# Patient Record
Sex: FEMALE | Race: WHITE | Employment: UNEMPLOYED | URBAN - METROPOLITAN AREA
[De-identification: names, ages, dates, MRNs, and addresses within clinical notes are randomized per-mention and may not be internally consistent; named-entity substitution may affect disease eponyms.]

---

## 2017-01-13 ENCOUNTER — APPOINTMENT (OUTPATIENT)
Dept: GENERAL RADIOLOGY | Age: 42
End: 2017-01-13
Attending: INTERNAL MEDICINE
Payer: MEDICAID

## 2017-01-13 ENCOUNTER — APPOINTMENT (OUTPATIENT)
Dept: CT IMAGING | Age: 42
End: 2017-01-13
Attending: INTERNAL MEDICINE
Payer: MEDICAID

## 2017-01-13 ENCOUNTER — HOSPITAL ENCOUNTER (EMERGENCY)
Age: 42
Discharge: HOME | End: 2017-01-13
Attending: INTERNAL MEDICINE
Payer: MEDICAID

## 2017-01-13 VITALS
DIASTOLIC BLOOD PRESSURE: 63 MMHG | HEIGHT: 64 IN | BODY MASS INDEX: 35 KG/M2 | WEIGHT: 205 LBS | RESPIRATION RATE: 16 BRPM | SYSTOLIC BLOOD PRESSURE: 108 MMHG | OXYGEN SATURATION: 100 % | TEMPERATURE: 97.7 F | HEART RATE: 73 BPM

## 2017-01-13 DIAGNOSIS — E83.42 HYPOMAGNESEMIA: ICD-10-CM

## 2017-01-13 DIAGNOSIS — E86.0 DEHYDRATION: ICD-10-CM

## 2017-01-13 DIAGNOSIS — R55 SYNCOPE AND COLLAPSE: Primary | ICD-10-CM

## 2017-01-13 DIAGNOSIS — E87.6 HYPOKALEMIA: ICD-10-CM

## 2017-01-13 LAB
ALBUMIN SERPL BCP-MCNC: 3.6 G/DL (ref 3.4–5)
ALBUMIN/GLOB SERPL: 1.2 {RATIO} (ref 0.8–1.7)
ALP SERPL-CCNC: 75 U/L (ref 45–117)
ALT SERPL-CCNC: 28 U/L (ref 13–56)
AMPHET UR QL SCN: NEGATIVE
ANION GAP BLD CALC-SCNC: 8 MMOL/L (ref 3–18)
APPEARANCE UR: CLEAR
AST SERPL W P-5'-P-CCNC: 16 U/L (ref 15–37)
BARBITURATES UR QL SCN: NEGATIVE
BASOPHILS # BLD AUTO: 0 K/UL (ref 0–0.06)
BASOPHILS # BLD: 0 % (ref 0–2)
BENZODIAZ UR QL: NEGATIVE
BILIRUB SERPL-MCNC: 0.3 MG/DL (ref 0.2–1)
BILIRUB UR QL: NEGATIVE
BUN SERPL-MCNC: 10 MG/DL (ref 7–18)
BUN/CREAT SERPL: 10 (ref 12–20)
CALCIUM SERPL-MCNC: 8.4 MG/DL (ref 8.5–10.1)
CANNABINOIDS UR QL SCN: NEGATIVE
CHLORIDE SERPL-SCNC: 105 MMOL/L (ref 100–108)
CK MB CFR SERPL CALC: 1 % (ref 0–4)
CK MB SERPL-MCNC: 0.6 NG/ML (ref 0.5–3.6)
CK SERPL-CCNC: 58 U/L (ref 26–192)
CO2 SERPL-SCNC: 29 MMOL/L (ref 21–32)
COCAINE UR QL SCN: NEGATIVE
COLOR UR: YELLOW
CREAT SERPL-MCNC: 0.97 MG/DL (ref 0.6–1.3)
DIFFERENTIAL METHOD BLD: NORMAL
EOSINOPHIL # BLD: 0.2 K/UL (ref 0–0.4)
EOSINOPHIL NFR BLD: 2 % (ref 0–5)
ERYTHROCYTE [DISTWIDTH] IN BLOOD BY AUTOMATED COUNT: 14.4 % (ref 11.6–14.5)
GLOBULIN SER CALC-MCNC: 3 G/DL (ref 2–4)
GLUCOSE SERPL-MCNC: 97 MG/DL (ref 74–99)
GLUCOSE UR STRIP.AUTO-MCNC: NEGATIVE MG/DL
HCG UR QL: NEGATIVE
HCT VFR BLD AUTO: 36.7 % (ref 35–45)
HDSCOM,HDSCOM: NORMAL
HGB BLD-MCNC: 12.3 G/DL (ref 12–16)
HGB UR QL STRIP: NEGATIVE
KETONES UR QL STRIP.AUTO: NEGATIVE MG/DL
LEUKOCYTE ESTERASE UR QL STRIP.AUTO: NEGATIVE
LYMPHOCYTES # BLD AUTO: 32 % (ref 21–52)
LYMPHOCYTES # BLD: 3.4 K/UL (ref 0.9–3.6)
MAGNESIUM SERPL-MCNC: 1.7 MG/DL (ref 1.8–2.4)
MCH RBC QN AUTO: 27.6 PG (ref 24–34)
MCHC RBC AUTO-ENTMCNC: 33.5 G/DL (ref 31–37)
MCV RBC AUTO: 82.3 FL (ref 74–97)
METHADONE UR QL: NEGATIVE
MONOCYTES # BLD: 1.1 K/UL (ref 0.05–1.2)
MONOCYTES NFR BLD AUTO: 10 % (ref 3–10)
NEUTS SEG # BLD: 6 K/UL (ref 1.8–8)
NEUTS SEG NFR BLD AUTO: 56 % (ref 40–73)
NITRITE UR QL STRIP.AUTO: NEGATIVE
OPIATES UR QL: NEGATIVE
PCP UR QL: NEGATIVE
PH UR STRIP: 5 [PH] (ref 5–8)
PLATELET # BLD AUTO: 339 K/UL (ref 135–420)
PMV BLD AUTO: 9.9 FL (ref 9.2–11.8)
POTASSIUM SERPL-SCNC: 3.2 MMOL/L (ref 3.5–5.5)
PROT SERPL-MCNC: 6.6 G/DL (ref 6.4–8.2)
PROT UR STRIP-MCNC: NEGATIVE MG/DL
RBC # BLD AUTO: 4.46 M/UL (ref 4.2–5.3)
SODIUM SERPL-SCNC: 142 MMOL/L (ref 136–145)
SP GR UR REFRACTOMETRY: 1.01 (ref 1–1.03)
TROPONIN I SERPL-MCNC: <0.02 NG/ML (ref 0–0.06)
UROBILINOGEN UR QL STRIP.AUTO: 0.2 EU/DL (ref 0.2–1)
WBC # BLD AUTO: 10.8 K/UL (ref 4.6–13.2)

## 2017-01-13 PROCEDURE — 71010 XR CHEST PORT: CPT

## 2017-01-13 PROCEDURE — 81003 URINALYSIS AUTO W/O SCOPE: CPT | Performed by: INTERNAL MEDICINE

## 2017-01-13 PROCEDURE — 83735 ASSAY OF MAGNESIUM: CPT | Performed by: INTERNAL MEDICINE

## 2017-01-13 PROCEDURE — 96361 HYDRATE IV INFUSION ADD-ON: CPT

## 2017-01-13 PROCEDURE — 80053 COMPREHEN METABOLIC PANEL: CPT | Performed by: INTERNAL MEDICINE

## 2017-01-13 PROCEDURE — 93005 ELECTROCARDIOGRAM TRACING: CPT

## 2017-01-13 PROCEDURE — 70450 CT HEAD/BRAIN W/O DYE: CPT

## 2017-01-13 PROCEDURE — 74011250637 HC RX REV CODE- 250/637: Performed by: INTERNAL MEDICINE

## 2017-01-13 PROCEDURE — 85025 COMPLETE CBC W/AUTO DIFF WBC: CPT | Performed by: INTERNAL MEDICINE

## 2017-01-13 PROCEDURE — 80307 DRUG TEST PRSMV CHEM ANLYZR: CPT | Performed by: INTERNAL MEDICINE

## 2017-01-13 PROCEDURE — 99284 EMERGENCY DEPT VISIT MOD MDM: CPT

## 2017-01-13 PROCEDURE — 81025 URINE PREGNANCY TEST: CPT | Performed by: INTERNAL MEDICINE

## 2017-01-13 PROCEDURE — 74011250636 HC RX REV CODE- 250/636: Performed by: INTERNAL MEDICINE

## 2017-01-13 PROCEDURE — 96360 HYDRATION IV INFUSION INIT: CPT

## 2017-01-13 PROCEDURE — 82550 ASSAY OF CK (CPK): CPT | Performed by: INTERNAL MEDICINE

## 2017-01-13 RX ORDER — POTASSIUM CHLORIDE 20 MEQ/1
20 TABLET, EXTENDED RELEASE ORAL 3 TIMES DAILY
Qty: 9 TAB | Refills: 0 | Status: SHIPPED | OUTPATIENT
Start: 2017-01-13 | End: 2017-01-16

## 2017-01-13 RX ORDER — LANOLIN ALCOHOL/MO/W.PET/CERES
400 CREAM (GRAM) TOPICAL
Status: COMPLETED | OUTPATIENT
Start: 2017-01-13 | End: 2017-01-13

## 2017-01-13 RX ORDER — LANOLIN ALCOHOL/MO/W.PET/CERES
400 CREAM (GRAM) TOPICAL DAILY
Qty: 30 TAB | Refills: 0 | Status: SHIPPED | OUTPATIENT
Start: 2017-01-13 | End: 2017-02-12

## 2017-01-13 RX ORDER — POTASSIUM CHLORIDE 1.5 G/1.77G
40 POWDER, FOR SOLUTION ORAL
Status: COMPLETED | OUTPATIENT
Start: 2017-01-13 | End: 2017-01-13

## 2017-01-13 RX ADMIN — Medication 400 MG: at 12:50

## 2017-01-13 RX ADMIN — SODIUM CHLORIDE 1000 ML: 900 INJECTION, SOLUTION INTRAVENOUS at 11:25

## 2017-01-13 RX ADMIN — POTASSIUM CHLORIDE 40 MEQ: 1.5 POWDER, FOR SOLUTION ORAL at 12:50

## 2017-01-13 NOTE — ED NOTES
Bedside change of shift report received from [address]. Included SBA and MAR    Pt hourly rounding competed. Safety   Pt (x) resting on stretcher with side rails up and call bell in reach. () in chair    () in parents arms. Toileting   Pt offered ()Bedpan     ()Assistance to Restroom     ()Urinal  Ongoing Updates  Updated on plan of care and status of test results.   Pain Management  Inquired as to comfort and offered comfort measures:    () warm blankets   () dimmed lights

## 2017-01-13 NOTE — ED NOTES
Pt hourly rounding competed. Safety   Pt (x) resting on stretcher with side rails up and call bell in reach. () in chair    () in parents arms. Toileting   Pt offered ()Bedpan     ()Assistance to Restroom     ()Urinal  Ongoing Updates  Updated on plan of care and status of test results.   Pain Management  Inquired as to comfort and offered comfort measures:    () warm blankets   () dimmed lights\

## 2017-01-13 NOTE — ED NOTES
Patient ambulated under own power with no difficulties or complaints. Strong steady gait noted. Provider aware.

## 2017-01-13 NOTE — DISCHARGE INSTRUCTIONS
Hypokalemia: Care Instructions  Your Care Instructions  Hypokalemia (say \"da-xb-mnd-JOANIE-rodrigue-uh\") is a low level of potassium. The heart, muscles, kidneys, and nervous system all need potassium to work well. This problem has many different causes. Kidney problems, diet, and medicines like diuretics and laxatives can cause it. So can vomiting or diarrhea. In some cases, cancer is the cause. Your doctor may do tests to find the cause of your low potassium levels. You may need medicines to bring your potassium levels back to normal. You may also need regular blood tests to check your potassium. If you have very low potassium, you may need intravenous (IV) medicines. You also may need tests to check the electrical activity of your heart. Heart problems caused by low potassium levels can be very serious. Follow-up care is a key part of your treatment and safety. Be sure to make and go to all appointments, and call your doctor if you are having problems. It's also a good idea to know your test results and keep a list of the medicines you take. How can you care for yourself at home? · If your doctor recommends it, eat foods that have a lot of potassium. These include fresh fruits, juices, and vegetables. They also include nuts, beans, and milk. · Be safe with medicines. If your doctor prescribes medicines or potassium supplements, take them exactly as directed. Call your doctor if you have any problems with your medicines. · Get your potassium levels tested as often as your doctor tells you. When should you call for help? Call 911 anytime you think you may need emergency care. For example, call if:  · You feel like your heart is missing beats. Heart problems caused by low potassium can cause death. · You passed out (lost consciousness). · You have a seizure. Call your doctor now or seek immediate medical care if:  · You feel weak or unusually tired. · You have severe arm or leg cramps.   · You have tingling or numbness. · You feel sick to your stomach, or you vomit. · You have belly cramps. · You feel bloated or constipated. · You have to urinate a lot. · You feel very thirsty most of the time. · You are dizzy or lightheaded, or you feel like you may faint. · You feel depressed, or you lose touch with reality. Watch closely for changes in your health, and be sure to contact your doctor if:  · You do not get better as expected. Where can you learn more? Go to http://lashay-marty.info/. Enter G358 in the search box to learn more about \"Hypokalemia: Care Instructions. \"  Current as of: July 28, 2016  Content Version: 11.1  © 3447-4129 [employer]. Care instructions adapted under license by [employer] (which disclaims liability or warranty for this information). If you have questions about a medical condition or this instruction, always ask your healthcare professional. [address] any warranty or liability for your use of this information. Fainting: After Your Visit to the Emergency Room  Your Care Instructions  You were seen in the emergency room because you fainted. This means you passed out, or lost consciousness. The treatment you need depends on the reason why you fainted. In many cases fainting is not serious. The doctor may have ordered tests to rule out more serious causes of fainting. Even though you have been released from the emergency room, you still need to watch for any problems. The doctor carefully checked you. But sometimes problems can develop later. If you have new symptoms, or if your symptoms do not get better, return to the emergency room or call your doctor right away. A visit to the emergency room is only one step in your treatment. Even if you feel better, you still need to do what your doctor recommends, such as going to all suggested follow-up appointments and taking medicines exactly as directed.  This will help you recover and help prevent future problems. How can you care for yourself at home? · Drink plenty of fluids to prevent dehydration. If you have kidney, heart, or liver disease and have to limit fluids, talk with your doctor before you increase your fluid intake. When should you call for help? Call 911 if:  · You have chest pain or pressure. This may occur with:  ¨ Sweating. ¨ Shortness of breath. ¨ Nausea or vomiting. ¨ Pain that spreads from the chest to the neck, jaw, or one or both shoulders or arms. ¨ Dizziness or lightheadedness. ¨ A fast or uneven pulse. After calling 911, chew 1 adult-strength aspirin. Wait for an ambulance. Do not try to drive yourself. · You have signs of a stroke. These may include:  ¨ Sudden numbness, paralysis, or weakness in your face, arm, or leg, especially on only one side of your body. ¨ New problems with walking or balance. ¨ Sudden vision changes. ¨ Drooling or slurred speech. ¨ New problems speaking or understanding simple statements, or feeling confused. ¨ A sudden, severe headache that is different from past headaches. Return to the emergency room now if:  · You passed out (lost consciousness) again. · You have a seizure. Where can you learn more? Go to SlideShare.be  Enter J227 in the search box to learn more about \"Fainting: After Your Visit to the Emergency Room. \"   © 7841-8816 [employer]. Care instructions adapted under license by Romayne Duster (which disclaims liability or warranty for this information). This care instruction is for use with your licensed healthcare professional. If you have questions about a medical condition or this instruction, always ask your healthcare professional. [place] [address] any warranty or liability for your use of this information. Content Version: 9.4.17513;  Last Revised: September 21, 2010               Dehydration: Care Instructions  Your Care Instructions  Dehydration happens when your body loses too much fluid. This might happen when you do not drink enough water or you lose large amounts of fluids from your body because of diarrhea, vomiting, or sweating. Severe dehydration can be life-threatening. Water and minerals called electrolytes help put your body fluids back in balance. Learn the early signs of fluid loss, and drink more fluids to prevent dehydration. Follow-up care is a key part of your treatment and safety. Be sure to make and go to all appointments, and call your doctor if you are having problems. It's also a good idea to know your test results and keep a list of the medicines you take. How can you care for yourself at home? · To prevent dehydration, drink plenty of fluids, enough so that your urine is light yellow or clear like water. Choose water and other caffeine-free clear liquids until you feel better. If you have kidney, heart, or liver disease and have to limit fluids, talk with your doctor before you increase the amount of fluids you drink. · If you do not feel like eating or drinking, try taking small sips of water, sports drinks, or other rehydration drinks. · Get plenty of rest.  To prevent dehydration  · Add more fluids to your diet and daily routine, unless your doctor has told you not to. · During hot weather, drink more fluids. Drink even more fluids if you exercise a lot. Stay away from drinks with alcohol or caffeine. · Watch for the symptoms of dehydration. These include:  ¨ A dry, sticky mouth. ¨ Dark yellow urine, and not much of it. ¨ Dry and sunken eyes. ¨ Feeling very tired. · Learn what problems can lead to dehydration. These include:  ¨ Diarrhea, fever, and vomiting. ¨ Any illness with a fever, such as pneumonia or the flu. ¨ Activities that cause heavy sweating, such as endurance races and heavy outdoor work in hot or humid weather.   ¨ Alcohol or drug abuse or withdrawal.  ¨ Certain medicines, such as cold and allergy pills (antihistamines), diet pills (diuretics), and laxatives. ¨ Certain diseases, such as diabetes, cancer, and heart or kidney disease. When should you call for help? Call 911 anytime you think you may need emergency care. For example, call if:  · You passed out (lost consciousness). Call your doctor now or seek immediate medical care if:  · You are confused and cannot think clearly. · You are dizzy or lightheaded, or you feel like you may faint. · You have signs of needing more fluids. You have sunken eyes and a dry mouth, and you pass only a little dark urine. · You cannot keep fluids down. Watch closely for changes in your health, and be sure to contact your doctor if:  · You are not making tears. · Your skin is very dry and sags slowly back into place after you pinch it. · Your mouth and eyes are very dry. Where can you learn more? Go to http://lashay-marty.info/. Enter J470 in the search box to learn more about \"Dehydration: Care Instructions. \"  Current as of: May 27, 2016  Content Version: 11.1  © 0225-4898 [employer]. Care instructions adapted under license by [employer] (which disclaims liability or warranty for this information). If you have questions about a medical condition or this instruction, always ask your healthcare professional. [address] any warranty or liability for your use of this information.

## 2017-01-13 NOTE — ED PROVIDER NOTES
HPI Comments: 9:38 AM   Kandice Bermudez is a 39 y.o. female with a h/o prescription drug addiction, bipolar disorder, colitis, and HTN presenting via EMS to the ED C/O syncopal episode this AM. Pt was being seen at [facility] office for complaints of sinus sx when episode occurred. Pt reports she felt nauseous afterwards. Denies head trauma/injury from episode. Pt has not had any diarrhea today. States she is constipated constantly. Her last BM was ~2 days ago and she denies any hematochezia or melena present at that time. Pt was taking Flagil and Cipro and has finished both 1 week ago. Pt also reports she has chest tightness, generalized weakness, chronic low back pain, chills, and a decreased appetite. Denies hx of DM, fevers, cough, and any other sx or complaints. The history is provided by the patient. No  was used. Past Medical History:   Diagnosis Date    Arrhythmia     Diabetes ([facility] [address]     Gastrointestinal disorder      colitis    Heart murmur     Hypertension     Hypothyroidism     Other ill-defined conditions(799.89)      prescription drug addiction    Psychiatric disorder      depression, bipolar anxiety, ocd    Tattoo      Tattoos       Past Surgical History:   Procedure Laterality Date    Hx orthopaedic       back surgery, ulnar nerve         History reviewed. No pertinent family history. Social History     Social History    Marital status:      Spouse name: N/A    Number of children: N/A    Years of education: N/A     Occupational History    Not on file.      Social History Main Topics    Smoking status: Current Every Day Smoker     Packs/day: 1.50    Smokeless tobacco: Not on file      Comment: pt stated she quit 2 weeks ago    Alcohol use Yes      Comment: occasionally    Drug use: No    Sexual activity: Not on file     Other Topics Concern    Not on file     Social History Narrative         ALLERGIES: Sulfatrim ds; Sulfa (sulfonamide antibiotics); and Penicillins    Review of Systems   Constitutional: Positive for appetite change (decreased) and chills. Negative for fever. Respiratory: Positive for chest tightness. Negative for cough. Gastrointestinal: Positive for constipation (chronic) and nausea. Negative for blood in stool, diarrhea and vomiting. Musculoskeletal: Positive for back pain (chronic low). Neurological: Positive for syncope and weakness (generalized). All other systems reviewed and are negative. Vitals:    01/13/17 0930 01/13/17 0934 01/13/17 1200   BP: 111/65 111/65 103/63   Pulse:  70 73   Resp:  18 15   Temp:  97.7 °F (36.5 °C)    SpO2: 100%  100%   Weight:  93 kg (205 lb)    Height:  5' 4\" (1.626 m)             Physical Exam   Constitutional: She is oriented to person, place, and time. She appears well-developed and well-nourished. HENT:   Head: Normocephalic and atraumatic. Right Ear: External ear normal.   Left Ear: External ear normal. Tympanic membrane is erythematous (slight). Nose: Nose normal.   Mouth/Throat: Oropharynx is clear and moist. Mucous membranes are dry. No effusion to left TM. Eyes: Conjunctivae and EOM are normal. Right eye exhibits no discharge. Left eye exhibits no discharge. No scleral icterus. Pupils are 3 mm and equal but slightly sluggish   Neck: Normal range of motion. Neck supple. No JVD present. No tracheal deviation present. Cardiovascular: Normal rate, regular rhythm, normal heart sounds and intact distal pulses. Pulmonary/Chest: Effort normal and breath sounds normal.   Abdominal: Soft. Bowel sounds are normal. She exhibits no distension and no mass. There is no tenderness. No HSM   Musculoskeletal: Normal range of motion. She exhibits no edema. No step offs   Neurological: She is alert and oriented to person, place, and time. She has normal reflexes. No focal motor weakness. Skin: Skin is warm and dry. No rash noted.    healed surgucal scar on lumbar Psychiatric: She has a normal mood and affect. Her behavior is normal.   Nursing note and vitals reviewed. RESULTS:    EKG interpretation: (Preliminary)  NSR at 67 bpm. Normal ECG. Negative STEMI. EKG read by Paty Gillette MD at 9:39 AM/. XR CHEST PORT   Final Result    Low lung volumes, no active disease or significant interval change. As read by the radiologist.      CT HEAD WO CONT   Final Result    No acute intracranial abnormalities.     As read by the radiologist.           Labs Reviewed   METABOLIC PANEL, COMPREHENSIVE - Abnormal; Notable for the following:        Result Value    Potassium 3.2 (*)     BUN/Creatinine ratio 10 (*)     Calcium 8.4 (*)     All other components within normal limits   MAGNESIUM - Abnormal; Notable for the following:     Magnesium 1.7 (*)     All other components within normal limits   CBC WITH AUTOMATED DIFF   CARDIAC PANEL,(CK, CKMB & TROPONIN)   DRUG SCREEN, URINE   URINALYSIS W/ RFLX MICROSCOPIC   HCG URINE, QL       Recent Results (from the past 12 hour(s))   EKG, 12 LEAD, INITIAL    Collection Time: 01/13/17  9:39 AM   Result Value Ref Range    Ventricular Rate 67 BPM    Atrial Rate 67 BPM    P-R Interval 190 ms    QRS Duration 92 ms    Q-T Interval 392 ms    QTC Calculation (Bezet) 414 ms    Calculated P Axis 64 degrees    Calculated R Axis 38 degrees    Calculated T Axis 46 degrees    Diagnosis       Normal sinus rhythm  Normal ECG  When compared with ECG of 28-FEB-2014 14:41,  No significant change was found     CBC WITH AUTOMATED DIFF    Collection Time: 01/13/17 10:35 AM   Result Value Ref Range    WBC 10.8 4.6 - 13.2 K/uL    RBC 4.46 4.20 - 5.30 M/uL    HGB 12.3 12.0 - 16.0 g/dL    HCT 36.7 35.0 - 45.0 %    MCV 82.3 74.0 - 97.0 FL    MCH 27.6 24.0 - 34.0 PG    MCHC 33.5 31.0 - 37.0 g/dL    RDW 14.4 11.6 - 14.5 %    PLATELET 838 217 - 251 K/uL    MPV 9.9 9.2 - 11.8 FL    NEUTROPHILS 56 40 - 73 %    LYMPHOCYTES 32 21 - 52 %    MONOCYTES 10 3 - 10 % EOSINOPHILS 2 0 - 5 %    BASOPHILS 0 0 - 2 %    ABS. NEUTROPHILS 6.0 1.8 - 8.0 K/UL    ABS. LYMPHOCYTES 3.4 0.9 - 3.6 K/UL    ABS. MONOCYTES 1.1 0.05 - 1.2 K/UL    ABS. EOSINOPHILS 0.2 0.0 - 0.4 K/UL    ABS. BASOPHILS 0.0 0.0 - 0.06 K/UL    DF AUTOMATED     METABOLIC PANEL, COMPREHENSIVE    Collection Time: 01/13/17 10:35 AM   Result Value Ref Range    Sodium 142 136 - 145 mmol/L    Potassium 3.2 (L) 3.5 - 5.5 mmol/L    Chloride 105 100 - 108 mmol/L    CO2 29 21 - 32 mmol/L    Anion gap 8 3.0 - 18 mmol/L    Glucose 97 74 - 99 mg/dL    BUN 10 7.0 - 18 MG/DL    Creatinine 0.97 0.6 - 1.3 MG/DL    BUN/Creatinine ratio 10 (L) 12 - 20      GFR est AA >60 >60 ml/min/1.73m2    GFR est non-AA >60 >60 ml/min/1.73m2    Calcium 8.4 (L) 8.5 - 10.1 MG/DL    Bilirubin, total 0.3 0.2 - 1.0 MG/DL    ALT 28 13 - 56 U/L    AST 16 15 - 37 U/L    Alk. phosphatase 75 45 - 117 U/L    Protein, total 6.6 6.4 - 8.2 g/dL    Albumin 3.6 3.4 - 5.0 g/dL    Globulin 3.0 2.0 - 4.0 g/dL    A-G Ratio 1.2 0.8 - 1.7     CARDIAC PANEL,(CK, CKMB & TROPONIN)    Collection Time: 01/13/17 10:35 AM   Result Value Ref Range    CK 58 26 - 192 U/L    CK - MB 0.6 0.5 - 3.6 ng/ml    CK-MB Index 1.0 0.0 - 4.0 %    Troponin-I, Qt. <0.02 0.00 - 0.06 NG/ML   MAGNESIUM    Collection Time: 01/13/17 10:35 AM   Result Value Ref Range    Magnesium 1.7 (L) 1.8 - 2.4 mg/dL   DRUG SCREEN, URINE    Collection Time: 01/13/17 10:45 AM   Result Value Ref Range    BENZODIAZEPINE NEGATIVE  NEG      BARBITURATES NEGATIVE  NEG      THC (TH-CANNABINOL) NEGATIVE  NEG      OPIATES NEGATIVE  NEG      PCP(PHENCYCLIDINE) NEGATIVE  NEG      COCAINE NEGATIVE  NEG      AMPHETAMINE NEGATIVE  NEG      METHADONE NEGATIVE  NEG      HDSCOM (NOTE)        MDM  Number of Diagnoses or Management Options  Diagnosis management comments: Ddx: ACS, CVA, CNS bleed, arrhythmia, CHF, MI, PE, pneumonia, pneumothorax, anemia, uremia.  Rule out: other cardiovascular, pulmonary, electrolyte disorder, drug or substance toxicity. Amount and/or Complexity of Data Reviewed  Clinical lab tests: reviewed and ordered  Tests in the radiology section of CPT®: reviewed and ordered (CXR, CT Head)  Tests in the medicine section of CPT®: reviewed and ordered (EKG)  Discuss the patient with other providers: yes Estuardo Gillespie DO (HENLORELEI))  Independent visualization of images, tracings, or specimens: yes (EKG, CXR)      ED Course     Medications   sodium chloride 0.9 % bolus infusion 1,000 mL (1,000 mL IntraVENous New Bag 1/13/17 1125)   potassium chloride (KLOR-CON) packet 40 mEq (40 mEq Oral Given 1/13/17 1250)   magnesium oxide (MAG-OX) tablet 400 mg (400 mg Oral Given 1/13/17 1250)       Procedures    PROGRESS NOTE:   9:28 AM  Initial assessment completed. Written by Emelyn Dewey, ED Scribe, as dictated by Warren Marquez MD.     CONSULT NOTE:   12:11 PM  Warren Marquez MD spoke with Estuardo MondayDO  (ENT who saw the pt)  Specialty: HENT  Discussed pt's hx, disposition, and available diagnostic and imaging results over the telephone. Reviewed care plans. Consulting physician agrees with plans as outlined. Pt was sitting in chair when it occurred and was passed out for 60 seconds. She was ashen and had a thready pulse. BP was high. Pt had not been given any medication yet or instrumentation. Written by Emelyn Dewey ED Scribe, as dictated by Warren Marquez MD .    PROGRESS NOTE:   12:37 PM  Pt has been re-examined by Warren Marquez MD. Pt feeling asymptomatic right now. Has nl mentation no focal neuro deficits, heart rrr no mrg, lungs cta breanna. We discussed her labs results. Written by Emelyn Dewey ED Scribe, as dictated by Warren Marquez MD.    DISCHARGE NOTE:  12:38 PM  Bernie Oridella Gavin's  results have been reviewed with her. She has been counseled regarding her diagnosis, treatment, and plan.   She verbally conveys understanding and agreement of the signs, symptoms, diagnosis, treatment and prognosis and additionally agrees to follow up as discussed. She also agrees with the care-plan and conveys that all of her questions have been answered. I have also provided discharge instructions for her that include: educational information regarding their diagnosis and treatment, and list of reasons why they would want to return to the ED prior to their follow-up appointment, should her condition change. The patient and/or family have been provided with education for proper Emergency Department utilization. CLINICAL IMPRESSION:    1. Syncope and collapse    2. Hypokalemia    3. Hypomagnesemia    4. Dehydration        AFTER VISIT PLAN:    Current Discharge Medication List      START taking these medications    Details   magnesium oxide (MAG-OX) 400 mg tablet Take 1 Tab by mouth daily for 30 days. Qty: 30 Tab, Refills: 0      potassium chloride (K-DUR, KLOR-CON) 20 mEq tablet Take 1 Tab by mouth three (3) times daily for 3 days. Qty: 9 Tab, Refills: 0         CONTINUE these medications which have NOT CHANGED    Details   amitriptyline (ELAVIL) 75 mg tablet Take  by mouth nightly. BACLOFEN PO Take  by mouth. TIZANIDINE HCL (ZANAFLEX PO) Take  by mouth.      clonazePAM (KLONOPIN) 0.5 mg tablet Take 0.5 mg by mouth two (2) times a day. methocarbamol (ROBAXIN-750) 750 mg tablet Take 750 mg by mouth four (4) times daily. traMADol (ULTRAM) 50 mg tablet Take 50 mg by mouth every six (6) hours as needed for Pain. predniSONE (STERAPRED DS) 10 mg dose pack Take 6 tabs by mouth daily for 4 days, then 4 tabs daily next 4 days, then 2 tabs daily last 4 days  Qty: 48 Tab, Refills: 0      oxyCODONE-acetaminophen (PERCOCET) 5-325 mg per tablet Take 1 Tab by mouth every four (4) hours as needed for Pain. Max Daily Amount: 6 Tabs. Qty: 20 Tab, Refills: 0      CHOLECALCIFEROL, VITAMIN D3, (VITAMIN D3 PO) Take  by mouth. MELOXICAM (MOBIC PO) Take  by mouth.       PROPRANOLOL HCL (PROPRANOLOL PO) Take  by mouth.              Follow-up Information     Follow up With Details Comments Contact Henri Real MD Schedule an appointment as soon as possible for a visit in 2 days Follow up with your PCP.  [address] [place] [address]      [facility] EMERGENCY DEPT  As needed, If symptoms worsen [address]  [address] [facility] [address] [phone]           Attestations: This note is prepared by Miya Santos, acting as Scribe for Frank Barcenas MD.    Frank Barcenas MD: The scribe's documentation has been prepared under my direction and personally reviewed by me in its entirety. I confirm that the note above accurately reflects all work, treatment, procedures, and medical decision making performed by me.

## 2017-01-13 NOTE — ED TRIAGE NOTES
Pt was at her dr office to follow about a previous MRI and felt weak and dizzy,  Assisted to floor, ems brought her here for a more comprehensive workup  Sepsis Screening completed    (  )Patient meets SIRS criteria. ( x )Patient does not meet SIRS criteria.       SIRS Criteria is achieved when two or more of the following are present   Temperature < 96.8°F (36°C) or > 100.9°F (38.3°C)   Heart Rate > 90 beats per minute   Respiratory Rate > 20 beats per minute   WBC count > 12,000 or <4,000 or > 10% bands

## 2017-01-15 LAB
ATRIAL RATE: 67 BPM
CALCULATED P AXIS, ECG09: 64 DEGREES
CALCULATED R AXIS, ECG10: 38 DEGREES
CALCULATED T AXIS, ECG11: 46 DEGREES
DIAGNOSIS, 93000: NORMAL
P-R INTERVAL, ECG05: 190 MS
Q-T INTERVAL, ECG07: 392 MS
QRS DURATION, ECG06: 92 MS
QTC CALCULATION (BEZET), ECG08: 414 MS
VENTRICULAR RATE, ECG03: 67 BPM

## 2017-06-07 ENCOUNTER — HOSPITAL ENCOUNTER (OUTPATIENT)
Dept: PHYSICAL THERAPY | Age: 42
Discharge: HOME | End: 2017-06-07
Payer: MEDICAID

## 2017-06-07 PROCEDURE — 97163 PT EVAL HIGH COMPLEX 45 MIN: CPT

## 2017-06-07 PROCEDURE — 97110 THERAPEUTIC EXERCISES: CPT

## 2017-06-07 PROCEDURE — 97530 THERAPEUTIC ACTIVITIES: CPT

## 2017-06-07 NOTE — PROGRESS NOTES
PT DAILY TREATMENT NOTE/LUMBAR EVAL 3-16PT DAILY TREATMENT NOTE/CERVICAL EVAL3-16  Patient Name: Maureen Staton  Date:2017  : 1975  [x]  Patient  Verified  Payor: Zack Bounds / Plan: [facility] [employer] [employer] [facility] / Product Type: Managed Care Medicaid /    In time:920  Out time:1025  Total Treatment Time (min): 65  Total Timed Codes (min): 55  1:1 Treatment Time ( only): n/a   Visit #: 1 of 12    Treatment Area: Radiculopathy, cervical region [M54.12]  Radiculopathy, cervicothoracic region [M54.13]  SUBJECTIVE  Pain Level (0-10 scale): LB 3, CS 6  -constant     Any medication changes, allergies to medications, adverse drug reactions, diagnosis change, or new procedure performed?: - No    - Yes (see summary sheet for update)  Subjective functional status/changes: patient reports chronic neck and back pain for > 5 years. \"I feel like it is choking me. Nerves in my neck are squeezing me. Worst part in in my low neck\"  CS pain is constant, increased when lying down, difficulty looking up/down, occasional sharp shooting pain in sides of neck. LB also constant but less severe. \" It is not nearly as bad as my neck. My legs go completely numb at night time when sleeping\" . Sleeping in [place]. Numbness gradually eases once patient ambulating. Not working- on disability due to mental issues since age 13. Was placed in residential home at age 15 due to nervous breakdown, bipolar, depression. Lives with her daughter. 24 YO also on disability. Recent US of heart- results pending  Wakes up with heart pounding. Daily activities: intermittent housecleaning, has a stationary bike, feeling week. Sits or lies down most of the day-    PLOF: chronic difficulty due to medical history  Limitations to PLOF: difficulty exercising due to anxiety and rapid heart beat. Pain under left breast. Physically very inactive.   Mechanism of Injury: recent fall onto right hip several months ago, it feels like it is off  Current symptoms/Complaints: mild LB, severe neck pain  Previous Treatment/Compliance: PT in 2016- poor compliance, patient has good intentions to attend therapy on a regular basis  PMHx/Surgical Hx: s/p 2 laminectomies in LS about 20 years ago  Work Hx: no work, on disability  Living Situation: lives with her daughter in a 2 BR apartment  Pt Goals: \"I want to strengthen my body and mobility\"  Barriers: []pain []financial []time []transportation [x]other: motivation/compliance  Motivation: good at present, hx of poor motivation/perseverance  Substance use: []Alcohol []Tobacco []other:   FABQ Score: []low []elevate  Cognition: A & O x 3    Other:    OBJECTIVE/EXAMINATION  Domestic Life: WNL  Activity/Recreational Limitations: very low activity level  Mobility: WFL  Self Care:  WNL    SUBJECTIVE  Pain Level (0-10 scale): 6 CS, 3 LS  [x]constant []intermittent []improving []worsening []no change since onset            Modality rationale: Pain control   Min Type Additional Details    [] Estim:  []Unatt       []IFC  []Premod                        []Other:  []w/ice   []w/heat  Position:  Location:    [] Estim: []Att    []TENS instruct  []NMES                    []Other:  []w/US   []w/ice   []w/heat  Position:  Location:    []  Traction: [] Cervical       []Lumbar                       [] Prone          []Supine                       []Intermittent   []Continuous Lbs:  [] before manual  [] after manual    []  Ultrasound: []Continuous   [] Pulsed                           []1MHz   []3MHz Location:  W/cm2:    []  Iontophoresis with dexamethasone         Location: [] Take home patch   [] In clinic   10 []  Ice     [x]  heat  []  Ice massage  []  Laser   []  Anodyne Position:supine  Location:CS    []  Laser with stim  []  Other: Position:  Location:    []  Vasopneumatic Device Pressure:       [] lo [] med [] hi   Temperature: [] lo [] med [] hi   [] Skin assessment post-treatment:  []intact []redness- no adverse reaction    []redness  adverse reaction:     20 min [x]Eval                  []Re-Eval       25 min Therapeutic Exercise:  [x] See flow sheet :   Rationale: increase ROM and increase strength to improve the patients ability to perform ADL    10 min Therapeutic Activity:  [x]  See flow sheet :instruction in HeP, POC , discussion about need to attend therapy on regular basis    Rationale: increase ROM, increase strength, improve coordination and increase proprioception  to improve the patients ability to return to more active life style and ADL               With   [x] TE   [x] TA   [] neuro   [] other: Patient Education: [x] Review HEP    [] Progressed/Changed HEP based on:   [] positioning   [] body mechanics   [] transfers   [] heat/ice application    [] other:      Other Objective/Functional Measures: good tolerance to current activities, requesting ES    Physical Therapy Evaluation Cervical Spine     SUBJECTIVE  Chief Complaint:CS > LS pain    Mechanism of injury:chronic onset    Symptoms  Aggravated by:   [x] Bending LS [] Sitting [] Standing [] Reaching Overhead   [x] MovingCS [] Cough [] Sneeze [] Eating   [] AM  [] PM  Lying:  [] sup   [] pro   [] sidelying   [] Other:     Eased by:    [] Bending [] Sitting [] Standing Lying: [] sup  [] pro  [] sidelying   [] Moving [] AM  [] PM  [] Other:     General Health:  Red Flags Indicated? [] Yes    [x] No  [] Yes [] No Recent weight change (If yes, due to dieting?  [] Yes  [] No)   [] Yes [] No Persistent cough  [] Yes [] No Unremitting pain at night  [] Yes [] No Dizziness  [] Yes [] No Blurred vision  [] Yes [] No Hands more cold or painful in cold weather  [] Yes [] No Ringing in ears  [] Yes [] No Difficulty swallowing  [] Yes [] No Dysfunction of bowel or bladder  [] Yes [] No Recent illness within past 3 weeks (i.e, cold, flu)  [] Yes [] No Jaw pain    Past History/Treatments:previous PT, poor attendance    Diagnostic Tests: [] Lab work [x] X-rays    [] CT [x] MRI     [] Other:  Results:    Functional Status  Prior level of function:  Present functional limitations:  What position do you sleep in?:    Headaches: Do you have headaches? [] Yes   [] No  How often do you get headaches? How long does the headache last?  What aggravates it? What relieves it? Does the headache coincide with any other symptoms (visual disturbances, light sensitivity)? Where is the headache? Does it change locations?   Other:    OBJECTIVE  Posture: [] WNL  Head Position:FHP  Shoulder/Scapular Position:right shoulder min elevated  C-Kyphosis:  [] increased   [] decreased   C-Lordosis:   [x] increased   [] decreased  T-Kyphosis:  [x] increased   [] decreased  T-Lordosis:   [] increased   [] decreased     TMJ: [x] N/A [] Abnormal - ROM:   Palpation:    Cervical Retraction: [] WNL    [x] Abnormal:limited mobility, mild Dowager's hump    Shoulder/Scapular Screen: [] WNL    [x] Abnormal:limited shoulder mobility with overhead reaching/flex    Active Movements: [] N/A   [] Too acute   [] Other:  ROM % AROM % PROM Comments:pain, area   Forward flexion 30  P   Extension 30  P   SB right 15  P   SB left  30  P   Rotation right 40     Rotation left 40       Thoracic Spine: [] N/A    [] WNL   [x] Other:limited Ext mobility    PROM:    Palpation:  [] Min  [x] Mod  [] Severe    Location:all neck musculature  [] Min  [] Mod  [] Severe    Location:  [] Min  [] Mod  [] Severe    Location:    Neuro Screen (myotome/dematome/felexes): [x] WNL  Myotome Level Muscle Test Myotome Level Muscle Test   C5 Shoulder Adduction - Deltoid C8 Finger Flexors   C6 Wrist Extension T1 Finger Abduction - Interossei   C7 Elbow Extension     Comments:      Special Tests:  Cervical:        Vertebral Artery:  [] R    [] L    [] +    [] -       Alar Ligament: [] R    [] L    [] +    [] -       Transverse Lig: [] R    [] L    [] +    [] -       Spurling's:  [] R    [] L    [] +    [] -       Distraction:  [] R    [] L    [] + [] -       Compression: [] R    [] L    [] +    [] -    Thoracic Outlet Tests: [x] N/A       Adson's:  [] R    [] L    [] +    [] -       Hyperabduction: [] R    [] L    [] +    [] -       Renan's:  [] R    [] L    [] +    [] -       Dona:  [] R    [] L    [] +    [] -    Diaphragmatic Breathing: [] Normal    [x] Abnormal: shallow  Muscle Flexibility: [] N/A   Scalenes: [] WNL    [x] Tight    [x] R    [x] L   Upper Trap: [] WNL    [x] Tight    [x] R    [x] L   Levator: [] WNL    [x] Tight    [x] R    [x] L   Pect. Minor: [] WNL    [x] Tight    [x] R    [x] L    Global Muscular Weakness: [] N/A   Lower Trap:weak   Rhomboids:   Middle Trap:weak   Serratus Ant:weak   Ext Rotators:weak   Other:    Other tests/comments:       Pain Level (0-10 scale) post treatment: CS 4, LS 3    ASSESSMENT/Changes in Function: good response to TE/TA    Patient will continue to benefit from skilled PT services to address functional mobility deficits, address ROM deficits, address strength deficits, analyze and address soft tissue restrictions and assess and modify postural abnormalities to attain remaining goals.      [x]  See Plan of Care  []  See progress note/recertification  []  See Discharge Summary         Progress towards goals / Updated goals:  Good understanding of initial HEP and POC                Modality rationale: decrease pain and increase tissue extensibility to improve the patients ability to return to PLOF   Min Type Additional Details    [] Estim:  []Unatt       []IFC  []Premod                        []Other:  []w/ice   []w/heat  Position:  Location:    [] Estim: []Att    []TENS instruct  []NMES                    []Other:  []w/US   []w/ice   []w/heat  Position:  Location:    []  Traction: [] Cervical       []Lumbar                       [] Prone          []Supine                       []Intermittent   []Continuous Lbs:  [] before manual  [] after manual    []  Ultrasound: []Continuous   [] Pulsed []1MHz   []3MHz Location:  W/cm2:    []  Iontophoresis with dexamethasone         Location: [] Take home patch   [] In clinic   10 []  Ice     [x]  heat  []  Ice massage  []  Laser   []  Anodyne Position:supine 90/90  Location:CS    []  Laser with stim  []  Other: Position:  Location:    []  Vasopneumatic Device Pressure:       [] lo [] med [] hi   Temperature: [] lo [] med [] hi   [] Skin assessment post-treatment:  []intact []redness- no adverse reaction    []redness  adverse reaction:         Physical Therapy Evaluation - Lumbar Spine ([facility])    SUBJECTIVE  Chief Complaint:CS and L? BP    Mechanism of injury:gradual onset, chronic pain    Symptoms:  Aggravated by:   [x] Bending [] Sitting [] Standing [] Walking   [x] Moving [] Cough [] Sneeze [] Valsalva   [x] AM  [x] PM  Lying:  [] sup   [] pro   [] sidelying   [] Other:     Eased by:    [] Bending [] Sitting [] Standing [] Walking   [] Moving [] AM  [] PM  Lying: [] sup  [] pro  [] sidelying   [x] Other:rest     General Health:  Red Flags Indicated? [] Yes    [x] No  [] Yes [] No Recent weight change (If yes, due to dieting?  [] Yes  [] No)   [] Yes [] No Weakness in legs during walking  [] Yes [] No Unremitting pain at night  [] Yes [] No Abdominal pain or problems  [] Yes [] No Rectal bleeding  [] Yes [] No Feet more cold or painful in cold weather  [] Yes [] No Menstrual irregularities  [] Yes [] No Blood or pain with urination  [] Yes [] No Dysfunction of bowel or bladder  [] Yes [] No Recent illness within past 3 weeks (i.e, cold, flu)  [] Yes [] No Numbness/tingling in buttock/genitalia region    Past History/Treatments: multiple trials of PT but always self d/c    Diagnostic Tests: [] Lab work [] X-rays    [] CT [x] MRI     [] Other:  Results:see MRI report    Functional Status  Prior level of function:  Present functional limitations:  What position do you sleep in?:    OBJECTIVE  Posture:flexed postural alignment  Lateral Shift: [] R    [] L [] +  [] -  Kyphosis: [] Increased [] Decreased   []  WNL  Lordosis:  [] Increased [] Decreased   [] WNL  Pelvic symmetry: [] WNL    [] Other:    Gait:  [] Normal     [x] Abnormal:guarded gait pattern without AD    Active Movements: [] N/A   [] Too acute   [] Other:  ROM % AROM % PROM Comments:pain, area   Forward flexion 40-60 14\"  Stretching, unable to reverse LS curve   Extension 20-30 limited     SB right 20-30 22\"     SB left 20-30 22\"     Rotation right 5-10 limited  stiffness   Rotation left 5-10 limited  stiffness         Neuro Screen [] WNL  Myotome/Dermatome/Reflexes:bilateral arm and hand numbness, paresthesia  Comments:    Dural Mobility:  SLR Sitting: [x] R    [x] L    [] +    [x] -  @ (degrees):           Supine: [x] R    [x] L    [] +    [x] -  @ (degrees):   Slump Test: [] R    [] L    [] +    [] -  @ (degrees):   Prone Knee Bend: [] R    [] L    [] +    [] -         Strength   L(0-5) R (0-5) N/T   Hip Flexion (L1,2) 3- 3- []   Knee Extension (L3,4) 4- 4- []   Ankle Dorsiflexion (L4)   []   Great Toe Extension (L5)   []   Ankle Plantarflexion (S1)   []   Knee Flexion (S1,2)   []   Upper Abdominals 3 3 []   Lower Abdominals 3 3 []   Paraspinals 3 3 []   Back Rotators   []   Gluteus Roberto 3 4 []   Other   []            Hip: Mason Danes:  [] R    [] L    [] +    [] -     Scour:  [] R    [] L    [] +    [] -     Piriformis: [x] R    [x] L    [x] +    [] -          Deficits: Masha's: [x] R    [x] L    [x] +    [] -     Don: [x] R    [x] L    [x] +    [] -     Hamstrings 90/90:70/70  Gastrocsoleus (to neutral): Right: Left:           Allegra Drake, PT 6/7/2017  9:23 AM

## 2017-06-07 NOTE — PROGRESS NOTES
[facility] in [address] [place], [facility] [address] [place] [address]  Phone: [phone]      Fax:  [phone] of Care/ Statement of Necessity for Physical Therapy Services       Patient name: Jaylen Barrera Start of Care: 2017   Referral source: Elkin Stafford MD : 1975    Medical Diagnosis: Radiculopathy, cervical region [M54.12]  Radiculopathy, cervicothoracic region [M54.13]   Onset Date:chronic pain > 5 years, recent exacerbation     Treatment Diagnosis: CS and LBP   Prior Hospitalization: see medical history Provider#: [id number]   Medications: Verified on Patient summary List    Comorbidities: bipolar, depression, disability since age 13 due to mental issues, thyroid dysfunction, fibromyalgia, hypertension   Prior Level of Function: chronic dysfunction, on disability since age 13      The Plan of Care and following information is based on the information from the initial evaluation. Assessment/ key information: 43 YOF with reports of constant severe neck pain/tightness and moderate LBP is reporting being ready to work on her condition and attend therapy on a regular basis ( history of self d/c during previous PT treatments). Patient is presenting to PT with reports of constant pain, limited function and sedentary life style. Objective findings include limited CS/LS ROM, deficit in core strength/neck stability/LS stability, deficit in flexibility and LE strength. Self reported FOTO score of 43 for CS and 33 for LS indicates limitation in function. Patient is a good candidate for skilled PT. Evaluation Complexity History HIGH Complexity :3+ comorbidities / personal factors will impact the outcome/ POC ; Examination MEDIUM Complexity : 3 Standardized tests and measures addressing body structure, function, activity limitation and / or participation in recreation  ;Presentation MEDIUM Complexity : Evolving with changing characteristics  ; Clinical Decision Making MEDIUM Complexity : FOTO score of 26-74  Overall Complexity Rating: MEDIUM  Problem List: pain affecting function, decrease ROM, decrease strength, decrease ADL/ functional abilitiies, decrease activity tolerance and decrease flexibility/ joint mobility   Treatment Plan may include any combination of the following: Therapeutic exercise, Therapeutic activities, Neuromuscular re-education, Physical agent/modality, Manual therapy and Patient education  Patient / Family readiness to learn indicated by: asking questions and trying to perform skills  Persons(s) to be included in education: patient (P)  Barriers to Learning/Limitations: None  Patient Goal (s): I want to strengthen my body and mobility  Patient Self Reported Health Status: poor  Rehabilitation Potential: fair    Short Term Goals: To be accomplished in 4 weeks:   1. Patient is compliant with HEP and attends therapy at least 2x weekly  Status at [facility]: patient has hx of self d/c, motivated this time , discussed need for regular therapy attendance    2. Reduction in pain to <or= 5/10 for CS for increased ease with ADL including head turns when driving car  Status at [facility]:pain ranging 3-9/10 for CS, 2-8/10 for LS    3. Improved LS stability to >or= Level 2 for increased ease with dynamic activities including housekeeping  Status at [facility]:LS stability Level 1    4. Ability to reach past mid shin (FFD < 5 inches) for increased ease putting on socks and shoes  Status at [facility]: FFD 8 inches, reports of LBP      Long Term Goals: To be accomplished in 8 weeks:(pending approval)   1. Improved FOTO score to >or= 47/100 (LB) and 42/100 (CS) as evidence of improved overall function  Status at [facility]: FOTO CS 43, LS 33    2.  Ability to perform regular plank for at least 10 seconds as evidence of improved core strength and function with ADL  Status at [facility]: marked core strength deficit, plank not tested during    3. Reduction in pain to <or= 3/10  For increased ease with ADL and daily ambulation  Status at Eval : pain levels up to 9/10 CS and 8/10 LB    4. Patient is independent with advanced HEP/symptom management and reports overall increase in activity level including daily ambulation  Status at Eval: sedentary activity level, limited ambulation    Frequency / Duration: Patient to be seen 3 times per week for 4 weeks. Patient/ Caregiver education and instruction: Diagnosis, prognosis, activity modification and exercises   [x]  Plan of care has been reviewed with AUGIE Clark PT 6/7/2017 9:54 AM  _____________________________________________________________________  I certify that the above Therapy Services are being furnished while the patient is under my care. I agree with the treatment plan and certify that this therapy is necessary. Physician's Signature:____________________  Date:__________Time:______    Please sign and return to   [facility] in [address]  [address]  Phone: [phone]      Fax:  [phone]

## 2017-06-08 ENCOUNTER — HOSPITAL ENCOUNTER (OUTPATIENT)
Dept: PHYSICAL THERAPY | Age: 42
End: 2017-06-08
Payer: MEDICAID

## 2017-06-13 ENCOUNTER — APPOINTMENT (OUTPATIENT)
Dept: PHYSICAL THERAPY | Age: 42
End: 2017-06-13
Payer: MEDICAID

## 2017-06-15 ENCOUNTER — APPOINTMENT (OUTPATIENT)
Dept: PHYSICAL THERAPY | Age: 42
End: 2017-06-15
Payer: MEDICAID

## 2017-06-20 ENCOUNTER — APPOINTMENT (OUTPATIENT)
Dept: PHYSICAL THERAPY | Age: 42
End: 2017-06-20
Payer: MEDICAID

## 2017-06-21 ENCOUNTER — APPOINTMENT (OUTPATIENT)
Dept: PHYSICAL THERAPY | Age: 42
End: 2017-06-21
Payer: MEDICAID

## 2017-06-22 ENCOUNTER — APPOINTMENT (OUTPATIENT)
Dept: PHYSICAL THERAPY | Age: 42
End: 2017-06-22
Payer: MEDICAID

## 2017-06-27 ENCOUNTER — APPOINTMENT (OUTPATIENT)
Dept: PHYSICAL THERAPY | Age: 42
End: 2017-06-27
Payer: MEDICAID

## 2017-06-28 ENCOUNTER — APPOINTMENT (OUTPATIENT)
Dept: PHYSICAL THERAPY | Age: 42
End: 2017-06-28
Payer: MEDICAID

## 2017-10-21 ENCOUNTER — HOSPITAL ENCOUNTER (EMERGENCY)
Age: 42
Discharge: HOME | End: 2017-10-21
Attending: EMERGENCY MEDICINE
Payer: MEDICAID

## 2017-10-21 VITALS
OXYGEN SATURATION: 98 % | TEMPERATURE: 97.8 F | BODY MASS INDEX: 36.7 KG/M2 | WEIGHT: 215 LBS | DIASTOLIC BLOOD PRESSURE: 84 MMHG | SYSTOLIC BLOOD PRESSURE: 137 MMHG | RESPIRATION RATE: 16 BRPM | HEART RATE: 78 BPM | HEIGHT: 64 IN

## 2017-10-21 DIAGNOSIS — M51.36 LUMBAR DEGENERATIVE DISC DISEASE: ICD-10-CM

## 2017-10-21 DIAGNOSIS — G89.29 ACUTE EXACERBATION OF CHRONIC LOW BACK PAIN: Primary | ICD-10-CM

## 2017-10-21 DIAGNOSIS — M54.50 ACUTE EXACERBATION OF CHRONIC LOW BACK PAIN: Primary | ICD-10-CM

## 2017-10-21 PROCEDURE — 74011250636 HC RX REV CODE- 250/636: Performed by: PHYSICIAN ASSISTANT

## 2017-10-21 PROCEDURE — 96372 THER/PROPH/DIAG INJ SC/IM: CPT

## 2017-10-21 PROCEDURE — 99283 EMERGENCY DEPT VISIT LOW MDM: CPT

## 2017-10-21 PROCEDURE — 74011250637 HC RX REV CODE- 250/637: Performed by: PHYSICIAN ASSISTANT

## 2017-10-21 RX ORDER — PREDNISONE 10 MG/1
TABLET ORAL
Qty: 21 TAB | Refills: 0 | Status: SHIPPED | OUTPATIENT
Start: 2017-10-21 | End: 2018-09-28

## 2017-10-21 RX ORDER — OXYCODONE AND ACETAMINOPHEN 5; 325 MG/1; MG/1
1 TABLET ORAL
Qty: 6 TAB | Refills: 0 | Status: SHIPPED | OUTPATIENT
Start: 2017-10-21 | End: 2019-05-13

## 2017-10-21 RX ORDER — OXYCODONE AND ACETAMINOPHEN 5; 325 MG/1; MG/1
1 TABLET ORAL
Status: COMPLETED | OUTPATIENT
Start: 2017-10-21 | End: 2017-10-21

## 2017-10-21 RX ORDER — KETOROLAC TROMETHAMINE 30 MG/ML
60 INJECTION, SOLUTION INTRAMUSCULAR; INTRAVENOUS
Status: COMPLETED | OUTPATIENT
Start: 2017-10-21 | End: 2017-10-21

## 2017-10-21 RX ADMIN — OXYCODONE HYDROCHLORIDE AND ACETAMINOPHEN 1 TABLET: 5; 325 TABLET ORAL at 14:52

## 2017-10-21 RX ADMIN — METHYLPREDNISOLONE SODIUM SUCCINATE 125 MG: 125 INJECTION, POWDER, FOR SOLUTION INTRAMUSCULAR; INTRAVENOUS at 13:51

## 2017-10-21 RX ADMIN — KETOROLAC TROMETHAMINE 60 MG: 30 INJECTION, SOLUTION INTRAMUSCULAR at 13:52

## 2017-10-21 NOTE — ED TRIAGE NOTES
C/o sharp back pain that is sharp and radiates down both legs while cleaning today. States she was mopping and sweeping. States she isn't able to move, called 911 who assisted her into her aunt's car and pt was brought to ED. Pt able to stand and get into a wheelchair at ED. Pt appears drowsy, denies taking any medications prior to ED arrival, states if she had medications to take she wouldn't need to be here. Sepsis Screening completed    (  )Patient meets SIRS criteria. (x )Patient does not meet SIRS criteria.       SIRS Criteria is achieved when two or more of the following are present   Temperature < 96.8°F (36°C) or > 100.9°F (38.3°C)   Heart Rate > 90 beats per minute   Respiratory Rate > 20 breaths per minute   WBC count > 12,000 or <4,000 or > 10% bands

## 2017-10-21 NOTE — ED NOTES
I have reviewed discharge instructions with the patient. The patient verbalized understanding.   Friend here to drive patient

## 2017-10-21 NOTE — ED NOTES
Patient attempting to contact ride at this time. Instructed to inform this RN when ride is present and verbalized understanding.

## 2017-10-21 NOTE — ED PROVIDER NOTES
[address]  EMERGENCY DEPARTMENT HISTORY AND PHYSICAL EXAM       Date: 10/21/2017   Patient Name: Itzel Perkins   YOB: 1975  Medical Record Number: [id number]    History of Presenting Illness     Chief Complaint   Patient presents with    Back Pain        History Provided By:  patient    Additional History: 1:10 PM  Itzel Perkins is a 43 y.o. female who presents to the emergency department C/O back pain that radiates into legs which makes it hard to move onset earlier today after cleaning home. Associated sx's include dysuria and grain numbness. Pt mentions having to call EMT to help her get into her aunt's car to come here. Pt tried muscle relaxer's, naproxen and aleve for pain relief. PMHX of bulging pam and herniates disk. PSHx back pain. Pt denies fall, fecal or urinary incontinence or any other sx's or complaints.     Primary Care Provider: Faisal Irene MD   Specialist:    Past History     Past Medical History:   Past Medical History:   Diagnosis Date    Anxiety     Arrhythmia     Chronic pain     Diabetes ([address]     Dysuria     Gastrointestinal disorder     colitis    Heart murmur     Hematuria     HPV (human papilloma virus) infection     Hypertension     Hypothyroidism     Ischemic colitis ([address]     MS (multiple sclerosis) ([address]     Other ill-defined conditions(799.89)     prescription drug addiction    Pain management     Psychiatric disorder     depression, bipolar anxiety, ocd    Rapid heart rate     Seizures (HCC)     Tattoo     Tattoos    Vitamin D deficiency     Vulvar pain         Past Surgical History:   Past Surgical History:   Procedure Laterality Date    HX ORTHOPAEDIC      back surgery, ulnar nerve    HX ORTHOPAEDIC      back surgery x 2    HX OTHER SURGICAL      ulner nerve surgery    HX TUBAL LIGATION          Family History:   Family History   Problem Relation Age of Onset    Diabetes Mother     Cancer Maternal Grandmother  Breast Cancer Maternal Aunt     Thyroid Disease Maternal Aunt         Social History:   Social History   Substance Use Topics    Smoking status: Current Every Day Smoker     Packs/day: 1.50     Types: Cigarettes     Last attempt to quit: 7/10/2017    Smokeless tobacco: Never Used      Comment: pt stated she quit 2 weeks ago    Alcohol use No      Comment: occasionally        Allergies: Allergies   Allergen Reactions    Sulfatrim Ds Myalgia    Sulfa (Sulfonamide Antibiotics) Other (comments)    Sulfa (Sulfonamide Antibiotics) Myalgia    Penicillins Rash        Review of Systems   Review of Systems   Genitourinary: Positive for dysuria. Musculoskeletal: Positive for back pain and myalgias. All other systems reviewed and are negative. Physical Exam  Vitals:    10/21/17 1311   BP: 103/74   Pulse: 84   Resp: 16   Temp: 97.8 °F (36.6 °C)   SpO2: 98%   Weight: 97.5 kg (215 lb)   Height: 5' 4\" (1.626 m)       Physical Exam   Constitutional: She is oriented to person, place, and time. She appears well-developed and well-nourished. She appears distressed (mild pain distress). HENT:   Head: Normocephalic and atraumatic. Neck: Normal range of motion. Neck supple. Cardiovascular: Normal rate, regular rhythm and normal heart sounds. Pulmonary/Chest: Effort normal and breath sounds normal.   Musculoskeletal:        Back:    Neurological: She is alert and oriented to person, place, and time. Skin: Skin is warm and dry. No rash noted. She is not diaphoretic. No erythema. Psychiatric: She has a normal mood and affect. Her behavior is normal.   Nursing note and vitals reviewed. Diagnostic Study Results     Labs -    No results found for this or any previous visit (from the past 12 hour(s)). Radiologic Studies -  The following have been ordered and reviewed:  No orders to display           Medical Decision Making   I am the first provider for this patient.      I reviewed the vital signs, available nursing notes, past medical history, past surgical history, family history and social history. Vital Signs-Reviewed the patient's vital signs. Patient Vitals for the past 12 hrs:   Temp Pulse Resp BP SpO2   10/21/17 1311 97.8 °F (36.6 °C) 84 16 103/74 98 %     Procedures:   Procedures    ED Course:  1:10 PM  Initial assessment performed. The patients presenting problems have been discussed, and they are in agreement with the care plan formulated and outlined with them. I have encouraged them to ask questions as they arise throughout their visit. Medications Given in the ED:  Medications   oxyCODONE-acetaminophen (PERCOCET) 5-325 mg per tablet 1 Tab (not administered)   ketorolac tromethamine (TORADOL) 60 mg/2 mL injection 60 mg (60 mg IntraMUSCular Given 10/21/17 1352)   methylPREDNISolone (PF) (SOLU-MEDROL) injection 125 mg (125 mg IntraMUSCular Given 10/21/17 1351)     MDM: 37yo F with history of chronic neck and low back pain; no new injury or fall; pain exacerbated by housework today, feels like back \"locked up. \" VSS. No evidence of cauda equina. Takes muscle relaxers, gabapentin and baclofen intermittently for pain, not working today. Given Toradol, Solumedrol and Percocet in ED. Review of  shows no narcotics rx'ed in recent past, but does take Clonazepam occasionally; will give 2 days only (#6 pills) of Percocet and instructed to follow up with PCP and Ortho as scheduled. Instructed to not take Clonazepam with Percocet. Discharge Note:  2:18 PM  Pt has been reexamined. Patient has no new complaints, changes, or physical findings. Care plan outlined and precautions discussed. Results were reviewed with the patient. All medications were reviewed with the patient; will d/c home. All of pt's questions and concerns were addressed. Patient was instructed and agrees to follow up with ortho, as well as to return to the ED upon further deterioration.  Patient is ready to go home.    Diagnosis   Clinical Impression:   1. Acute exacerbation of chronic low back pain    2. Lumbar degenerative disc disease         Discussion:  Follow-up Information     Follow up With Details Comments [address], [place] Schedule an appointment as soon as possible for a visit in 2 days  [address]      Tabitha Laboy MD Schedule an appointment as soon as possible for a visit in 2 days  [place] [address]  [phone]      [facility] EMERGENCY DEPT  As needed, If symptoms worsen [address]  [phone]          Current Discharge Medication List      START taking these medications    Details   oxyCODONE-acetaminophen (PERCOCET) 5-325 mg per tablet Take 1 Tab by mouth every eight (8) hours as needed for Pain. Max Daily Amount: 3 Tabs. Qty: 6 Tab, Refills: 0      predniSONE (STERAPRED DS) 10 mg dose pack Use per pack instructions. Qty: 21 Tab, Refills: 0             _______________________________   Attestations: This note is prepared by Letty Augustine , acting as a Scribe for [employer] on 1:06 PM on 10/21/2017 . [employer]: The scribe's documentation has been prepared under my direction and personally reviewed by me in its entirety.   _______________________________

## 2017-10-21 NOTE — DISCHARGE INSTRUCTIONS
Back Pain: Care Instructions  Your Care Instructions    Back pain has many possible causes. It is often related to problems with muscles and ligaments of the back. It may also be related to problems with the nerves, discs, or bones of the back. Moving, lifting, standing, sitting, or sleeping in an awkward way can strain the back. Sometimes you don't notice the injury until later. Arthritis is another common cause of back pain. Although it may hurt a lot, back pain usually improves on its own within several weeks. Most people recover in 12 weeks or less. Using good home treatment and being careful not to stress your back can help you feel better sooner. Follow-up care is a key part of your treatment and safety. Be sure to make and go to all appointments, and call your doctor if you are having problems. Its also a good idea to know your test results and keep a list of the medicines you take. How can you care for yourself at home? · Sit or lie in positions that are most comfortable and reduce your pain. Try one of these positions when you lie down:  ¨ Lie on your back with your knees bent and supported by large pillows. ¨ Lie on the floor with your legs on the seat of a sofa or chair. Marcel Lares on your side with your knees and hips bent and a pillow between your legs. ¨ Lie on your stomach if it does not make pain worse. · Do not sit up in bed, and avoid soft couches and twisted positions. Bed rest can help relieve pain at first, but it delays healing. Avoid bed rest after the first day of back pain. · Change positions every 30 minutes. If you must sit for long periods of time, take breaks from sitting. Get up and walk around, or lie in a comfortable position. · Try using a heating pad on a low or medium setting for 15 to 20 minutes every 2 or 3 hours. Try a warm shower in place of one session with the heating pad. · You can also try an ice pack for 10 to 15 minutes every 2 to 3 hours.  Put a thin cloth between the ice pack and your skin. · Take pain medicines exactly as directed. ¨ If the doctor gave you a prescription medicine for pain, take it as prescribed. ¨ If you are not taking a prescription pain medicine, ask your doctor if you can take an over-the-counter medicine. · Take short walks several times a day. You can start with 5 to 10 minutes, 3 or 4 times a day, and work up to longer walks. Walk on level surfaces and avoid hills and stairs until your back is better. · Return to work and other activities as soon as you can. Continued rest without activity is usually not good for your back. · To prevent future back pain, do exercises to stretch and strengthen your back and stomach. Learn how to use good posture, safe lifting techniques, and proper body mechanics. When should you call for help? Call your doctor now or seek immediate medical care if:  · You have new or worsening numbness in your legs. · You have new or worsening weakness in your legs. (This could make it hard to stand up.)  · You lose control of your bladder or bowels. Watch closely for changes in your health, and be sure to contact your doctor if:  · Your pain gets worse. · You are not getting better after 2 weeks. Where can you learn more? Go to http://lashay-marty.info/. Enter H843 in the search box to learn more about \"Back Pain: Care Instructions. \"  Current as of: March 21, 2017  Content Version: 11.3  © 5238-0194 [employer]. Care instructions adapted under license by [employer] (which disclaims liability or warranty for this information). If you have questions about a medical condition or this instruction, always ask your healthcare professional. [address] any warranty or liability for your use of this information.

## 2017-10-21 NOTE — ED NOTES
Assumed care of patient. Patient presents complaining of severe lower back pain that radiates down BLE onset this morning while cleaning her apartment. Patient reports a history of chronic back pain due to herniated discs. Patient denies any numbness or tingling. Patient denies any other symptoms. Patient medicated per MAR orders. Verified order, patient identification, and allergies prior to administration. Call bell within reach of patient. Will continue to monitor and assess.

## 2017-11-21 ENCOUNTER — HOSPITAL ENCOUNTER (OUTPATIENT)
Dept: MRI IMAGING | Age: 42
Discharge: HOME | End: 2017-11-21
Attending: PSYCHIATRY & NEUROLOGY
Payer: MEDICAID

## 2017-11-21 DIAGNOSIS — M54.12 CERVICAL RADICULOPATHY: ICD-10-CM

## 2017-11-21 DIAGNOSIS — M54.17 L-S RADICULOPATHY: ICD-10-CM

## 2017-11-21 PROCEDURE — 72156 MRI NECK SPINE W/O & W/DYE: CPT

## 2017-11-21 PROCEDURE — 72158 MRI LUMBAR SPINE W/O & W/DYE: CPT

## 2017-11-21 PROCEDURE — A9577 INJ MULTIHANCE: HCPCS | Performed by: PSYCHIATRY & NEUROLOGY

## 2017-11-21 PROCEDURE — 74011250636 HC RX REV CODE- 250/636: Performed by: PSYCHIATRY & NEUROLOGY

## 2017-11-21 RX ADMIN — GADOBENATE DIMEGLUMINE 15 ML: 529 INJECTION, SOLUTION INTRAVENOUS at 13:46

## 2018-03-21 ENCOUNTER — HOSPITAL ENCOUNTER (OUTPATIENT)
Dept: PHYSICAL THERAPY | Age: 43
Discharge: HOME | End: 2018-03-21
Payer: MEDICAID

## 2018-03-21 PROCEDURE — 97110 THERAPEUTIC EXERCISES: CPT

## 2018-03-21 PROCEDURE — 97530 THERAPEUTIC ACTIVITIES: CPT

## 2018-03-21 PROCEDURE — 97163 PT EVAL HIGH COMPLEX 45 MIN: CPT

## 2018-03-21 NOTE — PROGRESS NOTES
PT DAILY TREATMENT NOTE/CERVICAL EVAL3-16    Patient Name: Luli Brian  Date:3/21/2018  : 1975  [x]  Patient  Verified  Payor: [facility] MEDICAID / [facility] / Product Type: Managed Care Medicaid /    In time:110  Out time:2  Total Treatment Time (min): 50  Total Timed Codes (min): 50  1:1 Treatment Time ( only): n/a   Visit #: 1 of 8    Treatment Area: Neck pain [M54.2]  Back pain, thoracic [M54.6]    SUBJECTIVE  Pain Level (0-10 scale): 8  [x]constant []intermittent []improving []worsening []no change since onset    Any medication changes, allergies to medications, adverse drug reactions, diagnosis change, or new procedure performed?: [x] No    [] Yes (see summary sheet for update)  Subjective functional status/changes:majority of pain is in my neck and going into my arms. Also pain in upper back and LB. I am very inactive. Have to lie down down most of the day. Living with daughter in an apartment with one flight of stairs. Both on disability due to mental status. Patient since age 13. Does occasional cooking, daughter does the shopping. Reports numbness throughout both arms and both legs/feet.     PLOF: chronic sedentary life style, previous trial of PT though patient always quit or attended only occasionally  Limitations to PLOF: limited endurance   Mechanism of Injury: n/a  Current symptoms/Complaints: pain, lack of endurance  Previous Treatment/Compliance: poor compliance  PMHx/Surgical Hx: n/a  Work Hx: patient has never worked due to disability  Living Situation: with daughter  Pt Goals: I want to become stronger and try to survive  Barriers: []pain []financial []time []transportation []other  Motivation: goo currently  Substance use: []Alcohol []Tobacco []other:   FABQ Score: []low []elevate  Cognition: A & O x 3    Other:    OBJECTIVE/EXAMINATION  Domestic Life: as aboved  Activity/Recreational Limitations: none, unable to exercise  Mobility: limited  Self Care: independent with bathing, reports difficulty        Modality rationale: Pain control   Min Type Additional Details    [] Estim:  []Unatt       []IFC  []Premod                        []Other:  []w/ice   []w/heat  Position:  Location:    [] Estim: []Att    []TENS instruct  []NMES                    []Other:  []w/US   []w/ice   []w/heat  Position:  Location:    []  Traction: [] Cervical       []Lumbar                       [] Prone          []Supine                       []Intermittent   []Continuous Lbs:  [] before manual  [] after manual    []  Ultrasound: []Continuous   [] Pulsed                           []1MHz   []3MHz Location:  W/cm2:    []  Iontophoresis with dexamethasone         Location: [] Take home patch   [] In clinic    []  Ice     []  heat  []  Ice massage  []  Laser   []  Anodyne Position:  Location:    []  Laser with stim  []  Other: Position:  Location:    []  Vasopneumatic Device Pressure:       [] lo [] med [] hi   Temperature: [] lo [] med [] hi   [] Skin assessment post-treatment:  []intact []redness- no adverse reaction    []redness  adverse reaction:     15 min [x]Eval                  []Re-Eval       25 min Therapeutic Exercise:  [] See flow sheet :   Rationale: increase ROM, increase strength and condition to improve the patients ability to return to more active life style    10 min Therapeutic Activity:  [x]  See flow sheet :   Rationale: understanding of treatment rationale and patient engagement             With   [] TE   [] TA   [] neuro   [] other: Patient Education: [x] Review HEP    [] Progressed/Changed HEP based on:   [] positioning   [] body mechanics   [] transfers   [] heat/ice application    [] other:      Other Objective/Functional Measures: as per evaluation    Physical Therapy Evaluation Cervical Spine     SUBJECTIVE  Chief Complaint:back pain servando CS    Mechanism of injury:n/a    Symptoms  Aggravated by:   [x] Bending [x] Sitting [x] Standing [x] Reaching Overhead   [] Moving [x] Cough [x] Sneeze [] Eating   [x] AM  [x] PM  Lying:  [] sup   [] pro   [] sidelying   [] Other:     Eased by:    [] Bending [] Sitting [] Standing Lying: [] sup  [] pro  [] sidelying   [x] Moving [] AM  [] PM  [x] Other:MH, rest, lying down     General Health:  Red Flags Indicated? [] Yes    [x] No  [] Yes [] No Recent weight change (If yes, due to dieting? [] Yes  [] No)   [] Yes [] No Persistent cough  [] Yes [] No Unremitting pain at night  [x] Yes [] No Dizziness  [x] Yes [] No Blurred vision, has not been wearing her glasses  [] Yes [] No Hands more cold or painful in cold weather  [x] Yes [] No Ringing in ears, right  [] Yes [] No Difficulty swallowing  [] Yes [] No Dysfunction of bowel or bladder  [] Yes [] No Recent illness within past 3 weeks (i.e, cold, flu)  [] Yes [] No Jaw pain    Past History/Treatments:    Diagnostic Tests: [] Lab work [] X-rays    [] CT [x] MRI     [] Other:  Results:    Functional Status  Prior level of function:on disability, chronic limitation  Present functional limitations:limited function  What position do you sleep in?:SL    Headaches:only occasional   Do you have headaches? [x] Yes   [] No  How often do you get headaches? How long does the headache last?  What aggravates it? What relieves it? Does the headache coincide with any other symptoms (visual disturbances, light sensitivity)? Where is the headache? Does it change locations?   Other:    OBJECTIVE  Posture: [] WNL  Head Position:marked FHP, Dowager's hump, distance between head to occiput in standing is 4 inches  [id number]/Scapular Position:  C-Kyphosis:  [] increased   [x] decreased   C-Lordosis:   [x] increased   [] decreased  T-Kyphosis:  [] increased   [] decreased  L-Lordosis:   [] increased   [x] decreased     TMJ: [] N/A [] Abnormal - ROM:   Palpation:bilateral TMJ pain, teeth clenching, grinding    Cervical Retraction: [] WNL    [x] Abnormal:limited retraction    Shoulder/Scapular Screen: [] WNL    [] Abnormal:    Active Movements: [] N/A   [] Too acute   [] Other:  ROM % AROM % PROM Comments:pain, area   Forward flexion 30P     Extension 20P     SB right 15P     SB left  20     Rotation right 30     Rotation left 50     Shoulder Flex:130  TS ROM:Flex/Ext and Rot 50% without pain  LS:Flex 50%, FFD to 11 inches, Ext 25% with stiffness, SB 75%    Thoracic Spine: [] N/A    [] WNL   [x] Other:restricted, flat TS    PROM:    Palpation:  [] Min  [x] Mod  [] Severe    Location:CS, TS, LS paraspinals, CT junction,   [] Min  [x] Mod  [] Severe    Location:CS facet joints, UT, scalenes, anterior chest, periscapular region  [] Min  [] Mod  [] Severe    Location:    Neuro Screen (myotome/dematome/felexes): [] WNL  Myotome Level Muscle Test Myotome Level Muscle Test   C5 Shoulder Adduction - Deltoid C8 Finger Flexors   C6 Wrist Extension T1 Finger Abduction - Interossei   C7 Elbow Extension     Comments:  Upper Limb Tension Tests: [] N/A       Ulnar: [] R    [] L    [] +    [] -       Median: [] R    [] L    [] +    [] -       Radial: [] R    [] L    [] +    [] -    Special Tests:  Cervical:        Vertebral Artery:  [] R    [] L    [] +    [] -       Alar Ligament: [] R    [] L    [] +    [] -       Transverse Lig: [] R    [] L    [] +    [] -       Spurling's:  [] R    [] L    [] +    [] -       Distraction:  [] R    [] L    [] +    [] -       Compression: [] R    [] L    [] +    [] -    Thoracic Outlet Tests: [] N/A       Adson's:  [] R    [] L    [] +    [] -       Hyperabduction: [] R    [] L    [] +    [] -       Renan's:  [] R    [] L    [] +    [] -       Dona:  [] R    [] L    [] +    [] -    Diaphragmatic Breathing: [] Normal    [x] Abnormal: shallow breathing pattern    Muscle Flexibility: [] N/A   Scalenes: [] WNL    [x] Tight    [x] R    [x] L   Upper Trap: [] WNL    [x] Tight    [x] R    [x] L   Levator: [] WNL    [x] Tight    [x] R    [x] L   Pect.  Minor: [] WNL    [x] Tight    [x] R    [x] L    Global Muscular Weakness: [] N/A   Lower Trap:3/5   Rhomboids:4-   Middle Trap:4-   Serratus Ant:4   Ext Rotators:4   Other:Hip Flex right 4- trembling, left 4  Gross strength UE's: 4/5  Core 3-/5    Other tests/comments:general deconditioned stat:  5 min TM walking test: speed 1.5 mph,  105  SR/EO 10 sec, ECmax 3 sec  SLS right 10 sec, left 5    Pain Level (0-10 scale) post treatment: 7    ASSESSMENT/Changes in Function: patient able to perform all activities without reported increase in pain or antalgia    Patient will continue to benefit from skilled PT services to address ROM deficits, address strength deficits, analyze and address soft tissue restrictions, analyze and cue movement patterns and assess and modify postural abnormalities to attain remaining goals.      [x]  See Plan of Care  []  See progress note/recertification  []  See Discharge Summary         Progress towards goals / Updated goals:  Patient appears motivated and willing to try ex program    PLAN  []  Upgrade activities as tolerated     [x]  Continue plan of care  []  Update interventions per flow sheet       []  Discharge due to:_  []  Other:_      Alex Romeo PT 3/21/2018  1:14 PM

## 2018-03-26 ENCOUNTER — HOSPITAL ENCOUNTER (OUTPATIENT)
Dept: PHYSICAL THERAPY | Age: 43
Discharge: HOME | End: 2018-03-26
Payer: MEDICAID

## 2018-03-26 PROCEDURE — 97014 ELECTRIC STIMULATION THERAPY: CPT

## 2018-03-26 PROCEDURE — 97530 THERAPEUTIC ACTIVITIES: CPT

## 2018-03-26 PROCEDURE — 97110 THERAPEUTIC EXERCISES: CPT

## 2018-03-26 NOTE — PROGRESS NOTES
[facility] in [address] [place], [facility] [address]  Phone: [phone]      Fax:  [phone]    Plan of Care/ Statement of Necessity for Physical Therapy Services       Patient name: Tito Medrano Start of Care: 3/25/2018   Referral source: Agnieszka Zambrano MD : 1975    Medical Diagnosis: Neck pain [M54.2]  Back pain, thoracic [M54.6]   Onset Date:chronic progressive pain    Treatment Diagnosis: weakness, chronic pain, deconditioning   Prior Hospitalization: see medical history Provider#: [id number]   Medications: Verified on Patient summary List    Comorbidities: fibromyalgia,epression, bipolar disorder, arthritis, thyroid problems, high BP   Prior Level of Function: limited function, disability, sedentary life style      The Plan of Care and following information is based on the information from the initial evaluation. Assessment/ key information: 43 YOF with reports of fluctuating pain 5-8/10 primarily in CS but also in trunk, LB and extremities. Patient is presenting to PT willing to make some life style changes and to start exercising. Objective findings include pain at rest and with activity primarily in CS, limited spinal ROM, pain ranging from 5-8/10, deficit in strength/balance  and general debilitation. She is a good candidate for skilled PT to address deficits in strength, function, mobility and pain. Self reported FOTO score of 29/100 indicates limited function. Evaluation Complexity History HIGH Complexity :3+ comorbidities / personal factors will impact the outcome/ POC ; Examination MEDIUM Complexity : 3 Standardized tests and measures addressing body structure, function, activity limitation and / or participation in recreation  ;Presentation MEDIUM Complexity : Evolving with changing characteristics  ; Clinical Decision Making MEDIUM Complexity : FOTO score of 26-74  Overall Complexity Rating: MEDIUM  Problem List: pain affecting function, decrease ROM, decrease strength, impaired gait/ balance, decrease ADL/ functional abilitiies and decrease activity tolerance   Treatment Plan may include any combination of the following: Therapeutic exercise, Therapeutic activities, Neuromuscular re-education, Physical agent/modality, Gait/balance training, Manual therapy and Patient education  Patient / Family readiness to learn indicated by: trying to perform skills and interest  Persons(s) to be included in education: patient (P)  Barriers to Learning/Limitations: None  Patient Goal (s): I want to be more active again  Patient Self Reported Health Status: fair  Rehabilitation Potential: good    Short Term Goals: To be accomplished in 2 weeks:   1. Patient is attending therapy at least 2 x weekly and shows compliance with HEP to assure progress  Status at [facility]: HEP initiated, discussed need to attend therapy on regular basis, discussed NS policy    2. Reduction in pain to <or= 5/10 with ADL to allow increased ADL including daily ambulation  Status at [facility]: pain ranging 5-8/10, sedentary, taking several naps during the day      Long Term Goals: To be accomplished in 4 weeks:   1. Improved FOTO score to >or= 41/100 to allow performance of housework including use of broom with moderate difficulty  Status at [facility]: FOTO LS 29/100    2. Improved FOTO score to >or=43/100 to allow looking up at a bird and placing a can onto a shelf at l=shoulder level with minimal to moderate difficulty  Status at [facility]:FOTO 32/100, quite a bit of difficulty with above tasks    3. Improved cardiovascular endurance to allow TM ambulation >or= 15 minutes without significant SOB  Status at [facility]:^MWT to be performed during 2nd visit    4. Functional spinal mobility and ability to reach past mid shin <or= 4\" FFD to allow functional reaching to floor level for housekeeping chores  Status at [facility]: FFD 11 inches    5.  Reduction in pain to <or= 3/10 at rest to get optimal rest at night and reduce overall fatigue  Status at [facility]: pain ranging 5-8/10  Frequency / Duration: Patient to be seen 2 times per week for 4 weeks. Patient/ Caregiver education and instruction: Diagnosis, prognosis, self care, activity modification and exercises   [x]  Plan of care has been reviewed with AUGIE Villafuerte, PT 3/25/2018 10:14 PM  _____________________________________________________________________  I certify that the above Therapy Services are being furnished while the patient is under my care. I agree with the treatment plan and certify that this therapy is necessary. Physician's Signature:____________________  Date:__________Time:______    Please sign and return to   [facility] in [address]  Becky Pozo, [address] [place] [address]  Phone: [phone]      Fax:  [phone]

## 2018-03-26 NOTE — PROGRESS NOTES
PT DAILY TREATMENT NOTE     Patient Name: Berekte Billings  Date:3/26/2018  : 1975  [x]  Patient  Verified  Payor: [facility] MEDICAID / Plan: [place] [employer] / Product Type: Managed Care Medicaid /    In time:9  Out time:950  Total Treatment Time (min): 50  Visit #: 2 of 8    Treatment Area: Neck pain [M54.2]  Back pain, thoracic [M54.6]    SUBJECTIVE  Pain Level (0-10 scale): 7  Any medication changes, allergies to medications, adverse drug reactions, diagnosis change, or new procedure performed?: [x] No    [] Yes (see summary sheet for update)  Subjective functional status/changes:   [] No changes reported  Reports some soreness but otherwise good tolerance to first visit    OBJECTIVE    Modality rationale: Pain control   Min Type Additional Details   15 [x] Estim:  []Unatt       [x]IFC  []Premod                        []Other:  []w/ice   []w/heat  Position:90/90  Location:L    [] Estim: []Att    []TENS instruct  []NMES                    []Other:  []w/US   []w/ice   []w/heat  Position:  Location:    []  Traction: [] Cervical       []Lumbar                       [] Prone          []Supine                       []Intermittent   []Continuous Lbs:  [] before manual  [] after manual    []  Ultrasound: []Continuous   [] Pulsed                           []1MHz   []3MHz W/cm2:  Location:    []  Iontophoresis with dexamethasone         Location: [] Take home patch   [] In clinic    []  Ice     []  heat  []  Ice massage  []  Laser   []  Anodyne Position:  Location:    []  Laser with stim  []  Other:  Position:  Location:    []  Vasopneumatic Device Pressure:       [] lo [] med [] hi   Temperature: [] lo [] med [] hi   [] Skin assessment post-treatment:  []intact []redness- no adverse reaction    []redness  adverse reaction:         25 min Therapeutic Exercise:  [x] See flow sheet :   Rationale: increase ROM and increase strength to improve the patients ability to return to more active life style    10 min Therapeutic Activity:  [x]  See flow sheet :6 MWT, review of HEP   Rationale: increase ROM, increase strength and increase proprioception  to improve the patients ability to return to more active life style      min Neuromuscular Re-education:  []  See flow sheet :        min Manual Therapy:                   With   [] TE   [] TA   [] neuro   [] other: Patient Education: [x] Review HEP    [] Progressed/Changed HEP based on:   [] positioning   [] body mechanics   [] transfers   [] heat/ice application    [] other:      Other Objective/Functional Measures: quick fatigue with ex, reports LBP, relieve with SKC     Pain Level (0-10 scale) post treatment: 5    ASSESSMENT/Changes in Function: initial c/o increased pain with bridging but reduction in symptoms with improved form and SKC stretching    Patient will continue to benefit from skilled PT services to address ROM deficits, address strength deficits, analyze and address soft tissue restrictions, analyze and cue movement patterns and assess and modify postural abnormalities to attain remaining goals. [x]  See Plan of Care  []  See progress note/recertification  []  See Discharge Summary         Progress towards goals / Updated goals:  Short Term Goals: To be accomplished in 2 weeks:                           1. Patient is attending therapy at least 2 x weekly and shows compliance with HEP to assure progress  Status at [facility]: HEP initiated, discussed need to attend therapy on regular basis, discussed NS policy  Current status: reports compliance with HEP, no questions     2. Reduction in pain to <or= 5/10 with ADL to allow increased ADL including daily ambulation  Status at [facility]: pain ranging 5-8/10, sedentary, taking several naps during the day        Long Term Goals:  To be accomplished in 4 weeks:                           1. Improved FOTO score to >or= 41/100 to allow performance of housework including use of broom with moderate difficulty  Status at [facility]: FOTO LS 29/100     2. Improved FOTO score to >or=43/100 to allow looking up at a bird and placing a can onto a shelf at l=shoulder level with minimal to moderate difficulty  Status at [facility]:FOTO 32/100, quite a bit of difficulty with above tasks     3. Improved cardiovascular endurance to allow TM ambulation >or= 15 minutes without significant SOB  Status at [facility]:^MWT to be performed during 2nd visit     4.  Functional spinal mobility and ability to reach past mid shin <or= 4\" FFD to allow functional reaching to floor level for housekeeping chores  Status at [facility]: FFD 11 inches     5. Reduction in pain to <or= 3/10 at rest to get optimal rest at night and reduce overall fatigue  Status at [facility]: pain ranging 5-8/10    PLAN  []  Upgrade activities as tolerated     [x]  Continue plan of care  []  Update interventions per flow sheet       []  Discharge due to:_  []  Other:_      Nicky Heller PT 3/26/2018  9:27 AM    Future Appointments  Date Time Provider Fernanda Owens   3/28/2018 9:00 AM [facility] Page [facility]   3/29/2018 8:30 AM SKYE Dumont [facility]   4/2/2018 9:00 AM Hunter ALDANA [facility]   4/4/2018 10:00 AM Hunter ALDANA [facility]   4/9/2018 9:30 AM SKYE Page THE [facility]   4/11/2018 9:30 AM SKYE Alegria [facility]

## 2018-03-28 ENCOUNTER — HOSPITAL ENCOUNTER (OUTPATIENT)
Dept: PHYSICAL THERAPY | Age: 43
Discharge: HOME | End: 2018-03-28
Payer: MEDICAID

## 2018-03-28 PROCEDURE — 97110 THERAPEUTIC EXERCISES: CPT

## 2018-03-28 PROCEDURE — 97014 ELECTRIC STIMULATION THERAPY: CPT

## 2018-03-28 NOTE — PROGRESS NOTES
PT DAILY TREATMENT NOTE     Patient Name: Jason Dean  Date:3/28/2018  : 1975  [x]  Patient  Verified  Payor: [facility] MEDICAID / Plan: [place] [employer] / Product Type: Managed Care Medicaid /    In time:9  Out time:945  Total Treatment Time (min): 45  Visit #: 2 of 8    Treatment Area: Neck pain [M54.2]  Back pain, thoracic [M54.6]    SUBJECTIVE  Pain Level (0-10 scale): 4  Any medication changes, allergies to medications, adverse drug reactions, diagnosis change, or new procedure performed?: [x] No    [] Yes (see summary sheet for update)  Subjective functional status/changes:   [] No changes reported  Reports increased back pain after last visit. \"I think it was too much. \"  Unable to do ex due to increased LBP.  Also lost print out    OBJECTIVE    Modality rationale: Pain control   Min Type Additional Details   15 [x] Estim:  []Unatt       [x]IFC  []Premod                        []Other:  []w/ice   []w/heat  Position:/  Location:L    [] Estim: []Att    []TENS instruct  []NMES                    []Other:  []w/US   []w/ice   []w/heat  Position:  Location:    []  Traction: [] Cervical       []Lumbar                       [] Prone          []Supine                       []Intermittent   []Continuous Lbs:  [] before manual  [] after manual    []  Ultrasound: []Continuous   [] Pulsed                           []1MHz   []3MHz W/cm2:  Location:    []  Iontophoresis with dexamethasone         Location: [] Take home patch   [] In clinic    []  Ice     []  heat  []  Ice massage  []  Laser   []  Anodyne Position:  Location:    []  Laser with stim  []  Other:  Position:  Location:    []  Vasopneumatic Device Pressure:       [] lo [] med [] hi   Temperature: [] lo [] med [] hi   [] Skin assessment post-treatment:  []intact []redness- no adverse reaction    []redness  adverse reaction:         30 min Therapeutic Exercise:  [x] See flow sheet :initiated bio ex for LS stability   Rationale: increase ROM and increase strength to improve the patients ability to return to more active life style     min Therapeutic Activity:  [x]  See flow sheet :         min Neuromuscular Re-education:  []  See flow sheet :        min Manual Therapy:                   With   [] TE   [] TA   [] neuro   [] other: Patient Education: [x] Review HEP    [] Progressed/Changed HEP based on:   [] positioning   [] body mechanics   [] transfers   [] heat/ice application    [] other:      Other Objective/Functional Measures: challenged with bio ex but eventually able to perform with fairly good control     Pain Level (0-10 scale) post treatment: 5    ASSESSMENT/Changes in Function: initial c/o increased pain with bridging but reduction in symptoms with improved form and SKC stretching    Patient will continue to benefit from skilled PT services to address ROM deficits, address strength deficits, analyze and address soft tissue restrictions, analyze and cue movement patterns and assess and modify postural abnormalities to attain remaining goals. [x]  See Plan of Care  []  See progress note/recertification  []  See Discharge Summary         Progress towards goals / Updated goals:  Short Term Goals: To be accomplished in 2 weeks:                           1. Patient is attending therapy at least 2 x weekly and shows compliance with HEP to assure progress  Status at [facility]: HEP initiated, discussed need to attend therapy on regular basis, discussed NS policy  Current status: reports compliance with HEP, no questions     2. Reduction in pain to <or= 5/10 with ADL to allow increased ADL including daily ambulation  Status at [facility]: pain ranging 5-8/10, sedentary, taking several naps during the day        Long Term Goals: To be accomplished in 4 weeks:                           1. Improved FOTO score to >or= 41/100 to allow performance of housework including use of broom with moderate difficulty  Status at [facility]: FOTO LS 29/100     2.  Improved FOTO score to >or=43/100 to allow looking up at a bird and placing a can onto a shelf at l=shoulder level with minimal to moderate difficulty  Status at [facility]:FOTO 32/100, quite a bit of difficulty with above tasks     3. Improved cardiovascular endurance to allow TM ambulation >or= 15 minutes without significant SOB  Status at [facility]:^MWT to be performed during 2nd visit     4.  Functional spinal mobility and ability to reach past mid shin <or= 4\" FFD to allow functional reaching to floor level for housekeeping chores  Status at [facility]: FFD 11 inches     5. Reduction in pain to <or= 3/10 at rest to get optimal rest at night and reduce overall fatigue  Status at [facility]: pain ranging 5-8/10    PLAN  []  Upgrade activities as tolerated     [x]  Continue plan of care  []  Update interventions per flow sheet       []  Discharge due to:_  []  Other:_      Noble Lawrence PT 3/28/2018  9:27 AM    Future Appointments  Date Time Provider Fernanda Owens   3/29/2018 8:30 AM Noble Lawrence PT [facility]   4/2/2018 9:00 AM Chacha ALDANA [facility]   4/4/2018 10:00 AM Chacha ALDANA [facility]   4/9/2018 9:30 AM Eula Medina PT [facility]   4/11/2018 9:30 AM Noble Lawrence PT [facility]   4/12/2018 10:00 AM Noble Waggonerria, PT [facility]   4/16/2018 11:00 AM Pedroe Rosalineria, PT [facility]   4/17/2018 9:00 AM Noble Waggonerria, PT [facility]   4/19/2018 9:30 AM Noble Waggonerria, PT [facility]   4/23/2018 11:00 AM Noble Cisnerosa, PT [facility]   4/26/2018 11:00 AM SKYE Cain THE [facility]   4/27/2018 9:00 AM SKYE Cain THE [facility]

## 2018-03-29 ENCOUNTER — APPOINTMENT (OUTPATIENT)
Dept: PHYSICAL THERAPY | Age: 43
End: 2018-03-29
Payer: MEDICAID

## 2018-04-02 ENCOUNTER — HOSPITAL ENCOUNTER (OUTPATIENT)
Dept: PHYSICAL THERAPY | Age: 43
End: 2018-04-02
Payer: MEDICAID

## 2018-04-04 ENCOUNTER — HOSPITAL ENCOUNTER (OUTPATIENT)
Dept: PHYSICAL THERAPY | Age: 43
Discharge: HOME | End: 2018-04-04
Payer: MEDICAID

## 2018-04-04 PROCEDURE — 97110 THERAPEUTIC EXERCISES: CPT

## 2018-04-04 NOTE — PROGRESS NOTES
PT DAILY TREATMENT NOTE     Patient Name: Fahad Dunn  Date:2018  : 1975  [x]  Patient  Verified  Payor: [facility] MEDICAID / Plan: [place] [employer] [employer] [employer] / Product Type: Managed Care Medicaid /    In time:9:45  Out time:10:44  Total Treatment Time (min): 59  Visit #: 4 of 8    Treatment Area: Neck pain [M54.2]  Back pain, thoracic [M54.6]    SUBJECTIVE  Pain Level (0-10 scale): 6  Any medication changes, allergies to medications, adverse drug reactions, diagnosis change, or new procedure performed?: [x] No    [] Yes (see summary sheet for update)  Subjective functional status/changes:   [] No changes reported  \"I still hurt. I don't understand why they don't go ahead and do surgery. \"    OBJECTIVE    Modality rationale: decrease inflammation and decrease pain to improve the patients ability to perform daily activities with decreased pain and symptom levels   Min Type Additional Details    [] Estim:  []Unatt       []IFC  []Premod                        []Other:  []w/ice   []w/heat  Position:  Location:    [] Estim: []Att    []TENS instruct  []NMES                    []Other:  []w/US   []w/ice   []w/heat  Position:  Location:    []  Traction: [] Cervical       []Lumbar                       [] Prone          []Supine                       []Intermittent   []Continuous Lbs:  [] before manual  [] after manual    []  Ultrasound: []Continuous   [] Pulsed                           []1MHz   []3MHz W/cm2:  Location:    []  Iontophoresis with dexamethasone         Location: [] Take home patch   [] In clinic   10 []  Ice     [x]  heat  []  Ice massage  []  Laser   []  Anodyne Position: supine  Location:neck and low back    []  Laser with stim  []  Other:  Position:  Location:    []  Vasopneumatic Device Pressure:       [] lo [] med [] hi   Temperature: [] lo [] med [] hi   [x] Skin assessment post-treatment:  [x]intact []redness- no adverse reaction    []redness  adverse reaction:       49 min Therapeutic Exercise:  [x] See flow sheet :   Rationale: increase ROM and increase strength to improve the patients ability to perform daily activities with decreased pain and symptom levels          With   [] TE   [] TA   [] neuro   [] other: Patient Education: [x] Review HEP    [] Progressed/Changed HEP based on:   [] positioning   [] body mechanics   [] transfers   [] heat/ice application    [] other:      Other Objective/Functional Measures:   Decreased low back pain with SB rollouts at end of session  Very fatigued with s/l hip abduction     Pain Level (0-10 scale) post treatment: 6    ASSESSMENT/Changes in Function: Pt tolerated exercises well with no reports of increased pain. Requires rest breaks between sets due to fatigue. Updated HEP to include clams and PPT today. Patient will continue to benefit from skilled PT services to modify and progress therapeutic interventions, address functional mobility deficits, address strength deficits, analyze and cue movement patterns and instruct in home and community integration to attain remaining goals. []  See Plan of Care  []  See progress note/recertification  []  See Discharge Summary         Progress towards goals / Updated goals:  Short Term Goals: To be accomplished in 2 weeks:                           1. Patient is attending therapy at least 2 x weekly and shows compliance with HEP to assure progress  Status at [facility]: HEP initiated, discussed need to attend therapy on regular basis, discussed NS policy  Current status: reports compliance with HEP, no questions      2. Reduction in pain to <or= 5/10 with ADL to allow increased ADL including daily ambulation  Status at [facility]: pain ranging 5-8/10, sedentary, taking several naps during the day  Current: no change, still somewhat sedentary lifestyle           Long Term Goals: To be accomplished in 4 weeks:                           1.  Improved FOTO score to >or= 41/100 to allow performance of housework including use of broom with moderate difficulty  Status at [facility]: FOTO LS 29/100      2. Improved FOTO score to >or=43/100 to allow looking up at a bird and placing a can onto a shelf at l=shoulder level with minimal to moderate difficulty  Status at [facility]:FOTO 32/100, quite a bit of difficulty with above tasks      3. Improved cardiovascular endurance to allow TM ambulation >or= 15 minutes without significant SOB  Status at [facility]:^MWT to be performed during 2nd visit      4. Functional spinal mobility and ability to reach past mid shin <or= 4\" FFD to allow functional reaching to floor level for housekeeping chores  Status at [facility]: FFD 11 inches      5.  Reduction in pain to <or= 3/10 at rest to get optimal rest at night and reduce overall fatigue  Status at [facility]: pain ranging 5-8/10       PLAN  [x]  Upgrade activities as tolerated     [x]  Continue plan of care  []  Update interventions per flow sheet       []  Discharge due to:_  []  Other:_      Daniel Barber 4/4/2018  9:50 AM    Future Appointments  Date Time Provider Fernanda Owens   4/4/2018 10:00 AM Daniel Barber [facility]   4/9/2018 9:30 AM Eula Gillis, PT [facility]   4/11/2018 9:30 AM Valencia Crouch PT [facility]   4/12/2018 10:00 AM Eula Gillis, PT [facility]   4/16/2018 11:00 AM Valencia Crouch PT [facility]   4/17/2018 9:00 AM Valencia Crouch PT [facility]   4/19/2018 9:30 AM Valencia Crouch PT [facility]   4/23/2018 11:00 AM Valencia Crouch PT [facility]   4/26/2018 11:00 AM Valencia Crouch PT [facility]   4/27/2018 9:00 AM Valencia Crouch PT [facility]

## 2018-04-09 ENCOUNTER — HOSPITAL ENCOUNTER (OUTPATIENT)
Dept: PHYSICAL THERAPY | Age: 43
Discharge: HOME | End: 2018-04-09
Payer: MEDICAID

## 2018-04-09 PROCEDURE — 97530 THERAPEUTIC ACTIVITIES: CPT

## 2018-04-09 PROCEDURE — 97110 THERAPEUTIC EXERCISES: CPT

## 2018-04-09 PROCEDURE — 97140 MANUAL THERAPY 1/> REGIONS: CPT

## 2018-04-10 NOTE — PROGRESS NOTES
PT DAILY TREATMENT NOTE     Patient Name: Dennise Cordova  Date:2018  : 1975  [x]  Patient  Verified  Payor: [facility] MEDICAID / Plan: [place] [employer] / Product Type: Managed Care Medicaid /    In time:840  Out time:955  Total Treatment Time (min):75   Visit #: 4 of 8    Treatment Area: Neck pain [M54.2]  Back pain, thoracic [M54.6]    SUBJECTIVE  Pain Level (0-10 scale): 8  Any medication changes, allergies to medications, adverse drug reactions, diagnosis change, or new procedure performed?: [x] No    [] Yes (see summary sheet for update)  Subjective functional status/changes:   [] No changes reported  \"My whole body hurts\"    OBJECTIVE    Modality rationale: decrease inflammation and decrease pain to improve the patients ability to perform daily activities with decreased pain and symptom levels   Min Type Additional Details    [] Estim:  []Unatt       []IFC  []Premod                        []Other:  []w/ice   []w/heat  Position:  Location:    [] Estim: []Att    []TENS instruct  []NMES                    []Other:  []w/US   []w/ice   []w/heat  Position:  Location:    []  Traction: [] Cervical       []Lumbar                       [] Prone          []Supine                       []Intermittent   []Continuous Lbs:  [] before manual  [] after manual    []  Ultrasound: []Continuous   [] Pulsed                           []1MHz   []3MHz W/cm2:  Location:    []  Iontophoresis with dexamethasone         Location: [] Take home patch   [] In clinic   5 []  Ice     [x]  heat  []  Ice massage  []  Laser   []  Anodyne Position: prone  Location: low back    []  Laser with stim  []  Other:  Position:  Location:    []  Vasopneumatic Device Pressure:       [] lo [] med [] hi   Temperature: [] lo [] med [] hi   [x] Skin assessment post-treatment:  [x]intact []redness- no adverse reaction    []redness  adverse reaction:       35 min Therapeutic Exercise:  [x] See flow sheet :   Rationale: increase ROM and increase strength to improve the patients ability to perform daily activities with decreased pain and symptom levels    20 min Therapeutic Activity: initiated seated SB ex for improved LS mobility, core activation, postural alignment, discussed proper pelvic alignment to reduce sacral pain    15 min Manual Therapy: functional massage LS with focus on ES          With   [] TE   [] TA   [] neuro   [] other: Patient Education: [x] Review HEP    [] Progressed/Changed HEP based on:   [] positioning   [] body mechanics   [] transfers   [] heat/ice application    [] other:      Other Objective/Functional Measures:   Limited tolerance to TE  ES muscle spasm  Poor postural alignment servando CT junction     Pain Level (0-10 scale) post treatment: 7    ASSESSMENT/Changes in Function: Pt tolerated exercises well with no reports of increased pain. Requires rest breaks between sets due to fatigue. Updated HEP to include clams and PPT today. Patient will continue to benefit from skilled PT services to modify and progress therapeutic interventions, address functional mobility deficits, address strength deficits, analyze and cue movement patterns and instruct in home and community integration to attain remaining goals. [x]  See Plan of Care  []  See progress note/recertification  []  See Discharge Summary         Progress towards goals / Updated goals:  Short Term Goals: To be accomplished in 2 weeks:                           1. Patient is attending therapy at least 2 x weekly and shows compliance with HEP to assure progress  Status at [facility]: HEP initiated, discussed need to attend therapy on regular basis, discussed NS policy  Current status: reports compliance with HEP, no questions      2.  Reduction in pain to <or= 5/10 with ADL to allow increased ADL including daily ambulation  Status at [facility]: pain ranging 5-8/10, sedentary, taking several naps during the day  Current: no change, still somewhat sedentary lifestyle           Long Term Goals: To be accomplished in 4 weeks:                           1. Improved FOTO score to >or= 41/100 to allow performance of housework including use of broom with moderate difficulty  Status at [facility]: FOTO LS 29/100      2. Improved FOTO score to >or=43/100 to allow looking up at a bird and placing a can onto a shelf at l=shoulder level with minimal to moderate difficulty  Status at [facility]:FOTO 32/100, quite a bit of difficulty with above tasks      3. Improved cardiovascular endurance to allow TM ambulation >or= 15 minutes without significant SOB  Status at [facility]:^MWT to be performed during 2nd visit      4. Functional spinal mobility and ability to reach past mid shin <or= 4\" FFD to allow functional reaching to floor level for housekeeping chores  Status at [facility]: FFD 11 inches      5.  Reduction in pain to <or= 3/10 at rest to get optimal rest at night and reduce overall fatigue  Status at [facility]: pain ranging 5-8/10       PLAN  [x]  Upgrade activities as tolerated     [x]  Continue plan of care  []  Update interventions per flow sheet       []  Discharge due to:_  []  Other:_      Lucia Luna PT 4/9/2018  9:50 AM    Future Appointments  Date Time Provider Fernanda Owens   4/11/2018 9:30 AM SKYE Hadley THE [facility]   4/12/2018 10:00 AM SKYE Seaman THE [facility]   4/16/2018 11:00 AM SKYE HadleyHPMINOR THE [facility]   4/17/2018 9:00 AM SKYE HadleyHPMINOR THE [facility]   4/19/2018 9:30 AM SKYE HadleyHPMINOR THE [facility]   4/23/2018 11:00 AM SKYE HadleyHPMINOR THE [facility]   4/26/2018 11:00 AM SKYE HadleyHPMINOR THE [facility]   4/27/2018 9:00 AM SKYE HadleyHPMINOR THE [facility]

## 2018-04-11 ENCOUNTER — HOSPITAL ENCOUNTER (OUTPATIENT)
Dept: PHYSICAL THERAPY | Age: 43
Discharge: HOME | End: 2018-04-11
Payer: MEDICAID

## 2018-04-11 PROCEDURE — 97140 MANUAL THERAPY 1/> REGIONS: CPT

## 2018-04-11 PROCEDURE — 97110 THERAPEUTIC EXERCISES: CPT

## 2018-04-11 PROCEDURE — 97014 ELECTRIC STIMULATION THERAPY: CPT

## 2018-04-11 NOTE — PROGRESS NOTES
PT DAILY TREATMENT NOTE     Patient Name: Nely Gomez  Date:2018  : 1975  [x]  Patient  Verified  Payor: [facility] MEDICAID / Plan: [place] [employer] [employer] / Product Type: Managed Care Medicaid /    In time:930  Out time:1025  Total Treatment Time (min):55   Visit #: 6 of 8    Treatment Area: Neck pain [M54.2]  Back pain, thoracic [M54.6]    SUBJECTIVE  Pain Level (0-10 scale): LS 8, CS4  Any medication changes, allergies to medications, adverse drug reactions, diagnosis change, or new procedure performed?: [x] No    [] Yes (see summary sheet for update)  Subjective functional status/changes:   [] No changes reported  \"I am hurting\"    OBJECTIVE    Modality rationale: decrease inflammation and decrease pain to improve the patients ability to perform daily activities with decreased pain and symptom levels   Min Type Additional Details   15 [x] Estim:  [x]Unatt       [x]IFC  []Premod                        []Other:  []w/ice   [x]w/heat  Position:90/90  Location:CS    [] Estim: []Att    []TENS instruct  []NMES                    []Other:  []w/US   []w/ice   []w/heat  Position:  Location:    []  Traction: [] Cervical       []Lumbar                       [] Prone          []Supine                       []Intermittent   []Continuous Lbs:  [] before manual  [] after manual    []  Ultrasound: []Continuous   [] Pulsed                           []1MHz   []3MHz W/cm2:  Location:    []  Iontophoresis with dexamethasone         Location: [] Take home patch   [] In clinic    []  Ice     [x]  heat  []  Ice massage  []  Laser   []  Anodyne Position: prone  Location: low back    []  Laser with stim  []  Other:  Position:  Location:    []  Vasopneumatic Device Pressure:       [] lo [] med [] hi   Temperature: [] lo [] med [] hi   [x] Skin assessment post-treatment:  [x]intact []redness- no adverse reaction    []redness  adverse reaction:       30 min Therapeutic Exercise:  [x] See flow sheet :postural alignment, initiated biofeedback for LS and CS   Rationale: increase ROM and increase strength to improve the patients ability to perform daily activities with decreased pain and symptom levels        10 min Manual Therapy: functional massage CS with focus on alignment, SO release, anterior chest          With   [] TE   [] TA   [] neuro   [] other: Patient Education: [x] Review HEP    [] Progressed/Changed HEP based on:   [] positioning   [] body mechanics   [] transfers   [] heat/ice application    [] other:      Other Objective/Functional Measures:   No c/o increase in pain  TTP anterior chest    Pain Level (0-10 scale) post treatment:CS 6, LS 5     ASSESSMENT/Changes in Function: Pt tolerated exercises well with no reports of increased pain. Requires rest breaks between sets due to fatigue. Updated HEP to include clams and PPT today. Patient will continue to benefit from skilled PT services to modify and progress therapeutic interventions, address functional mobility deficits, address strength deficits, analyze and cue movement patterns and instruct in home and community integration to attain remaining goals. [x]  See Plan of Care  []  See progress note/recertification  []  See Discharge Summary         Progress towards goals / Updated goals:  Short Term Goals: To be accomplished in 2 weeks:                           1. Patient is attending therapy at least 2 x weekly and shows compliance with HEP to assure progress  Status at [facility]: HEP initiated, discussed need to attend therapy on regular basis, discussed NS policy  Current status: reports compliance with HEP, no questions      2. Reduction in pain to <or= 5/10 with ADL to allow increased ADL including daily ambulation  Status at [facility]: pain ranging 5-8/10, sedentary, taking several naps during the day  Current: no change, still somewhat sedentary lifestyle           Long Term Goals: To be accomplished in 4 weeks:                           1.  Improved FOTO score to >or= 41/100 to allow performance of housework including use of broom with moderate difficulty  Status at [facility]: FOTO LS 29/100      2. Improved FOTO score to >or=43/100 to allow looking up at a bird and placing a can onto a shelf at l=shoulder level with minimal to moderate difficulty  Status at [facility]:FOTO 32/100, quite a bit of difficulty with above tasks      3. Improved cardiovascular endurance to allow TM ambulation >or= 15 minutes without significant SOB  Status at [facility]:^MWT to be performed during 2nd visit      4. Functional spinal mobility and ability to reach past mid shin <or= 4\" FFD to allow functional reaching to floor level for housekeeping chores  Status at [facility]: FFD 11 inches      5.  Reduction in pain to <or= 3/10 at rest to get optimal rest at night and reduce overall fatigue  Status at [facility]: pain ranging 5-8/10       PLAN  [x]  Upgrade activities as tolerated     [x]  Continue plan of care  []  Update interventions per flow sheet       []  Discharge due to:_  []  Other:_      Licha Flores PT 4/11/2018  9:50 AM    Future Appointments  Date Time Provider Fernanda Owens   4/12/2018 10:00 AM Licha Flores PT MIHPLORELEID [facility]   4/16/2018 11:00 AM Eula gomes, PT [facility]   4/17/2018 9:00 AM SKYE AtkinsHPMINOR THE [facility]   4/19/2018 9:30 AM Licha Flores PT [facility]   4/23/2018 11:00 AM Licha Flores PT MIHPMINOR THE [facility]   4/26/2018 11:00 AM Licha Flores PT [facility]   4/27/2018 9:00 AM Licha Flores PT [facility] THE [facility]

## 2018-04-12 ENCOUNTER — APPOINTMENT (OUTPATIENT)
Dept: PHYSICAL THERAPY | Age: 43
End: 2018-04-12
Payer: MEDICAID

## 2018-04-16 ENCOUNTER — HOSPITAL ENCOUNTER (OUTPATIENT)
Dept: PHYSICAL THERAPY | Age: 43
Discharge: HOME | End: 2018-04-16
Payer: MEDICAID

## 2018-04-16 PROCEDURE — 97140 MANUAL THERAPY 1/> REGIONS: CPT

## 2018-04-16 PROCEDURE — 97110 THERAPEUTIC EXERCISES: CPT

## 2018-04-16 NOTE — PROGRESS NOTES
PT DAILY TREATMENT NOTE     Patient Name: Nely Gomez  Date:2018  : 1975  [x]  Patient  Verified  Payor: [facility] MEDICAID / Plan: [place] [employer] / Product Type: Managed Care Medicaid /    In time:1055  Out time:12  Total Treatment Time (min):65   Visit #: 7 of 8    Treatment Area: Neck pain [M54.2]  Back pain, thoracic [M54.6]    SUBJECTIVE  Pain Level (0-10 scale): LS 5, CS 5  Any medication changes, allergies to medications, adverse drug reactions, diagnosis change, or new procedure performed?: [x] No    [] Yes (see summary sheet for update)  Subjective functional status/changes:   [] No changes reported  \"My stomach is hurting from my colitis. I have been constipated. \"    OBJECTIVE    Modality rationale: decrease inflammation and decrease pain to improve the patients ability to perform daily activities with decreased pain and symptom levels   Min Type Additional Details    [x] Estim:  [x]Unatt       [x]IFC  []Premod                        []Other:  []w/ice   [x]w/heat  Position:90/90  Location:CS    [] Estim: []Att    []TENS instruct  []NMES                    []Other:  []w/US   []w/ice   []w/heat  Position:  Location:    []  Traction: [] Cervical       []Lumbar                       [] Prone          []Supine                       []Intermittent   []Continuous Lbs:  [] before manual  [] after manual    []  Ultrasound: []Continuous   [] Pulsed                           []1MHz   []3MHz W/cm2:  Location:    []  Iontophoresis with dexamethasone         Location: [] Take home patch   [] In clinic   10 []  Ice     [x]  heat  []  Ice massage  []  Laser   []  Anodyne Position: supine 90/90  Location: low back/CS    []  Laser with stim  []  Other:  Position:  Location:    []  Vasopneumatic Device Pressure:       [] lo [] med [] hi   Temperature: [] lo [] med [] hi   [x] Skin assessment post-treatment:  [x]intact []redness- no adverse reaction    []redness  adverse reaction:       45 min Therapeutic Exercise:  [x] See flow sheet :postural alignment, core activities   Rationale: increase ROM and increase strength to improve the patients ability to perform daily activities with decreased pain and symptom levels        10 min Manual Therapy: functional massage CS with focus on alignment, SO release, anterior chest          With   [] TE   [] TA   [] neuro   [] other: Patient Education: [x] Review HEP    [] Progressed/Changed HEP based on:   [] positioning   [] body mechanics   [] transfers   [] heat/ice application    [] other:      Other Objective/Functional Measures:   Reports stomach ache but no LBP during TE  Ability to perform QP knee lift without significant difficulty  Mild lS discomfort during prone bilateral leg lift with active LS stabilization    Pain Level (0-10 scale) post treatment:CS 0, LS 7    ASSESSMENT/Changes in Function: Pt tolerated exercises well with no reports of increased pain. Requires rest breaks between sets due to fatigue. Updated HEP to include clams and PPT today. Overall patient is more motivated and eager to participate    Patient will continue to benefit from skilled PT services to modify and progress therapeutic interventions, address functional mobility deficits, address strength deficits, analyze and cue movement patterns and instruct in home and community integration to attain remaining goals. [x]  See Plan of Care  []  See progress note/recertification  []  See Discharge Summary         Progress towards goals / Updated goals:  Short Term Goals: To be accomplished in 2 weeks:                           1. Patient is attending therapy at least 2 x weekly and shows compliance with HEP to assure progress  Status at [facility]: HEP initiated, discussed need to attend therapy on regular basis, discussed NS policy  Current status: reports compliance with HEP, no questions      2.  Reduction in pain to <or= 5/10 with ADL to allow increased ADL including daily ambulation  Status at [facility]: pain ranging 5-8/10, sedentary, taking several naps during the day  Current: no change, still somewhat sedentary lifestyle           Long Term Goals: To be accomplished in 4 weeks:                           1. Improved FOTO score to >or= 41/100 (LS) to allow performance of housework including use of broom with moderate difficulty  Status at [facility]: FOTO LS 29/100  Current status: 28, regressed although patient reports overall improvement and less pain after TE      2. Improved FOTO score to >or=43/100 to allow looking up at a bird and placing a can onto a shelf at l=shoulder level with minimal to moderate difficulty  Status at [facility]:FOTO 32/100, quite a bit of difficulty with above tasks  Status at [facility]: 39, progressing      3. Improved cardiovascular endurance to allow TM ambulation >or= 15 minutes without significant SOB  Status at [facility]:^MWT to be performed during 2nd visit      4. Functional spinal mobility and ability to reach past mid shin <or= 4\" FFD to allow functional reaching to floor level for housekeeping chores  Status at [facility]: FFD 11 inches      5.  Reduction in pain to <or= 3/10 at rest to get optimal rest at night and reduce overall fatigue  Status at [facility]: pain ranging 5-8/10       PLAN  [x]  Upgrade activities as tolerated     [x]  Continue plan of care  []  Update interventions per flow sheet       []  Discharge due to:_  []  Other:_      Siddhartha Barajas PT 4/16/2018  9:50 AM    Future Appointments  Date Time Provider Fernanda Owens   4/17/2018 9:00 AM SKYE Tay THE [facility]   4/19/2018 9:30 AM SKYE Leyva THE [facility]   4/23/2018 11:00 AM SKYE Leyva THE [facility]   4/26/2018 11:00 AM SKYE Tay THE [facility]   4/27/2018 9:00 AM SKYE aTy THE [facility]   4/30/2018 8:30 AM Anthony FLAHERTYD THE [facility]   5/1/2018 11:00 AM Gi ALDANA [facility]   5/3/2018 9:00 AM [place] [facility]   5/7/2018 9:30 AM SKYE Leyva THE [facility] 5/8/2018 9:30 AM Austen PANDYAMINOR THE [facility]   5/10/2018 9:00 AM SKYE MeadeMINOR THE [facility]

## 2018-04-17 ENCOUNTER — APPOINTMENT (OUTPATIENT)
Dept: PHYSICAL THERAPY | Age: 43
End: 2018-04-17
Payer: MEDICAID

## 2018-04-18 ENCOUNTER — APPOINTMENT (OUTPATIENT)
Dept: PHYSICAL THERAPY | Age: 43
End: 2018-04-18
Payer: MEDICAID

## 2018-04-19 ENCOUNTER — APPOINTMENT (OUTPATIENT)
Dept: PHYSICAL THERAPY | Age: 43
End: 2018-04-19
Payer: MEDICAID

## 2018-04-23 ENCOUNTER — APPOINTMENT (OUTPATIENT)
Dept: PHYSICAL THERAPY | Age: 43
End: 2018-04-23
Payer: MEDICAID

## 2018-04-26 ENCOUNTER — APPOINTMENT (OUTPATIENT)
Dept: PHYSICAL THERAPY | Age: 43
End: 2018-04-26
Payer: MEDICAID

## 2018-04-27 ENCOUNTER — APPOINTMENT (OUTPATIENT)
Dept: PHYSICAL THERAPY | Age: 43
End: 2018-04-27
Payer: MEDICAID

## 2018-04-30 ENCOUNTER — APPOINTMENT (OUTPATIENT)
Dept: PHYSICAL THERAPY | Age: 43
End: 2018-04-30
Payer: MEDICAID

## 2018-05-01 ENCOUNTER — APPOINTMENT (OUTPATIENT)
Dept: PHYSICAL THERAPY | Age: 43
End: 2018-05-01

## 2018-05-03 ENCOUNTER — APPOINTMENT (OUTPATIENT)
Dept: PHYSICAL THERAPY | Age: 43
End: 2018-05-03

## 2018-05-07 ENCOUNTER — APPOINTMENT (OUTPATIENT)
Dept: PHYSICAL THERAPY | Age: 43
End: 2018-05-07

## 2018-05-08 ENCOUNTER — APPOINTMENT (OUTPATIENT)
Dept: PHYSICAL THERAPY | Age: 43
End: 2018-05-08

## 2018-05-10 ENCOUNTER — APPOINTMENT (OUTPATIENT)
Dept: PHYSICAL THERAPY | Age: 43
End: 2018-05-10

## 2018-07-27 NOTE — PROGRESS NOTES
Unable to further assess progress towards goals at this time due to non-compliance/lack of attendance. As of our most recent phone conversation with Mrs. John Carmen she is still having insurance issues. DC at this time with no further instructions to the patient.   Thank you for this referral.

## 2018-09-28 ENCOUNTER — HOSPITAL ENCOUNTER (EMERGENCY)
Age: 43
Discharge: HOME | End: 2018-09-28
Attending: EMERGENCY MEDICINE
Payer: MEDICAID

## 2018-09-28 VITALS
SYSTOLIC BLOOD PRESSURE: 120 MMHG | HEIGHT: 64 IN | DIASTOLIC BLOOD PRESSURE: 65 MMHG | BODY MASS INDEX: 35 KG/M2 | RESPIRATION RATE: 20 BRPM | WEIGHT: 205 LBS | TEMPERATURE: 97.7 F | OXYGEN SATURATION: 100 % | HEART RATE: 73 BPM

## 2018-09-28 DIAGNOSIS — M54.42 ACUTE MIDLINE LOW BACK PAIN WITH BILATERAL SCIATICA: Primary | ICD-10-CM

## 2018-09-28 DIAGNOSIS — M54.41 ACUTE MIDLINE LOW BACK PAIN WITH BILATERAL SCIATICA: Primary | ICD-10-CM

## 2018-09-28 LAB — HCG UR QL: NEGATIVE

## 2018-09-28 PROCEDURE — 99283 EMERGENCY DEPT VISIT LOW MDM: CPT

## 2018-09-28 PROCEDURE — 96372 THER/PROPH/DIAG INJ SC/IM: CPT

## 2018-09-28 PROCEDURE — 81025 URINE PREGNANCY TEST: CPT

## 2018-09-28 PROCEDURE — 74011250636 HC RX REV CODE- 250/636: Performed by: PHYSICIAN ASSISTANT

## 2018-09-28 RX ORDER — KETOROLAC TROMETHAMINE 30 MG/ML
60 INJECTION, SOLUTION INTRAMUSCULAR; INTRAVENOUS
Status: COMPLETED | OUTPATIENT
Start: 2018-09-28 | End: 2018-09-28

## 2018-09-28 RX ORDER — PREDNISONE 10 MG/1
TABLET ORAL
Qty: 21 TAB | Refills: 0 | Status: SHIPPED | OUTPATIENT
Start: 2018-09-28 | End: 2019-05-13

## 2018-09-28 RX ADMIN — KETOROLAC TROMETHAMINE 60 MG: 30 INJECTION, SOLUTION INTRAMUSCULAR at 12:40

## 2018-09-28 RX ADMIN — METHYLPREDNISOLONE SODIUM SUCCINATE 125 MG: 125 INJECTION, POWDER, FOR SOLUTION INTRAMUSCULAR; INTRAVENOUS at 12:38

## 2018-09-28 NOTE — ED PROVIDER NOTES
EMERGENCY DEPARTMENT HISTORY AND PHYSICAL EXAM 
 
Date: 9/28/2018 Patient Name: Santino Dawson History of Presenting Illness Chief Complaint Patient presents with  Back Pain History Provided By: Patient Chief Complaint: Lower back pain Duration: 3 Days Timing:  Acute Location: Lower back radiating down bilateral legs Quality: Steward Reas Severity: 8 out of 10 Modifying Factors: No alleviation with Naproxen and muscle relaxers. Associated Symptoms: denies any other associated signs or symptoms Additional History (Context):  
11:54 AM 
Santino Dawson is a 37 y.o. female who presents to the emergency department C/O lower back pain (8/10) radiating down her bilateral legs onset 3 days ago while cleaning. No associated sxs. No alleviation with Naproxen and muscle relaxers. Pt reports she was mopping and when she went to  a rug she felt a sharp pain in her lower back. Reports similar sxs 1 year ago. States she has previously had alleviation with steroids. Reports Hx of lower back surgeries x 2. Pt denies fever, chills, new numbness/tigling to her BLE, urinary/fecal incontinence, and any other sxs or complaints. No fall PCP: Sarmad Roberto MD 
 
Current Facility-Administered Medications Medication Dose Route Frequency Provider Last Rate Last Dose  ketorolac tromethamine (TORADOL) 60 mg/2 mL injection 60 mg  60 mg IntraMUSCular NOW Avani Mancia PA-C      
 methylPREDNISolone (PF) (SOLU-MEDROL) injection 125 mg  125 mg IntraMUSCular NOW Avani Mancia PA-C Current Outpatient Prescriptions Medication Sig Dispense Refill  predniSONE (STERAPRED DS) 10 mg dose pack Use per pack instructions. 21 Tab 0  
 oxyCODONE-acetaminophen (PERCOCET) 5-325 mg per tablet Take 1 Tab by mouth every eight (8) hours as needed for Pain. Max Daily Amount: 3 Tabs. 6 Tab 0  
 amitriptyline (ELAVIL) 25 mg tablet TK 1 T PO BID  2  
 benztropine (COGENTIN) 0.5 mg tablet TK 1 T PO BID  2  clonazePAM (KLONOPIN) 1 mg tablet TK 1 TABLET PO TID PRN  2  
 OXcarbazepine (TRILEPTAL) 150 mg tablet TK 1 T PO BID  2  
 propranolol (INDERAL) 40 mg tablet TK 1 T PO BID  2  
 QUEtiapine (SEROQUEL) 25 mg tablet TK 1 T PO BID  2  Cholecalciferol, Vitamin D3, 50,000 unit cap TK 1 C PO TWICE A WEEK FOR LOW VITAMIN-D  6  
 naproxen sodium (ANAPROX) 550 mg tablet TK 1 T PO Q 12 H  5  
 tiZANidine (ZANAFLEX) 2 mg capsule TK 2 CS PO QID  6  
 baclofen (LIORESAL) 10 mg tablet TK 1 T PO TID  6  
 gabapentin (NEURONTIN) 300 mg capsule TK 1 C PO 4 TO 5 TIMES A DAY  5  
 methocarbamol (ROBAXIN) 500 mg tablet TK 1 T PO BID PRN  6  
 PENTASA 500 mg CR capsule TK 3 CS PO BID  11  
 methocarbamol (ROBAXIN-750) 750 mg tablet Take 750 mg by mouth four (4) times daily.  amitriptyline (ELAVIL) 75 mg tablet Take  by mouth nightly.  BACLOFEN PO Take  by mouth.  CHOLECALCIFEROL, VITAMIN D3, (VITAMIN D3 PO) Take  by mouth.  PROPRANOLOL HCL (PROPRANOLOL PO) Take  by mouth.  TIZANIDINE HCL (ZANAFLEX PO) Take  by mouth.  clonazePAM (KLONOPIN) 0.5 mg tablet Take 0.5 mg by mouth two (2) times a day. Past History Past Medical History: 
Past Medical History:  
Diagnosis Date  Anxiety  Arrhythmia  Chronic pain  Diabetes ([facility] [address]  Dysuria  Gastrointestinal disorder   
 colitis  Gross hematuria  Heart murmur  Hematuria  HPV (human papilloma virus) infection  Hypertension  Hypothyroidism  Ischemic colitis ([address]  MS (multiple sclerosis) ([facility] [address]  OCD (obsessive compulsive disorder)  Other ill-defined conditions(799.89)   
 prescription drug addiction  Pain management  Psychiatric disorder   
 depression, bipolar anxiety, ocd  Rapid heart rate  Seizures ([facility] [address]  Tattoo Tattoos  Vitamin D deficiency  Vulvar pain Past Surgical History: 
Past Surgical History:  
Procedure Laterality Date  HX ORTHOPAEDIC    
 back surgery, ulnar nerve  HX ORTHOPAEDIC    
 back surgery x 2  
 HX OTHER SURGICAL    
 ulner nerve surgery  HX TUBAL LIGATION Family History: 
Family History Problem Relation Age of Onset  Diabetes Mother  Cancer Maternal Grandmother  Breast Cancer Maternal Aunt  Thyroid Disease Maternal Aunt Social History: 
Social History Substance Use Topics  Smoking status: Current Every Day Smoker Packs/day: 1.50 Types: Cigarettes Last attempt to quit: 7/10/2017  Smokeless tobacco: Never Used Comment: pt stated she quit 2 weeks ago  Alcohol use No  
   Comment: occasionally Allergies: Allergies Allergen Reactions  Ciprofloxacin Other (comments) Can't tolerated, makes nauseous  Sulfatrim Ds Myalgia  Sulfa (Sulfonamide Antibiotics) Other (comments)  Sulfa (Sulfonamide Antibiotics) Myalgia  Penicillins Rash Review of Systems Review of Systems Constitutional: Negative for chills and fever. HENT: Negative for congestion, rhinorrhea and sore throat. Eyes: Negative for visual disturbance. Respiratory: Negative for cough and shortness of breath. Cardiovascular: Negative for chest pain and palpitations. Gastrointestinal: Negative for abdominal pain, nausea and vomiting.  
     (-) Fecal incontinence Genitourinary:  
     (-) Urinary incontinence Musculoskeletal: Positive for back pain (lower) and myalgias (BLE radiating from back). Skin: Negative. Allergic/Immunologic: Negative. Neurological: Negative for headaches. Physical Exam  
 
Vitals:  
 09/28/18 1051 BP: 120/65 Pulse: 73 Resp: 20 Temp: 97.7 °F (36.5 °C) SpO2: 100% Weight: 93 kg (205 lb) Height: 5' 4\" (1.626 m) Physical Exam  
Constitutional: She is oriented to person, place, and time. Vital signs are normal. She appears well-developed and well-nourished.  She is active and cooperative. Non-toxic appearance. No distress. Pt ambulating without assistance HENT:  
Head: Normocephalic and atraumatic. Right Ear: External ear normal.  
Left Ear: External ear normal.  
Nose: Nose normal.  
Mouth/Throat: Mucous membranes are normal.  
Eyes: Conjunctivae, EOM and lids are normal. No scleral icterus. Neck: Normal range of motion. Neck supple. Cardiovascular: Normal rate, regular rhythm and normal heart sounds. Pulses: 
     Dorsalis pedis pulses are 2+ on the right side, and 2+ on the left side. Pulmonary/Chest: Effort normal and breath sounds normal. No respiratory distress. Abdominal: Soft. Normal appearance. There is no tenderness. There is no rebound. Musculoskeletal: She exhibits no edema. Lumbar back: She exhibits decreased range of motion and tenderness. She exhibits no swelling, no edema, no deformity and no spasm. Back: 
 
Neurological: She is alert and oriented to person, place, and time. She exhibits normal muscle tone. Skin: Skin is warm and intact. No rash noted. Psychiatric: She has a normal mood and affect. Her speech is normal and behavior is normal. Judgment and thought content normal.  
Nursing note and vitals reviewed. Diagnostic Study Results Labs - No results found for this or any previous visit (from the past 12 hour(s)). Radiologic Studies - No orders to display CT Results  (Last 48 hours) None CXR Results  (Last 48 hours) None Medications given in the ED- Medications  
ketorolac tromethamine (TORADOL) 60 mg/2 mL injection 60 mg (not administered) methylPREDNISolone (PF) (SOLU-MEDROL) injection 125 mg (not administered) Medical Decision Making I am the first provider for this patient. I reviewed the vital signs, available nursing notes, past medical history, past surgical history, family history and social history. Vital Signs-Reviewed the patient's vital signs. Pulse Oximetry Analysis - 100% on RA Records Reviewed: Nursing Notes and Old Medical Records Provider Notes (Medical Decision Making): Chronic back pain, worsened after cleaning 3 days ago, no neuro deficits, no hx of trauma/fall. Reports improved pain in past with steroids. Check UCG, give NSAIDs and steroid. Procedures: 
Procedures ED Course:  
11:54 AM Initial assessment performed. The patients presenting problems have been discussed, and they are in agreement with the care plan formulated and outlined with them. I have encouraged them to ask questions as they arise throughout their visit. Diagnosis and Disposition DISCHARGE NOTE: 
12:03 PM 
Maile Alonso's  results have been reviewed with her. She has been counseled regarding her diagnosis, treatment, and plan. She verbally conveys understanding and agreement of the signs, symptoms, diagnosis, treatment and prognosis and additionally agrees to follow up as discussed. She also agrees with the care-plan and conveys that all of her questions have been answered. I have also provided discharge instructions for her that include: educational information regarding their diagnosis and treatment, and list of reasons why they would want to return to the ED prior to their follow-up appointment, should her condition change. She has been provided with education for proper emergency department utilization. CLINICAL IMPRESSION: 
 
1. Acute midline low back pain with bilateral sciatica PLAN: 
1. D/C Home 2. Current Discharge Medication List  
  
CONTINUE these medications which have CHANGED Details  
predniSONE (STERAPRED DS) 10 mg dose pack Use per pack instructions. Qty: 21 Tab, Refills: 0  
  
  
 
3. Follow-up Information Follow up With Details Comments Contact Info Cesar Fabian MD Schedule an appointment as soon as possible for a visit in 3 days For primary care follow up. [address] [facility] [address] [facility] 
[phone] Maria Guadalupe Call MD Schedule an appointment as soon as possible for a visit in 3 days For orthopedic follow up. [address] [place] [address] [address] [facility] 
[phone] [facility] EMERGENCY DEPT Go to As needed, If symptoms worsen [address] 
[address] [address] 
[phone]  
  
 
_______________________________ Attestations: This note is prepared by Cesar Ang, acting as Scribe for [facility] ROGERIO Ruano. Avani Mancia PA-C:  The scribe's documentation has been prepared under my direction and personally reviewed by me in its entirety. I confirm that the note above accurately reflects all work, treatment, procedures, and medical decision making performed by me. 
_______________________________

## 2018-09-28 NOTE — DISCHARGE INSTRUCTIONS
Learning About Relief for Back Pain  What is back tension and strain? Back strain happens when you overstretch, or pull, a muscle in your back. You may hurt your back in an accident or when you exercise or lift something. Most back pain will get better with rest and time. You can take care of yourself at home to help your back heal.  What can you do first to relieve back pain? When you first feel back pain, try these steps:  · Walk. Take a short walk (10 to 20 minutes) on a level surface (no slopes, hills, or stairs) every 2 to 3 hours. Walk only distances you can manage without pain, especially leg pain. · Relax. Find a comfortable position for rest. Some people are comfortable on the floor or a medium-firm bed with a small pillow under their head and another under their knees. Some people prefer to lie on their side with a pillow between their knees. Don't stay in one position for too long. · Try heat or ice. Try using a heating pad on a low or medium setting, or take a warm shower, for 15 to 20 minutes every 2 to 3 hours. Or you can buy single-use heat wraps that last up to 8 hours. You can also try an ice pack for 10 to 15 minutes every 2 to 3 hours. You can use an ice pack or a bag of frozen vegetables wrapped in a thin towel. There is not strong evidence that either heat or ice will help, but you can try them to see if they help. You may also want to try switching between heat and cold. · Take pain medicine exactly as directed. ¨ If the doctor gave you a prescription medicine for pain, take it as prescribed. ¨ If you are not taking a prescription pain medicine, ask your doctor if you can take an over-the-counter medicine. What else can you do? · Stretch and exercise. Exercises that increase flexibility may relieve your pain and make it easier for your muscles to keep your spine in a good, neutral position. And don't forget to keep walking. · Do self-massage.  You can use self-massage to unwind after work or school or to energize yourself in the morning. You can easily massage your feet, hands, or neck. Self-massage works best if you are in comfortable clothes and are sitting or lying in a comfortable position. Use oil or lotion to massage bare skin. · Reduce stress. Back pain can lead to a vicious Venetie: Distress about the pain tenses the muscles in your back, which in turn causes more pain. Learn how to relax your mind and your muscles to lower your stress. Where can you learn more? Go to http://lashay-marty.info/. Enter S316 in the search box to learn more about \"Learning About Relief for Back Pain. \"  Current as of: March 21, 2017  Content Version: 11.5  © 9972-8260 [employer], [employer]. Care instructions adapted under license by [employer] (which disclaims liability or warranty for this information). If you have questions about a medical condition or this instruction, always ask your healthcare professional. [place] [address] any warranty or liability for your use of this information.

## 2019-05-13 ENCOUNTER — APPOINTMENT (OUTPATIENT)
Dept: CT IMAGING | Age: 44
End: 2019-05-13
Attending: EMERGENCY MEDICINE
Payer: MEDICAID

## 2019-05-13 ENCOUNTER — HOSPITAL ENCOUNTER (EMERGENCY)
Age: 44
Discharge: HOME | End: 2019-05-13
Attending: EMERGENCY MEDICINE
Payer: MEDICAID

## 2019-05-13 ENCOUNTER — APPOINTMENT (OUTPATIENT)
Dept: GENERAL RADIOLOGY | Age: 44
End: 2019-05-13
Attending: EMERGENCY MEDICINE
Payer: MEDICAID

## 2019-05-13 VITALS
HEART RATE: 78 BPM | TEMPERATURE: 97.6 F | DIASTOLIC BLOOD PRESSURE: 68 MMHG | SYSTOLIC BLOOD PRESSURE: 119 MMHG | OXYGEN SATURATION: 100 % | RESPIRATION RATE: 13 BRPM | HEIGHT: 64 IN | WEIGHT: 200 LBS | BODY MASS INDEX: 34.15 KG/M2

## 2019-05-13 DIAGNOSIS — Z98.890 S/P ENDOSCOPY: ICD-10-CM

## 2019-05-13 DIAGNOSIS — R10.13 ABDOMINAL PAIN, EPIGASTRIC: Primary | ICD-10-CM

## 2019-05-13 LAB
ALBUMIN SERPL-MCNC: 4.1 G/DL (ref 3.4–5)
ALBUMIN/GLOB SERPL: 1.3 {RATIO} (ref 0.8–1.7)
ALP SERPL-CCNC: 110 U/L (ref 45–117)
ALT SERPL-CCNC: 24 U/L (ref 13–56)
ANION GAP SERPL CALC-SCNC: 6 MMOL/L (ref 3–18)
APPEARANCE UR: CLEAR
AST SERPL-CCNC: 10 U/L (ref 15–37)
ATRIAL RATE: 78 BPM
BASOPHILS # BLD: 0 K/UL (ref 0–0.1)
BASOPHILS NFR BLD: 0 % (ref 0–2)
BILIRUB SERPL-MCNC: 0.6 MG/DL (ref 0.2–1)
BILIRUB UR QL: NEGATIVE
BUN SERPL-MCNC: 7 MG/DL (ref 7–18)
BUN/CREAT SERPL: 6 (ref 12–20)
CALCIUM SERPL-MCNC: 9.3 MG/DL (ref 8.5–10.1)
CALCULATED P AXIS, ECG09: 64 DEGREES
CALCULATED R AXIS, ECG10: 11 DEGREES
CALCULATED T AXIS, ECG11: 47 DEGREES
CHLORIDE SERPL-SCNC: 104 MMOL/L (ref 100–108)
CO2 SERPL-SCNC: 30 MMOL/L (ref 21–32)
COLOR UR: YELLOW
CREAT SERPL-MCNC: 1.23 MG/DL (ref 0.6–1.3)
DIAGNOSIS, 93000: NORMAL
DIFFERENTIAL METHOD BLD: NORMAL
EOSINOPHIL # BLD: 0.3 K/UL (ref 0–0.4)
EOSINOPHIL NFR BLD: 2 % (ref 0–5)
ERYTHROCYTE [DISTWIDTH] IN BLOOD BY AUTOMATED COUNT: 12.8 % (ref 11.6–14.5)
GLOBULIN SER CALC-MCNC: 3.2 G/DL (ref 2–4)
GLUCOSE SERPL-MCNC: 99 MG/DL (ref 74–99)
GLUCOSE UR STRIP.AUTO-MCNC: NEGATIVE MG/DL
HCG UR QL: NEGATIVE
HCT VFR BLD AUTO: 43.5 % (ref 35–45)
HGB BLD-MCNC: 14.6 G/DL (ref 12–16)
HGB UR QL STRIP: NEGATIVE
KETONES UR QL STRIP.AUTO: NEGATIVE MG/DL
LEUKOCYTE ESTERASE UR QL STRIP.AUTO: NEGATIVE
LIPASE SERPL-CCNC: 82 U/L (ref 73–393)
LYMPHOCYTES # BLD: 2.8 K/UL (ref 0.9–3.6)
LYMPHOCYTES NFR BLD: 24 % (ref 21–52)
MAGNESIUM SERPL-MCNC: 2.1 MG/DL (ref 1.6–2.6)
MCH RBC QN AUTO: 28.1 PG (ref 24–34)
MCHC RBC AUTO-ENTMCNC: 33.6 G/DL (ref 31–37)
MCV RBC AUTO: 83.8 FL (ref 74–97)
MONOCYTES # BLD: 0.9 K/UL (ref 0.05–1.2)
MONOCYTES NFR BLD: 8 % (ref 3–10)
NEUTS SEG # BLD: 7.6 K/UL (ref 1.8–8)
NEUTS SEG NFR BLD: 66 % (ref 40–73)
NITRITE UR QL STRIP.AUTO: NEGATIVE
P-R INTERVAL, ECG05: 170 MS
PH UR STRIP: 6.5 [PH] (ref 5–8)
PLATELET # BLD AUTO: 344 K/UL (ref 135–420)
PMV BLD AUTO: 10.3 FL (ref 9.2–11.8)
POTASSIUM SERPL-SCNC: 3.7 MMOL/L (ref 3.5–5.5)
PROT SERPL-MCNC: 7.3 G/DL (ref 6.4–8.2)
PROT UR STRIP-MCNC: NEGATIVE MG/DL
Q-T INTERVAL, ECG07: 356 MS
QRS DURATION, ECG06: 86 MS
QTC CALCULATION (BEZET), ECG08: 405 MS
RBC # BLD AUTO: 5.19 M/UL (ref 4.2–5.3)
SODIUM SERPL-SCNC: 140 MMOL/L (ref 136–145)
SP GR UR REFRACTOMETRY: 1.01 (ref 1–1.03)
UROBILINOGEN UR QL STRIP.AUTO: 0.2 EU/DL (ref 0.2–1)
VENTRICULAR RATE, ECG03: 78 BPM
WBC # BLD AUTO: 11.7 K/UL (ref 4.6–13.2)

## 2019-05-13 PROCEDURE — 96361 HYDRATE IV INFUSION ADD-ON: CPT

## 2019-05-13 PROCEDURE — 74011250636 HC RX REV CODE- 250/636: Performed by: EMERGENCY MEDICINE

## 2019-05-13 PROCEDURE — 80053 COMPREHEN METABOLIC PANEL: CPT

## 2019-05-13 PROCEDURE — 74177 CT ABD & PELVIS W/CONTRAST: CPT

## 2019-05-13 PROCEDURE — 81025 URINE PREGNANCY TEST: CPT

## 2019-05-13 PROCEDURE — 71045 X-RAY EXAM CHEST 1 VIEW: CPT

## 2019-05-13 PROCEDURE — 83690 ASSAY OF LIPASE: CPT

## 2019-05-13 PROCEDURE — 85025 COMPLETE CBC W/AUTO DIFF WBC: CPT

## 2019-05-13 PROCEDURE — 96374 THER/PROPH/DIAG INJ IV PUSH: CPT

## 2019-05-13 PROCEDURE — 74011636320 HC RX REV CODE- 636/320: Performed by: EMERGENCY MEDICINE

## 2019-05-13 PROCEDURE — 99284 EMERGENCY DEPT VISIT MOD MDM: CPT

## 2019-05-13 PROCEDURE — 93005 ELECTROCARDIOGRAM TRACING: CPT

## 2019-05-13 PROCEDURE — 83735 ASSAY OF MAGNESIUM: CPT

## 2019-05-13 PROCEDURE — 81003 URINALYSIS AUTO W/O SCOPE: CPT

## 2019-05-13 RX ORDER — PANTOPRAZOLE SODIUM 40 MG/10ML
40 INJECTION, POWDER, LYOPHILIZED, FOR SOLUTION INTRAVENOUS
Status: DISCONTINUED | OUTPATIENT
Start: 2019-05-13 | End: 2019-05-13 | Stop reason: HOSPADM

## 2019-05-13 RX ORDER — ONDANSETRON 2 MG/ML
4 INJECTION INTRAMUSCULAR; INTRAVENOUS
Status: COMPLETED | OUTPATIENT
Start: 2019-05-13 | End: 2019-05-13

## 2019-05-13 RX ADMIN — ONDANSETRON 4 MG: 2 INJECTION INTRAMUSCULAR; INTRAVENOUS at 17:07

## 2019-05-13 RX ADMIN — IOPAMIDOL 100 ML: 612 INJECTION, SOLUTION INTRAVENOUS at 16:44

## 2019-05-13 RX ADMIN — SODIUM CHLORIDE 1000 ML: 900 INJECTION, SOLUTION INTRAVENOUS at 15:29

## 2019-05-13 NOTE — ED PROVIDER NOTES
EMERGENCY DEPARTMENT HISTORY AND PHYSICAL EXAM 
 
Date: 5/13/2019 Patient Name: Yanely Cazares History of Presenting Illness Chief Complaint Patient presents with  Abdominal Pain History Provided By: Patient Additional History (Context): Yanely Cazares is a 40 y.o. female with PMHX of chronic dysphasia, odynophagiapresents to the emergency department C/O epigastric pain status post endoscopy with  3 days prior. Patient reports that since the endoscopy, patient's pain has worsened. She states that she is having difficulty in eating however denies active vomiting. Patient does report nausea. Patient states that her last bowel movement was 2 to 3 days ago. Denies any prior abdominal surgeries or small bowel obstructions. . Pt denies fever, dysuria, hematuria, chest pain, shortness of breath, and any other sxs or complaints. Patient does report a history of ischemic colitis and colitis. PCP: Monica Gonzalez MD 
 
Current Facility-Administered Medications Medication Dose Route Frequency Provider Last Rate Last Dose  pantoprazole (PROTONIX) injection 40 mg  40 mg IntraVENous NOW Pasquale Monroy DO      
 
Current Outpatient Medications Medication Sig Dispense Refill  amitriptyline (ELAVIL) 25 mg tablet TK 1 T PO BID  2  
 benztropine (COGENTIN) 0.5 mg tablet TK 1 T PO BID  2  
 OXcarbazepine (TRILEPTAL) 150 mg tablet TK 1 T PO BID  2  
 propranolol (INDERAL) 40 mg tablet TK 1 T PO BID  2  
 QUEtiapine (SEROQUEL) 25 mg tablet TK 1 T PO BID  2  
 naproxen sodium (ANAPROX) 550 mg tablet TK 1 T PO Q 12 H  5  
 tiZANidine (ZANAFLEX) 2 mg capsule TK 2 CS PO QID  6  
 gabapentin (NEURONTIN) 300 mg capsule TK 1 C PO 4 TO 5 TIMES A DAY  5  
 PENTASA 500 mg CR capsule TK 3 CS PO BID  11  
 CHOLECALCIFEROL, VITAMIN D3, (VITAMIN D3 PO) Take  by mouth.  clonazePAM (KLONOPIN) 0.5 mg tablet Take 0.5 mg by mouth two (2) times a day.  amitriptyline (ELAVIL) 75 mg tablet Take  by mouth nightly. Past History Past Medical History: 
Past Medical History:  
Diagnosis Date  Anxiety  Arrhythmia  Chronic pain  Diabetes ([facility] [address]  Dysuria  Gastrointestinal disorder   
 colitis  Gross hematuria  Heart murmur  Hematuria  HPV (human papilloma virus) infection  Hypertension  Hypothyroidism  Ischemic colitis ([facility] [address]  [facility]  OCD (obsessive compulsive disorder)  Other ill-defined conditions(799.89)   
 prescription drug addiction  Pain management  Psychiatric disorder   
 depression, bipolar anxiety, ocd  Rapid heart rate  Seizures [facility]  Tattoo Tattoos  Vitamin D deficiency  Vulvar pain Past Surgical History: 
Past Surgical History:  
Procedure Laterality Date  HX ORTHOPAEDIC    
 back surgery, ulnar nerve  HX ORTHOPAEDIC    
 back surgery x 2  
 HX OTHER SURGICAL    
 ulner nerve surgery  HX TUBAL LIGATION Family History: 
Family History Problem Relation Age of Onset  Diabetes Mother  Cancer Maternal Grandmother  Breast Cancer Maternal Aunt  Thyroid Disease Maternal Aunt Social History: 
Social History Tobacco Use  Smoking status: Current Every Day Smoker Packs/day: 1.50 Types: Cigarettes Last attempt to quit: 7/10/2017 Years since quittin.8  Smokeless tobacco: Never Used  Tobacco comment: pt stated she quit 2 weeks ago Substance Use Topics  Alcohol use: No  
  Comment: occasionally  Drug use: No  
 
 
Allergies: Allergies Allergen Reactions  Ciprofloxacin Other (comments) Can't tolerated, makes nauseous  Sulfatrim Ds Myalgia  Sulfa (Sulfonamide Antibiotics) Other (comments)  Sulfa (Sulfonamide Antibiotics) Myalgia  Penicillins Rash Review of Systems Review of Systems Constitutional: Negative for chills and fever. HENT: Negative for congestion, ear pain, sinus pain and sore throat. Eyes: Negative for pain and visual disturbance. Respiratory: Negative for cough and shortness of breath. Cardiovascular: Negative for chest pain and leg swelling. Gastrointestinal: Positive for abdominal pain and nausea. Negative for constipation, diarrhea and vomiting. Genitourinary: Negative for dysuria, hematuria, vaginal bleeding and vaginal discharge. Musculoskeletal: Negative for back pain and neck pain. Skin: Negative for rash and wound. Neurological: Negative for dizziness, tremors, weakness, light-headedness and numbness. All other systems reviewed and are negative. Physical Exam  
 
Vitals:  
 05/13/19 1422 05/13/19 1702 05/13/19 1708 BP: 109/79 121/64 Pulse: 88 84 78 Resp: 20 15 13 Temp: 97.6 °F (36.4 °C) SpO2: 100%  100% Weight: 90.7 kg (200 lb) Height: 5' 4\" (1.626 m) Physical Exam 
 
Nursing note and vitals reviewed Constitutional: [de-identified]  female, no acute distress Head: Normocephalic, Atraumatic Eyes: Pupils are equal, round, and reactive to light, EOMI Neck: Supple, non-tender Cardiovascular: Regular rate and rhythm, no murmurs, rubs, or gallops Chest: Normal work of breathing and chest excursion bilaterally Lungs: Clear to ausculation bilaterally, no wheezes, no rhonchi Abdomen: Soft, non tender, non distended, normoactive bowel sounds Back: No evidence of trauma or deformity Extremities: No evidence of trauma or deformity, no LE edema Skin: Warm and dry, normal cap refill Neuro: Alert and appropriate, CN intact, normal speech, moving all 4 extremities freely and symmetrically Psychiatric: Normal mood and affect Diagnostic Study Results Labs - Recent Results (from the past 12 hour(s)) URINALYSIS W/ RFLX MICROSCOPIC Collection Time: 05/13/19  3:10 PM  
Result Value Ref Range Color YELLOW  Appearance CLEAR    
 Specific gravity 1.007 1.005 - 1.030    
 pH (UA) 6.5 5.0 - 8.0 Protein NEGATIVE  NEG mg/dL Glucose NEGATIVE  NEG mg/dL Ketone NEGATIVE  NEG mg/dL Bilirubin NEGATIVE  NEG Blood NEGATIVE  NEG Urobilinogen 0.2 0.2 - 1.0 EU/dL Nitrites NEGATIVE  NEG Leukocyte Esterase NEGATIVE  NEG    
EKG, 12 LEAD, INITIAL Collection Time: 05/13/19  3:15 PM  
Result Value Ref Range Ventricular Rate 78 BPM  
 Atrial Rate 78 BPM  
 P-R Interval 170 ms QRS Duration 86 ms  
 Q-T Interval 356 ms  
 QTC Calculation (Bezet) 405 ms Calculated P Axis 64 degrees Calculated R Axis 11 degrees Calculated T Axis 47 degrees Diagnosis Normal sinus rhythm Possible Left atrial enlargement Low voltage QRS Borderline ECG When compared with ECG of 13-JAN-2017 09:39, No significant change was found HCG URINE, QL. - POC Collection Time: 05/13/19  3:23 PM  
Result Value Ref Range Pregnancy test,urine (POC) NEGATIVE  NEG    
CBC WITH AUTOMATED DIFF Collection Time: 05/13/19  3:30 PM  
Result Value Ref Range WBC 11.7 4.6 - 13.2 K/uL  
 RBC 5.19 4.20 - 5.30 M/uL  
 HGB 14.6 12.0 - 16.0 g/dL HCT 43.5 35.0 - 45.0 % MCV 83.8 74.0 - 97.0 FL  
 MCH 28.1 24.0 - 34.0 PG  
 MCHC 33.6 31.0 - 37.0 g/dL  
 RDW 12.8 11.6 - 14.5 % PLATELET 085 087 - 697 K/uL MPV 10.3 9.2 - 11.8 FL  
 NEUTROPHILS 66 40 - 73 % LYMPHOCYTES 24 21 - 52 % MONOCYTES 8 3 - 10 % EOSINOPHILS 2 0 - 5 % BASOPHILS 0 0 - 2 %  
 ABS. NEUTROPHILS 7.6 1.8 - 8.0 K/UL  
 ABS. LYMPHOCYTES 2.8 0.9 - 3.6 K/UL  
 ABS. MONOCYTES 0.9 0.05 - 1.2 K/UL  
 ABS. EOSINOPHILS 0.3 0.0 - 0.4 K/UL  
 ABS. BASOPHILS 0.0 0.0 - 0.1 K/UL  
 DF AUTOMATED METABOLIC PANEL, COMPREHENSIVE Collection Time: 05/13/19  3:30 PM  
Result Value Ref Range Sodium 140 136 - 145 mmol/L Potassium 3.7 3.5 - 5.5 mmol/L Chloride 104 100 - 108 mmol/L  
 CO2 30 21 - 32 mmol/L  Anion gap 6 3.0 - 18 mmol/L  
 Glucose 99 74 - 99 mg/dL BUN 7 7.0 - 18 MG/DL Creatinine 1.23 0.6 - 1.3 MG/DL  
 BUN/Creatinine ratio 6 (L) 12 - 20 GFR est AA 57 (L) >60 ml/min/1.73m2 GFR est non-AA 47 (L) >60 ml/min/1.73m2 Calcium 9.3 8.5 - 10.1 MG/DL Bilirubin, total 0.6 0.2 - 1.0 MG/DL  
 ALT (SGPT) 24 13 - 56 U/L  
 AST (SGOT) 10 (L) 15 - 37 U/L Alk. phosphatase 110 45 - 117 U/L Protein, total 7.3 6.4 - 8.2 g/dL Albumin 4.1 3.4 - 5.0 g/dL Globulin 3.2 2.0 - 4.0 g/dL A-G Ratio 1.3 0.8 - 1.7 LIPASE Collection Time: 05/13/19  3:30 PM  
Result Value Ref Range Lipase 82 73 - 393 U/L MAGNESIUM Collection Time: 05/13/19  3:30 PM  
Result Value Ref Range Magnesium 2.1 1.6 - 2.6 mg/dL Radiologic Studies -  
CT ABD PELV W CONT Final Result IMPRESSION:  
  
1. No acute intra-abdominal or pelvic abnormality demonstrated. XR CHEST PORT Final Result IMPRESSION:  
  
No acute radiographic cardiopulmonary abnormality. CT Results  (Last 48 hours) 05/13/19 1654  CT ABD PELV W CONT Final result Impression:  IMPRESSION:  
   
1. No acute intra-abdominal or pelvic abnormality demonstrated. Narrative:  EXAM: CT of the abdomen and pelvis INDICATION: Abdominal pain status post endoscopy. COMPARISON: CT 8/1/2015 TECHNIQUE: Axial CT imaging of the abdomen and pelvis was performed with  
intravenous contrast. Multiplanar reformats were generated. One or more dose reduction techniques were used on this CT: automated exposure  
control, adjustment of the mAs and/or kVp according to patient size, and  
iterative reconstruction techniques. The specific techniques used on this CT  
exam have been documented in the patient's electronic medical record. Digital  
Imaging and Communications in Medicine (DICOM) format image data are available  
to nonaffiliated external healthcare facilities or entities on a secure, media free, reciprocally searchable basis with patient authorization for at least a  
12-month period after this study. _______________ FINDINGS: Images to the upper abdomen are mildly degraded by respiratory motion  
artifact. LOWER CHEST: Minor atelectasis at the right lung base. Left lung base clear. Normal cardiac size. No pericardial effusion. LIVER, BILIARY: Hepatic parenchymal enhancement uniform. No focal hepatic  
lesion. No biliary dilation. Gallbladder is unremarkable. PANCREAS: Normal.  
   
SPLEEN: Normal.  
   
ADRENALS: Normal.  
   
KIDNEYS/URETERS/BLADDER: Symmetric renal enhancement without hydronephrosis or  
urolithiasis. No focal renal lesion. Urinary bladder normal in appearance. PELVIC ORGANS: Uterus and adnexa are unremarkable. VASCULATURE: Unremarkable LYMPH NODES: No enlarged lymph nodes. GASTROINTESTINAL TRACT: No bowel dilation or wall thickening. No bowel  
obstruction. No free intraperitoneal gas. BONES: No acute or aggressive osseous abnormalities identified. Mild lower  
lumbar spondylosis and facet joint osteoarthrosis L5-S1  
   
OTHER: None.  
   
_______________ CXR Results  (Last 48 hours) 05/13/19 1527  XR CHEST PORT Final result Impression:  IMPRESSION:  
   
No acute radiographic cardiopulmonary abnormality. Narrative:  EXAM: XR CHEST PORT  
   
CLINICAL INDICATION/HISTORY: Epigastric abdominal pain  
-Additional: None COMPARISON: 11/13/2017 TECHNIQUE: Upright AP frontal view of the chest  
   
_______________ FINDINGS:  
   
HEART AND MEDIASTINUM: Normal cardiac size and mediastinal contours. LUNGS AND PLEURAL SPACES: No focal pneumonic consolidation, pneumothorax, or  
pleural effusion. BONY THORAX AND SOFT TISSUES: No acute osseous abnormality. No free  
intraperitoneal gas demonstrated on this upright view. _______________ Medical Decision Making I am the first provider for this patient. I reviewed the vital signs, available nursing notes, past medical history, past surgical history, family history and social history. Vital Signs-Reviewed the patient's vital signs. Pulse Oximetry Analysis -100 % on RA Cardiac Monitor: 
Rate: 88 bpm 
Rhythm: Regular EKG interpretation: (Preliminary) 3:24 PM  
Normal sinus rhythm at 78 bpm.  QTc 405 ms. No acute ST elevations. Records Reviewed: Nursing Notes and Old Medical Records Provider Notes:  
40 y.o. female presenting with worsening epigastric pain, difficulty tolerating p.o. since her endoscopy 3 days prior. On exam, patient is afebrile with appropriate vital signs. She does not appear acutely ill. Benign abdominal exam.  Will evaluate for intra-abdominal complications of her endoscopy, and will obtain a CT scan of the abdomen pelvis. We will also obtain EKG and chest x-ray although low suspicion for ACS or esophageal perforation. Jareth Smith Procedures: 
Procedures ED Course:  
2:55 PM Initial assessment performed. The patients presenting problems have been discussed, and they are in agreement with the care plan formulated and outlined with them. I have encouraged them to ask questions as they arise throughout their visit. 5:18 PM 
No leukocytosis or bandemia. No metabolic derangements. CT scan showing no acute abnormality. Patient with history of epigastric pain, dysphagia, odynophagia. Urged close follow-up with her gastroenterology who just completed her endoscopy. Diagnosis and Disposition DISCHARGE NOTE: 
5:18 PM 
 
Maile Alonso's  results have been reviewed with her. She has been counseled regarding her diagnosis, treatment, and plan. She verbally conveys understanding and agreement of the signs, symptoms, diagnosis, treatment and prognosis and additionally agrees to follow up as discussed. She also agrees with the care-plan and conveys that all of her questions have been answered. I have also provided discharge instructions for her that include: educational information regarding their diagnosis and treatment, and list of reasons why they would want to return to the ED prior to their follow-up appointment, should her condition change. She has been provided with education for proper emergency department utilization. CLINICAL IMPRESSION: 
 
1. Abdominal pain, epigastric 2. S/P endoscopy PLAN: 
1. D/C Home 2. Current Discharge Medication List  
  
 
3. Follow-up Information Follow up With Specialties Details Why Contact Info Roge Carlton MD Internal Medicine Schedule an appointment as soon as possible for a visit in 2 days  [address] [facility] [address] [facility] [address] 
[phone] Placido Flores MD Gastroenterology Schedule an appointment as soon as possible for a visit  [address] [facility] [address] [facility] [address] 
[phone] [facility] EMERGENCY DEPT Emergency Medicine  As needed if symptoms worsen [address] [id number] 
[phone]  
  
 
____________________________________ Please note that this dictation was completed with [employer]Less, the computer voice recognition software. Quite often unanticipated grammatical, syntax, homophones, and other interpretive errors are inadvertently transcribed by the computer software. Please disregard these errors. Please excuse any errors that have escaped final proofreading.

## 2019-05-13 NOTE — DISCHARGE INSTRUCTIONS
You were seen and evaluated in the Emergency Department. Please understand that your work up is not all encompassing and you should follow up with your primary care physician for further management and continuity of care. Please return to Emergency Department or seek medical attention immediately if you have acute worsening in your symptoms or develop chest pain, shortness of breath, repeated vomiting, fever, altered level of consciousness, coughing up blood, or start sweating and feel clammy. If you were prescribed any medicine for home, please take as prescribed by your health-care provider. If you were given any follow-up appointments or numbers to call, please do so as instructed. Avoid any tobacco products or excessive alcohol. Patient Education        Upper GI Endoscopy: What to Expect at Home  Your Recovery  After you have an endoscopy, you will stay at the hospital or clinic for 1 to 2 hours. This will allow the medicine to wear off. You will be able to go home after your doctor or nurse checks to make sure you are not having any problems. You may have to stay overnight if you had treatment during the test. You may have a sore throat for a day or two after the test.  This care sheet gives you a general idea about what to expect after the test.  How can you care for yourself at home? Activity  · Rest as much as you need to after you go home. · You should be able to go back to your usual activities the day after the test.  Diet  · Follow your doctor's directions for eating after the test.  · Drink plenty of fluids (unless your doctor has told you not to). Medications  · If you have a sore throat the day after the test, use an over-the-counter spray to numb your throat. Follow-up care is a key part of your treatment and safety. Be sure to make and go to all appointments, and call your doctor if you are having problems.  It's also a good idea to know your test results and keep a list of the medicines you take. When should you call for help? Call 911 anytime you think you may need emergency care. For example, call if:    · You passed out (loses consciousness).     · You have trouble breathing.     · You pass maroon or bloody stools.    Call your doctor now or seek immediate medical care if:    · You have pain that does not get better after your take pain medicine.     · You have new or worse belly pain.     · You have blood in your stools.     · You are sick to your stomach and cannot keep fluids down.     · You have a fever.     · You cannot pass stools or gas.    Watch closely for changes in your health, and be sure to contact your doctor if:    · Your throat still hurts after a day or two.     · You do not get better as expected. Where can you learn more? Go to http://lashay-marty.info/. Enter ([phone] 784-222 in the search box to learn more about \"Upper GI Endoscopy: What to Expect at Home. \"  Current as of: March 27, 2018  Content Version: 11.9  © 8468-6905 [employer]. Care instructions adapted under license by [employer] (which disclaims liability or warranty for this information). If you have questions about a medical condition or this instruction, always ask your healthcare professional. [address] any warranty or liability for your use of this information.

## 2019-05-13 NOTE — ED NOTES
Patient given copy of dc instructions and 0 script(s). Patient verbalized understanding of instructions and script (s). Patient given a current medication reconciliation form and verbalized understanding of their medications. Patient verbalized understanding of the importance of discussing medications with  his or her physician or clinic they will be following up with. Patient alert and oriented and in no acute distress. Patient discharged home ambulatory with self.  \

## 2019-05-13 NOTE — ED TRIAGE NOTES
Patient states that she had a procedure on Friday where they took biopsies of her esophagus and she has been having abdominal cramping since.

## 2019-05-13 NOTE — ED NOTES
Pt back from CT and states that she is having a reaction from the contrast given in CT. Pt says that her BP is elevated and that she feels flushed. ASked pt if anyone had taken her BP in CT and she states that no one took her BP, it jsut feels elevated. Vitals checked: /64, HR 74, NSR. O2 100% RR 15. Provided verbal reassurance to pt that contrast injection can make you feel very flushed and warm. Central monitor in place. Pt complains of nausea and requests Zofran. Medication administered per request. IV fluids continue to infuse, approx 300mL remain.

## 2019-08-10 ENCOUNTER — APPOINTMENT (OUTPATIENT)
Dept: CT IMAGING | Age: 44
End: 2019-08-10
Attending: EMERGENCY MEDICINE
Payer: MEDICAID

## 2019-08-10 ENCOUNTER — HOSPITAL ENCOUNTER (EMERGENCY)
Age: 44
Discharge: HOME | End: 2019-08-10
Attending: EMERGENCY MEDICINE
Payer: MEDICAID

## 2019-08-10 VITALS
TEMPERATURE: 97.8 F | SYSTOLIC BLOOD PRESSURE: 120 MMHG | RESPIRATION RATE: 16 BRPM | BODY MASS INDEX: 34.15 KG/M2 | HEIGHT: 64 IN | OXYGEN SATURATION: 100 % | WEIGHT: 200 LBS | HEART RATE: 73 BPM | DIASTOLIC BLOOD PRESSURE: 69 MMHG

## 2019-08-10 DIAGNOSIS — K52.9 COLITIS: ICD-10-CM

## 2019-08-10 DIAGNOSIS — R10.84 ABDOMINAL PAIN, GENERALIZED: ICD-10-CM

## 2019-08-10 DIAGNOSIS — K62.5 RECTAL BLEEDING: Primary | ICD-10-CM

## 2019-08-10 LAB
ALBUMIN SERPL-MCNC: 3.9 G/DL (ref 3.4–5)
ALBUMIN/GLOB SERPL: 1.4 {RATIO} (ref 0.8–1.7)
ALP SERPL-CCNC: 104 U/L (ref 45–117)
ALT SERPL-CCNC: 20 U/L (ref 13–56)
ANION GAP SERPL CALC-SCNC: 5 MMOL/L (ref 3–18)
AST SERPL-CCNC: 12 U/L (ref 10–38)
BASOPHILS # BLD: 0 K/UL (ref 0–0.1)
BASOPHILS NFR BLD: 0 % (ref 0–2)
BILIRUB SERPL-MCNC: 0.8 MG/DL (ref 0.2–1)
BUN SERPL-MCNC: 5 MG/DL (ref 7–18)
BUN/CREAT SERPL: 5 (ref 12–20)
CALCIUM SERPL-MCNC: 8.9 MG/DL (ref 8.5–10.1)
CHLORIDE SERPL-SCNC: 106 MMOL/L (ref 100–111)
CO2 SERPL-SCNC: 31 MMOL/L (ref 21–32)
CREAT SERPL-MCNC: 0.95 MG/DL (ref 0.6–1.3)
DIFFERENTIAL METHOD BLD: ABNORMAL
EOSINOPHIL # BLD: 0.4 K/UL (ref 0–0.4)
EOSINOPHIL NFR BLD: 3 % (ref 0–5)
ERYTHROCYTE [DISTWIDTH] IN BLOOD BY AUTOMATED COUNT: 13.6 % (ref 11.6–14.5)
GLOBULIN SER CALC-MCNC: 2.7 G/DL (ref 2–4)
GLUCOSE SERPL-MCNC: 116 MG/DL (ref 74–99)
HCG SERPL QL: NEGATIVE
HCT VFR BLD AUTO: 41.8 % (ref 35–45)
HGB BLD-MCNC: 14 G/DL (ref 12–16)
LACTATE BLD-SCNC: 1.01 MMOL/L (ref 0.4–2)
LYMPHOCYTES # BLD: 2.3 K/UL (ref 0.9–3.6)
LYMPHOCYTES NFR BLD: 19 % (ref 21–52)
MCH RBC QN AUTO: 28.1 PG (ref 24–34)
MCHC RBC AUTO-ENTMCNC: 33.5 G/DL (ref 31–37)
MCV RBC AUTO: 83.8 FL (ref 74–97)
MONOCYTES # BLD: 1.1 K/UL (ref 0.05–1.2)
MONOCYTES NFR BLD: 9 % (ref 3–10)
NEUTS SEG # BLD: 8.2 K/UL (ref 1.8–8)
NEUTS SEG NFR BLD: 69 % (ref 40–73)
PLATELET # BLD AUTO: 305 K/UL (ref 135–420)
PMV BLD AUTO: 10.2 FL (ref 9.2–11.8)
POTASSIUM SERPL-SCNC: 4 MMOL/L (ref 3.5–5.5)
PROT SERPL-MCNC: 6.6 G/DL (ref 6.4–8.2)
RBC # BLD AUTO: 4.99 M/UL (ref 4.2–5.3)
SODIUM SERPL-SCNC: 142 MMOL/L (ref 136–145)
WBC # BLD AUTO: 12 K/UL (ref 4.6–13.2)

## 2019-08-10 PROCEDURE — 74176 CT ABD & PELVIS W/O CONTRAST: CPT

## 2019-08-10 PROCEDURE — 83605 ASSAY OF LACTIC ACID: CPT

## 2019-08-10 PROCEDURE — 85025 COMPLETE CBC W/AUTO DIFF WBC: CPT

## 2019-08-10 PROCEDURE — 96365 THER/PROPH/DIAG IV INF INIT: CPT

## 2019-08-10 PROCEDURE — 84703 CHORIONIC GONADOTROPIN ASSAY: CPT

## 2019-08-10 PROCEDURE — 74177 CT ABD & PELVIS W/CONTRAST: CPT

## 2019-08-10 PROCEDURE — 74011250636 HC RX REV CODE- 250/636: Performed by: EMERGENCY MEDICINE

## 2019-08-10 PROCEDURE — 99283 EMERGENCY DEPT VISIT LOW MDM: CPT

## 2019-08-10 PROCEDURE — 96375 TX/PRO/DX INJ NEW DRUG ADDON: CPT

## 2019-08-10 PROCEDURE — 96361 HYDRATE IV INFUSION ADD-ON: CPT

## 2019-08-10 PROCEDURE — 80053 COMPREHEN METABOLIC PANEL: CPT

## 2019-08-10 RX ORDER — ONDANSETRON 4 MG/1
4 TABLET, ORALLY DISINTEGRATING ORAL
Qty: 20 TAB | Refills: 0 | Status: SHIPPED | OUTPATIENT
Start: 2019-08-10 | End: 2019-11-13

## 2019-08-10 RX ORDER — FLUCONAZOLE 150 MG/1
150 TABLET ORAL
Qty: 1 TAB | Refills: 0 | Status: SHIPPED | OUTPATIENT
Start: 2019-08-10 | End: 2019-08-10

## 2019-08-10 RX ORDER — ACETAMINOPHEN 10 MG/ML
1000 INJECTION, SOLUTION INTRAVENOUS ONCE
Status: COMPLETED | OUTPATIENT
Start: 2019-08-10 | End: 2019-08-10

## 2019-08-10 RX ORDER — AMOXICILLIN AND CLAVULANATE POTASSIUM 875; 125 MG/1; MG/1
1 TABLET, FILM COATED ORAL 2 TIMES DAILY
Qty: 20 TAB | Refills: 0 | Status: SHIPPED | OUTPATIENT
Start: 2019-08-10 | End: 2019-08-20

## 2019-08-10 RX ORDER — METOCLOPRAMIDE HYDROCHLORIDE 5 MG/ML
10 INJECTION INTRAMUSCULAR; INTRAVENOUS
Status: DISCONTINUED | OUTPATIENT
Start: 2019-08-10 | End: 2019-08-10

## 2019-08-10 RX ORDER — ONDANSETRON 2 MG/ML
4 INJECTION INTRAMUSCULAR; INTRAVENOUS
Status: COMPLETED | OUTPATIENT
Start: 2019-08-10 | End: 2019-08-10

## 2019-08-10 RX ADMIN — ACETAMINOPHEN 1000 MG: 10 INJECTION, SOLUTION INTRAVENOUS at 10:13

## 2019-08-10 RX ADMIN — ONDANSETRON 4 MG: 2 INJECTION INTRAMUSCULAR; INTRAVENOUS at 10:32

## 2019-08-10 RX ADMIN — SODIUM CHLORIDE 1000 ML: 900 INJECTION, SOLUTION INTRAVENOUS at 10:12

## 2019-08-10 NOTE — ED PROVIDER NOTES
EMERGENCY DEPARTMENT HISTORY AND PHYSICAL EXAM    Date: 8/10/2019  Patient Name: Elis Montano    History of Presenting Illness     Chief Complaint   Patient presents with    Rectal Bleeding    Blood in Urine         History Provided By: Patient      Elis Montano is a 40 y.o. female with PMHX of psychiatric disorder, ischemic colitis, OCD, chronic pain, diabetes who presents to the emergency department C/O abdominal discomfort, nausea and rectal bleeding. Patient states her symptoms started this morning when she woke up and went to the bathroom and noticed her urine was dark and thought there might be some blood in her urine. She states when she had a bowel movement she noticed some blood in her stool as well. She states her abdominal pain is located in the lower region and has associated nausea without vomiting. She denies any fevers or chills. States she has had this problem before and has been evaluated in the past but nobody is seem to figure out what the problem is. She states 3 months ago she had an EGD but has not had a colonoscopy in some time. She denies being on blood thinning medications. PCP: Doreen Marcano MD    Current Outpatient Medications   Medication Sig Dispense Refill    ondansetron (ZOFRAN ODT) 4 mg disintegrating tablet Take 1 Tab by mouth every eight (8) hours as needed for Nausea. 20 Tab 0    amoxicillin-clavulanate (AUGMENTIN) 875-125 mg per tablet Take 1 Tab by mouth two (2) times a day for 10 days. 20 Tab 0    fluconazole (DIFLUCAN) 150 mg tablet Take 1 Tab by mouth now for 1 dose. FDA advises cautious prescribing of oral fluconazole in pregnancy.  1 Tab 0    amitriptyline (ELAVIL) 25 mg tablet TK 1 T PO BID  2    OXcarbazepine (TRILEPTAL) 150 mg tablet TK 1 T PO BID  2    propranolol (INDERAL) 40 mg tablet TK 1 T PO BID  2    QUEtiapine (SEROQUEL) 25 mg tablet TK 1 T PO BID  2    naproxen sodium (ANAPROX) 550 mg tablet TK 1 T PO Q 12 H  5    tiZANidine (ZANAFLEX) 2 mg capsule TK 2 CS PO QID  6    gabapentin (NEURONTIN) 300 mg capsule TK 1 C PO 4 TO 5 TIMES A DAY  5    PENTASA 500 mg CR capsule TK 3 CS PO BID  11    amitriptyline (ELAVIL) 75 mg tablet Take  by mouth nightly.  CHOLECALCIFEROL, VITAMIN D3, (VITAMIN D3 PO) Take  by mouth.  clonazePAM (KLONOPIN) 0.5 mg tablet Take 0.5 mg by mouth two (2) times a day.  benztropine (COGENTIN) 0.5 mg tablet TK 1 T PO BID  2       Past History     Past Medical History:  Past Medical History:   Diagnosis Date    Anxiety     Arrhythmia     Chronic pain     Diabetes ([facility] [address]     Dysuria     Gastrointestinal disorder     colitis    Gross hematuria     Heart murmur     Hematuria     HPV (human papilloma virus) infection     Hypertension     Hypothyroidism     Ischemic colitis ([facility] [address]     MS (multiple sclerosis) (HCC)     OCD (obsessive compulsive disorder)     Other ill-defined conditions(799.89)     prescription drug addiction    Pain management     Psychiatric disorder     depression, bipolar anxiety, ocd    Rapid heart rate     Seizures (HCC)     Tattoo     Tattoos    Vitamin D deficiency     Vulvar pain        Past Surgical History:  Past Surgical History:   Procedure Laterality Date    HX ORTHOPAEDIC      back surgery, ulnar nerve    HX ORTHOPAEDIC      back surgery x 2    HX OTHER SURGICAL      ulner nerve surgery    HX TUBAL LIGATION         Family History:  Family History   Problem Relation Age of Onset    Diabetes Mother     Cancer Maternal Grandmother     Breast Cancer Maternal Aunt     Thyroid Disease Maternal Aunt        Social History:  Social History     Tobacco Use    Smoking status: Current Every Day Smoker     Packs/day: 1.50     Types: Cigarettes     Last attempt to quit: 7/10/2017     Years since quittin.0    Smokeless tobacco: Never Used    Tobacco comment: pt stated she quit 2 weeks ago   Substance Use Topics    Alcohol use: No     Comment: occasionally    Drug use:  No Allergies: Allergies   Allergen Reactions    Ciprofloxacin Other (comments)     Can't tolerated, makes nauseous    Sulfatrim Ds Myalgia    Contrast Dye [Iodine] Palpitations    Sulfa (Sulfonamide Antibiotics) Other (comments)    Sulfa (Sulfonamide Antibiotics) Myalgia    Penicillins Rash         Review of Systems   Review of Systems   Constitutional: Negative for fever. Respiratory: Negative for shortness of breath. Cardiovascular: Negative for chest pain. Gastrointestinal: Positive for abdominal pain, blood in stool and nausea. Negative for vomiting. Genitourinary: Positive for hematuria. Negative for urgency, vaginal bleeding, vaginal discharge and vaginal pain. All other systems reviewed and are negative.         Physical Exam     Vitals:    08/10/19 0936 08/10/19 0937 08/10/19 1228   BP: (!) 143/130 140/75 120/69   Pulse: 99 91 73   Resp: 16  16   Temp: 97.8 °F (36.6 °C)     SpO2: 100% 100%    Weight: 90.7 kg (200 lb)     Height: 5' 4\" (1.626 m)       Physical Exam    Nursing notes and vital signs reviewed    Constitutional: Chronically ill-appearing, disheveled, unkempt, non toxic appearing, no acute distress  Head: Normocephalic, Atraumatic  Eyes: Pupils are equal, round, and reactive to light, EOMI  Neck: Supple  Cardiovascular: Regular rate and rhythm, no murmurs, rubs, or gallops  Chest: Normal work of breathing and chest excursion bilaterally  Lungs: Clear to ausculation bilaterally  Abdomen: Soft, mild lower abdominal tenderness, no guarding, no peritoneal signs, non distended, normoactive bowel sounds  Back: No evidence of trauma or deformity  Extremities: No evidence of trauma or deformity, no LE edema  Skin: Warm and dry, normal cap refill  Neuro: Alert and appropriate, CN intact, normal speech, strength and sensation full and symmetric bilaterally, normal gait, normal coordination  Psychiatric: flat affect      Diagnostic Study Results     Labs -     Recent Results (from the past 12 hour(s))   CBC WITH AUTOMATED DIFF    Collection Time: 08/10/19  9:50 AM   Result Value Ref Range    WBC 12.0 4.6 - 13.2 K/uL    RBC 4.99 4.20 - 5.30 M/uL    HGB 14.0 12.0 - 16.0 g/dL    HCT 41.8 35.0 - 45.0 %    MCV 83.8 74.0 - 97.0 FL    MCH 28.1 24.0 - 34.0 PG    MCHC 33.5 31.0 - 37.0 g/dL    RDW 13.6 11.6 - 14.5 %    PLATELET 801 582 - 662 K/uL    MPV 10.2 9.2 - 11.8 FL    NEUTROPHILS 69 40 - 73 %    LYMPHOCYTES 19 (L) 21 - 52 %    MONOCYTES 9 3 - 10 %    EOSINOPHILS 3 0 - 5 %    BASOPHILS 0 0 - 2 %    ABS. NEUTROPHILS 8.2 (H) 1.8 - 8.0 K/UL    ABS. LYMPHOCYTES 2.3 0.9 - 3.6 K/UL    ABS. MONOCYTES 1.1 0.05 - 1.2 K/UL    ABS. EOSINOPHILS 0.4 0.0 - 0.4 K/UL    ABS. BASOPHILS 0.0 0.0 - 0.1 K/UL    DF AUTOMATED     METABOLIC PANEL, COMPREHENSIVE    Collection Time: 08/10/19  9:50 AM   Result Value Ref Range    Sodium 142 136 - 145 mmol/L    Potassium 4.0 3.5 - 5.5 mmol/L    Chloride 106 100 - 111 mmol/L    CO2 31 21 - 32 mmol/L    Anion gap 5 3.0 - 18 mmol/L    Glucose 116 (H) 74 - 99 mg/dL    BUN 5 (L) 7.0 - 18 MG/DL    Creatinine 0.95 0.6 - 1.3 MG/DL    BUN/Creatinine ratio 5 (L) 12 - 20      GFR est AA >60 >60 ml/min/1.73m2    GFR est non-AA >60 >60 ml/min/1.73m2    Calcium 8.9 8.5 - 10.1 MG/DL    Bilirubin, total 0.8 0.2 - 1.0 MG/DL    ALT (SGPT) 20 13 - 56 U/L    AST (SGOT) 12 10 - 38 U/L    Alk. phosphatase 104 45 - 117 U/L    Protein, total 6.6 6.4 - 8.2 g/dL    Albumin 3.9 3.4 - 5.0 g/dL    Globulin 2.7 2.0 - 4.0 g/dL    A-G Ratio 1.4 0.8 - 1.7     HCG QL SERUM    Collection Time: 08/10/19  9:50 AM   Result Value Ref Range    HCG, Ql. NEGATIVE  NEG     POC LACTIC ACID    Collection Time: 08/10/19 10:04 AM   Result Value Ref Range    Lactic Acid (POC) 1.01 0.40 - 2.00 mmol/L       Radiologic Studies -   CT ABD PELV WO CONT   Final Result   IMPRESSION:       1. Distal colon is poorly distended.  Possible wall thickening of the sigmoid   colon is favored to be secondary to the under distention as opposed to an   inflammatory/infectious colitis. Correlate with the patient's clinical   symptomatology. 2. No other acute radiographic abnormality. CT Results  (Last 48 hours)               08/10/19 1106  CT ABD PELV WO CONT Final result    Impression:  IMPRESSION:        1. Distal colon is poorly distended. Possible wall thickening of the sigmoid   colon is favored to be secondary to the under distention as opposed to an   inflammatory/infectious colitis. Correlate with the patient's clinical   symptomatology. 2. No other acute radiographic abnormality. Narrative:  EXAM: CT abdomen pelvis       CLINICAL INDICATION/HISTORY: Abdominal pain and rectal bleeding     > Additional: None. COMPARISON: 05/13/2019       TECHNIQUE: Axial CT imaging of the abdomen and pelvis was performed without   intravenous contrast. Multiplanar reformats were generated. One or more dose   reduction techniques were used on this CT: automated exposure control,   adjustment of the mAs and/or kVp according to patient size, and iterative   reconstruction techniques. The specific techniques used on this CT exam have   been documented in the patient's electronic medical record. Digital Imaging and Communications in Medicine (DICOM) format image data are   available to nonaffiliated external healthcare facilities or entities on a   secure, media free, reciprocally searchable basis with patient authorization for   at least a 12 month period after this study. _______________       FINDINGS:       LOWER CHEST: Unremarkable. LIVER, BILIARY: Liver is normal. No biliary dilation. Gallbladder is   unremarkable. PANCREAS: Normal.       SPLEEN: Normal.       ADRENALS: Normal.       KIDNEYS/URETERS/BLADDER: Normal.       PELVIC ORGANS: Unremarkable. VASCULATURE: Unremarkable. LYMPH NODES: No enlarged lymph nodes. GASTROINTESTINAL TRACT: No bowel dilation.  The distal colon is not well distended but there may be circumferential wall thickening of the sigmoid colon. No significant pericolonic inflammatory changes. Normal appendix. BONES: No acute or aggressive osseous abnormalities identified. OTHER: None.       _______________               CXR Results  (Last 48 hours)    None          Medications given in the ED-  Medications   sodium chloride 0.9 % bolus infusion 1,000 mL (0 mL IntraVENous IV Completed 8/10/19 1217)   acetaminophen (OFIRMEV) infusion 1,000 mg (0 mg IntraVENous IV Completed 8/10/19 1032)   ondansetron (ZOFRAN) injection 4 mg (4 mg IntraVENous Given 8/10/19 1032)         Medical Decision Making   I am the first provider for this patient. I reviewed the vital signs, available nursing notes, past medical history, past surgical history, family history and social history. Vital Signs-Reviewed the patient's vital signs. Pulse Oximetry Analysis - 100% on room air     Cardiac Monitor:  Rate: 73 bpm  Rhythm: sinus      Records Reviewed: Nursing Notes    Provider Notes (Medical Decision Making): Sandra Robbins is a 40 y.o. female complaining of abdominal pain with rectal bleeding, patient has history of ischemic colitis. Will obtain laboratory work and CT scan of the abdomen and pelvis to rule out acute process    Procedures:  Procedures    ED Course:   Laboratory findings unremarkable. CT scan showed some thickening of the colon wall possibly consistent with colitis. Considering the rectal bleeding will treat empirically with Augmentin. Discussed with the patient the results of her laboratory findings and imaging studies. Recommend continue with Tylenol for pain and antibiotics as directed for her symptoms and follow-up with her primary care physician otherwise come back to the emergency department if her symptoms worsen.   She understands and agrees to plan    Diagnosis and Disposition         DISCHARGE NOTE:    Amanda Alonso's  results have been reviewed with her.  She has been counseled regarding her diagnosis, treatment, and plan. She verbally conveys understanding and agreement of the signs, symptoms, diagnosis, treatment and prognosis and additionally agrees to follow up as discussed. She also agrees with the care-plan and conveys that all of her questions have been answered. I have also provided discharge instructions for her that include: educational information regarding their diagnosis and treatment, and list of reasons why they would want to return to the ED prior to their follow-up appointment, should her condition change. She has been provided with education for proper emergency department utilization. CLINICAL IMPRESSION:    1. Rectal bleeding    2. Colitis        PLAN:  1. D/C Home  2. Discharge Medication List as of 8/10/2019 12:22 PM      START taking these medications    Details   ondansetron (ZOFRAN ODT) 4 mg disintegrating tablet Take 1 Tab by mouth every eight (8) hours as needed for Nausea., Normal, Disp-20 Tab, R-0      amoxicillin-clavulanate (AUGMENTIN) 875-125 mg per tablet Take 1 Tab by mouth two (2) times a day for 10 days. , Normal, Disp-20 Tab, R-0      fluconazole (DIFLUCAN) 150 mg tablet Take 1 Tab by mouth now for 1 dose. FDA advises cautious prescribing of oral fluconazole in pregnancy. , Normal, Disp-1 Tab, R-0         CONTINUE these medications which have NOT CHANGED    Details   !! amitriptyline (ELAVIL) 25 mg tablet TK 1 T PO BID, Historical Med, R-2      OXcarbazepine (TRILEPTAL) 150 mg tablet TK 1 T PO BID, Historical Med, R-2      propranolol (INDERAL) 40 mg tablet TK 1 T PO BID, Historical Med, R-2      QUEtiapine (SEROQUEL) 25 mg tablet TK 1 T PO BID, Historical Med, R-2      naproxen sodium (ANAPROX) 550 mg tablet TK 1 T PO Q 12 H, Historical Med, R-5      tiZANidine (ZANAFLEX) 2 mg capsule TK 2 CS PO QID, Historical Med, R-6      gabapentin (NEURONTIN) 300 mg capsule TK 1 C PO 4 TO 5 TIMES A DAY, Historical Med, R-5 PENTASA 500 mg CR capsule TK 3 CS PO BID, Historical Med, R-11, MINOO      !! amitriptyline (ELAVIL) 75 mg tablet Take  by mouth nightly., Historical Med      CHOLECALCIFEROL, VITAMIN D3, (VITAMIN D3 PO) Take  by mouth., Historical Med      clonazePAM (KLONOPIN) 0.5 mg tablet Take 0.5 mg by mouth two (2) times a day., Historical Med      benztropine (COGENTIN) 0.5 mg tablet TK 1 T PO BID, Historical Med, R-2       !! - Potential duplicate medications found. Please discuss with provider. 3.   Follow-up Information     Follow up With Specialties Details Why Contact Info    Ioana Verde MD Internal Medicine Schedule an appointment as soon as possible for a visit   [place] [id number] [phone]      [facility] EMERGENCY DEPT Emergency Medicine  If symptoms worsen [address] [place] [id number]  [phone]        _______________________________      Please note that this dictation was completed with [employer], the computer voice recognition software. Quite often unanticipated grammatical, syntax, homophones, and other interpretive errors are inadvertently transcribed by the computer software. Please disregard these errors. Please excuse any errors that have escaped final proofreading.

## 2019-10-02 ENCOUNTER — APPOINTMENT (OUTPATIENT)
Dept: GENERAL RADIOLOGY | Age: 44
End: 2019-10-02
Attending: PHYSICIAN ASSISTANT
Payer: MEDICAID

## 2019-10-02 ENCOUNTER — HOSPITAL ENCOUNTER (EMERGENCY)
Age: 44
Discharge: HOME | End: 2019-10-02
Attending: EMERGENCY MEDICINE
Payer: MEDICAID

## 2019-10-02 VITALS
TEMPERATURE: 97.8 F | BODY MASS INDEX: 32.1 KG/M2 | OXYGEN SATURATION: 100 % | DIASTOLIC BLOOD PRESSURE: 66 MMHG | HEART RATE: 69 BPM | HEIGHT: 64 IN | WEIGHT: 188 LBS | RESPIRATION RATE: 11 BRPM | SYSTOLIC BLOOD PRESSURE: 114 MMHG

## 2019-10-02 DIAGNOSIS — Z86.59 HISTORY OF BIPOLAR DISORDER: ICD-10-CM

## 2019-10-02 DIAGNOSIS — R63.0 DECREASED APPETITE: Primary | ICD-10-CM

## 2019-10-02 LAB
ALBUMIN SERPL-MCNC: 3.8 G/DL (ref 3.4–5)
ALBUMIN/GLOB SERPL: 1.3 {RATIO} (ref 0.8–1.7)
ALP SERPL-CCNC: 90 U/L (ref 45–117)
ALT SERPL-CCNC: 29 U/L (ref 13–56)
ANION GAP SERPL CALC-SCNC: 3 MMOL/L (ref 3–18)
APPEARANCE UR: CLEAR
AST SERPL-CCNC: 17 U/L (ref 10–38)
BACTERIA URNS QL MICRO: ABNORMAL /HPF
BASOPHILS # BLD: 0 K/UL (ref 0–0.1)
BASOPHILS NFR BLD: 0 % (ref 0–2)
BILIRUB SERPL-MCNC: 1 MG/DL (ref 0.2–1)
BILIRUB UR QL: NEGATIVE
BUN SERPL-MCNC: 8 MG/DL (ref 7–18)
BUN/CREAT SERPL: 10 (ref 12–20)
CALCIUM SERPL-MCNC: 9 MG/DL (ref 8.5–10.1)
CHLORIDE SERPL-SCNC: 105 MMOL/L (ref 100–111)
CK MB CFR SERPL CALC: NORMAL % (ref 0–4)
CK MB SERPL-MCNC: <1 NG/ML (ref 5–25)
CK SERPL-CCNC: 42 U/L (ref 26–192)
CO2 SERPL-SCNC: 31 MMOL/L (ref 21–32)
COLOR UR: YELLOW
CREAT SERPL-MCNC: 0.84 MG/DL (ref 0.6–1.3)
DIFFERENTIAL METHOD BLD: NORMAL
EOSINOPHIL # BLD: 0.2 K/UL (ref 0–0.4)
EOSINOPHIL NFR BLD: 3 % (ref 0–5)
EPITH CASTS URNS QL MICRO: ABNORMAL /LPF (ref 0–5)
ERYTHROCYTE [DISTWIDTH] IN BLOOD BY AUTOMATED COUNT: 12.9 % (ref 11.6–14.5)
GLOBULIN SER CALC-MCNC: 3 G/DL (ref 2–4)
GLUCOSE SERPL-MCNC: 95 MG/DL (ref 74–99)
GLUCOSE UR STRIP.AUTO-MCNC: NEGATIVE MG/DL
HCT VFR BLD AUTO: 42.5 % (ref 35–45)
HGB BLD-MCNC: 14.1 G/DL (ref 12–16)
HGB UR QL STRIP: NEGATIVE
KETONES UR QL STRIP.AUTO: NEGATIVE MG/DL
LEUKOCYTE ESTERASE UR QL STRIP.AUTO: ABNORMAL
LIPASE SERPL-CCNC: 101 U/L (ref 73–393)
LYMPHOCYTES # BLD: 2.4 K/UL (ref 0.9–3.6)
LYMPHOCYTES NFR BLD: 26 % (ref 21–52)
MCH RBC QN AUTO: 27.6 PG (ref 24–34)
MCHC RBC AUTO-ENTMCNC: 33.2 G/DL (ref 31–37)
MCV RBC AUTO: 83.3 FL (ref 74–97)
MONOCYTES # BLD: 0.9 K/UL (ref 0.05–1.2)
MONOCYTES NFR BLD: 10 % (ref 3–10)
NEUTS SEG # BLD: 5.6 K/UL (ref 1.8–8)
NEUTS SEG NFR BLD: 61 % (ref 40–73)
NITRITE UR QL STRIP.AUTO: NEGATIVE
PH UR STRIP: 6 [PH] (ref 5–8)
PLATELET # BLD AUTO: 329 K/UL (ref 135–420)
PMV BLD AUTO: 10.4 FL (ref 9.2–11.8)
POTASSIUM SERPL-SCNC: 3.7 MMOL/L (ref 3.5–5.5)
PROT SERPL-MCNC: 6.8 G/DL (ref 6.4–8.2)
PROT UR STRIP-MCNC: NEGATIVE MG/DL
RBC # BLD AUTO: 5.1 M/UL (ref 4.2–5.3)
RBC #/AREA URNS HPF: ABNORMAL /HPF (ref 0–5)
SODIUM SERPL-SCNC: 139 MMOL/L (ref 136–145)
SP GR UR REFRACTOMETRY: 1.01 (ref 1–1.03)
TROPONIN I SERPL-MCNC: <0.02 NG/ML (ref 0–0.04)
UROBILINOGEN UR QL STRIP.AUTO: 0.2 EU/DL (ref 0.2–1)
WBC # BLD AUTO: 9.2 K/UL (ref 4.6–13.2)
WBC URNS QL MICRO: ABNORMAL /HPF (ref 0–5)

## 2019-10-02 PROCEDURE — 71046 X-RAY EXAM CHEST 2 VIEWS: CPT

## 2019-10-02 PROCEDURE — 83690 ASSAY OF LIPASE: CPT

## 2019-10-02 PROCEDURE — 85025 COMPLETE CBC W/AUTO DIFF WBC: CPT

## 2019-10-02 PROCEDURE — 82550 ASSAY OF CK (CPK): CPT

## 2019-10-02 PROCEDURE — 81001 URINALYSIS AUTO W/SCOPE: CPT

## 2019-10-02 PROCEDURE — 99285 EMERGENCY DEPT VISIT HI MDM: CPT

## 2019-10-02 PROCEDURE — 74011250636 HC RX REV CODE- 250/636: Performed by: PHYSICIAN ASSISTANT

## 2019-10-02 PROCEDURE — 96374 THER/PROPH/DIAG INJ IV PUSH: CPT

## 2019-10-02 PROCEDURE — 80053 COMPREHEN METABOLIC PANEL: CPT

## 2019-10-02 PROCEDURE — 93005 ELECTROCARDIOGRAM TRACING: CPT

## 2019-10-02 RX ORDER — ONDANSETRON 2 MG/ML
4 INJECTION INTRAMUSCULAR; INTRAVENOUS
Status: COMPLETED | OUTPATIENT
Start: 2019-10-02 | End: 2019-10-02

## 2019-10-02 RX ADMIN — SODIUM CHLORIDE 1000 ML: 900 INJECTION, SOLUTION INTRAVENOUS at 16:10

## 2019-10-02 RX ADMIN — ONDANSETRON 4 MG: 2 INJECTION INTRAMUSCULAR; INTRAVENOUS at 16:10

## 2019-10-02 NOTE — ED PROVIDER NOTES
EMERGENCY DEPARTMENT HISTORY AND PHYSICAL EXAM    Date: 10/2/2019  Patient Name: Richy Chung    History of Presenting Illness     Chief Complaint   Patient presents with    Decreased Appetite         History Provided By: Patient    Richy Chung is a 40 y.o. female with PMHX of bipolar, anxiety, depression, chronic pain who presents to the emergency department C/O decreased appetite. Associated sxs include CP, SOB, nausea, dysuria for several months. Patient reports being seen in this facility approximately 2 months ago diagnosed with colitis and prescribed antibiotics. She states that she had a Colonscopy shortly after and was dx \"Eosinphilic inflammation\". Patient states that when she was seen in this emergency department and diagnosed with colitis she never took the prescribed antibiotics. She states that she also followed up and had a colonoscopy and she was prescribed prednisone by her GI doctor for which she did not take either. Patient states that she has to force herself to eat however and is drinking some water. Endorses some generalized fatigue. Pt denies bloody stools, fever, cough, congestion, vomiting, and any other sxs or complaints. PCP: Lydia Rivera MD    Current Outpatient Medications   Medication Sig Dispense Refill    ondansetron (ZOFRAN ODT) 4 mg disintegrating tablet Take 1 Tab by mouth every eight (8) hours as needed for Nausea.  20 Tab 0    amitriptyline (ELAVIL) 25 mg tablet TK 1 T PO BID  2    benztropine (COGENTIN) 0.5 mg tablet TK 1 T PO BID  2    OXcarbazepine (TRILEPTAL) 150 mg tablet TK 1 T PO BID  2    propranolol (INDERAL) 40 mg tablet TK 1 T PO BID  2    QUEtiapine (SEROQUEL) 25 mg tablet TK 1 T PO BID  2    naproxen sodium (ANAPROX) 550 mg tablet TK 1 T PO Q 12 H  5    tiZANidine (ZANAFLEX) 2 mg capsule TK 2 CS PO QID  6    gabapentin (NEURONTIN) 300 mg capsule TK 1 C PO 4 TO 5 TIMES A DAY  5    PENTASA 500 mg CR capsule TK 3 CS PO BID  11    amitriptyline (ELAVIL) 75 mg tablet Take  by mouth nightly.  CHOLECALCIFEROL, VITAMIN D3, (VITAMIN D3 PO) Take  by mouth.  clonazePAM (KLONOPIN) 0.5 mg tablet Take 0.5 mg by mouth two (2) times a day. Past History     Past Medical History:  Past Medical History:   Diagnosis Date    Anxiety     Arrhythmia     Chronic pain     Diabetes ([facility] [address]     Dysuria     Gastrointestinal disorder     colitis    Gross hematuria     Heart murmur     Hematuria     HPV (human papilloma virus) infection     Hypertension     Hypothyroidism     Ischemic colitis ([facility] [address]     MS (multiple sclerosis) (HCC)     OCD (obsessive compulsive disorder)     Other ill-defined conditions(799.89)     prescription drug addiction    Pain management     Psychiatric disorder     depression, bipolar anxiety, ocd    Rapid heart rate     Seizures (HCC)     Tattoo     Tattoos    Vitamin D deficiency     Vulvar pain        Past Surgical History:  Past Surgical History:   Procedure Laterality Date    HX ORTHOPAEDIC      back surgery, ulnar nerve    HX ORTHOPAEDIC      back surgery x 2    HX OTHER SURGICAL      ulner nerve surgery    HX TUBAL LIGATION         Family History:  Family History   Problem Relation Age of Onset    Diabetes Mother     Cancer Maternal Grandmother     Breast Cancer Maternal Aunt     Thyroid Disease Maternal Aunt        Social History:  Social History     Tobacco Use    Smoking status: Former Smoker     Packs/day: 1.50     Types: Cigarettes     Last attempt to quit: 7/10/2017     Years since quittin.2    Smokeless tobacco: Never Used    Tobacco comment: pt stated she quit 2 weeks ago   Substance Use Topics    Alcohol use: No     Comment: occasionally    Drug use: No       Allergies:   Allergies   Allergen Reactions    Ciprofloxacin Other (comments)     Can't tolerated, makes nauseous    Sulfatrim Ds Myalgia    Contrast Dye [Iodine] Palpitations    Sulfa (Sulfonamide Antibiotics) Other (comments)  Sulfa (Sulfonamide Antibiotics) Myalgia    Penicillins Rash         Review of Systems   Review of Systems   Constitutional: Negative for fever. Respiratory: Positive for shortness of breath. Negative for cough. Cardiovascular: Positive for leg swelling. Gastrointestinal: Positive for abdominal pain and nausea. Negative for blood in stool, diarrhea and vomiting. Genitourinary: Negative for dysuria and flank pain. Neurological: Negative for syncope. All other systems reviewed and are negative. Physical Exam     Vitals:    10/02/19 1600 10/02/19 1630 10/02/19 1700 10/02/19 1730   BP: 107/69 108/64 111/63 114/66   Pulse:  71 69 69   Resp:  13 15 11   Temp:       SpO2: 100% 100% 100% 100%   Weight:       Height:         Physical Exam   Constitutional: She is oriented to person, place, and time. She appears well-developed and well-nourished. No distress. HENT:   Head: Normocephalic and atraumatic. Eyes: Pupils are equal, round, and reactive to light. Conjunctivae and EOM are normal.   Neck: Normal range of motion. Neck supple. Cardiovascular: Normal rate and regular rhythm. Pulmonary/Chest: Effort normal and breath sounds normal.   Abdominal: Soft. She exhibits no distension. There is no tenderness. There is no rebound and no guarding. Musculoskeletal: Normal range of motion. Neurological: She is alert and oriented to person, place, and time. Skin: Skin is warm and dry. Psychiatric: She has a normal mood and affect. Her behavior is normal.   Nursing note and vitals reviewed.       Diagnostic Study Results     Labs -     Recent Results (from the past 12 hour(s))   CBC WITH AUTOMATED DIFF    Collection Time: 10/02/19  4:00 PM   Result Value Ref Range    WBC 9.2 4.6 - 13.2 K/uL    RBC 5.10 4.20 - 5.30 M/uL    HGB 14.1 12.0 - 16.0 g/dL    HCT 42.5 35.0 - 45.0 %    MCV 83.3 74.0 - 97.0 FL    MCH 27.6 24.0 - 34.0 PG    MCHC 33.2 31.0 - 37.0 g/dL    RDW 12.9 11.6 - 14.5 %    PLATELET 914 481 - 420 K/uL    MPV 10.4 9.2 - 11.8 FL    NEUTROPHILS 61 40 - 73 %    LYMPHOCYTES 26 21 - 52 %    MONOCYTES 10 3 - 10 %    EOSINOPHILS 3 0 - 5 %    BASOPHILS 0 0 - 2 %    ABS. NEUTROPHILS 5.6 1.8 - 8.0 K/UL    ABS. LYMPHOCYTES 2.4 0.9 - 3.6 K/UL    ABS. MONOCYTES 0.9 0.05 - 1.2 K/UL    ABS. EOSINOPHILS 0.2 0.0 - 0.4 K/UL    ABS. BASOPHILS 0.0 0.0 - 0.1 K/UL    DF AUTOMATED     METABOLIC PANEL, COMPREHENSIVE    Collection Time: 10/02/19  4:00 PM   Result Value Ref Range    Sodium 139 136 - 145 mmol/L    Potassium 3.7 3.5 - 5.5 mmol/L    Chloride 105 100 - 111 mmol/L    CO2 31 21 - 32 mmol/L    Anion gap 3 3.0 - 18 mmol/L    Glucose 95 74 - 99 mg/dL    BUN 8 7.0 - 18 MG/DL    Creatinine 0.84 0.6 - 1.3 MG/DL    BUN/Creatinine ratio 10 (L) 12 - 20      GFR est AA >60 >60 ml/min/1.73m2    GFR est non-AA >60 >60 ml/min/1.73m2    Calcium 9.0 8.5 - 10.1 MG/DL    Bilirubin, total 1.0 0.2 - 1.0 MG/DL    ALT (SGPT) 29 13 - 56 U/L    AST (SGOT) 17 10 - 38 U/L    Alk.  phosphatase 90 45 - 117 U/L    Protein, total 6.8 6.4 - 8.2 g/dL    Albumin 3.8 3.4 - 5.0 g/dL    Globulin 3.0 2.0 - 4.0 g/dL    A-G Ratio 1.3 0.8 - 1.7     LIPASE    Collection Time: 10/02/19  4:00 PM   Result Value Ref Range    Lipase 101 73 - 393 U/L   CARDIAC PANEL,(CK, CKMB & TROPONIN)    Collection Time: 10/02/19  4:00 PM   Result Value Ref Range    CK 42 26 - 192 U/L    CK - MB <1.0 <3.6 ng/ml    CK-MB Index  0.0 - 4.0 %     CALCULATION NOT PERFORMED WHEN RESULT IS BELOW LINEAR LIMIT    Troponin-I, QT <0.02 0.0 - 0.045 NG/ML   EKG, 12 LEAD, INITIAL    Collection Time: 10/02/19  4:08 PM   Result Value Ref Range    Ventricular Rate 68 BPM    Atrial Rate 68 BPM    P-R Interval 178 ms    QRS Duration 86 ms    Q-T Interval 372 ms    QTC Calculation (Bezet) 395 ms    Calculated P Axis 52 degrees    Calculated R Axis 19 degrees    Calculated T Axis 42 degrees    Diagnosis       Normal sinus rhythm  Normal ECG  When compared with ECG of 13-MAY-2019 15:15,  No significant change was found     URINALYSIS W/ RFLX MICROSCOPIC    Collection Time: 10/02/19  5:05 PM   Result Value Ref Range    Color YELLOW      Appearance CLEAR      Specific gravity 1.009 1.005 - 1.030      pH (UA) 6.0 5.0 - 8.0      Protein NEGATIVE  NEG mg/dL    Glucose NEGATIVE  NEG mg/dL    Ketone NEGATIVE  NEG mg/dL    Bilirubin NEGATIVE  NEG      Blood NEGATIVE  NEG      Urobilinogen 0.2 0.2 - 1.0 EU/dL    Nitrites NEGATIVE  NEG      Leukocyte Esterase TRACE (A) NEG     URINE MICROSCOPIC ONLY    Collection Time: 10/02/19  5:05 PM   Result Value Ref Range    WBC 0 to 3 0 - 5 /hpf    RBC 0 to 3 0 - 5 /hpf    Epithelial cells FEW 0 - 5 /lpf    Bacteria 1+ (A) NEG /hpf       Radiologic Studies -   XR CHEST PA LAT   Final Result   Impression:      No acute cardiopulmonary disease, allowing for technique. CT Results  (Last 48 hours)    None        CXR Results  (Last 48 hours)               10/02/19 1643  XR CHEST PA LAT Final result    Impression:  Impression:       No acute cardiopulmonary disease, allowing for technique. Narrative:  HISTORY:    -From Provider: angely and GABRIELA   -Additional: None       Technique: CHEST PA AND LATERAL VIEWS       Comparison study:05/13/19       FINDINGS:       HEART AND MEDIASTINUM: Unremarkable. LUNGS AND PLEURAL SPACES: No consolidation, congestive heart failure, pleural   effusion or pneumothorax. BONY THORAX AND SOFT TISSUES: No acute osseous abnormality. Medications given in the ED-  Medications   sodium chloride 0.9 % bolus infusion 1,000 mL (0 mL IntraVENous IV Completed 10/2/19 1752)   ondansetron (ZOFRAN) injection 4 mg (4 mg IntraVENous Given 10/2/19 1610)         Medical Decision Making   I am the first provider for this patient. I reviewed the vital signs, available nursing notes, past medical history, past surgical history, family history and social history.     Vital Signs-Reviewed the patient's vital signs. Records Reviewed: Nursing Notes    Procedures:  Procedures    ED Course:   Initial assessment performed. The patients presenting problems have been discussed, and they are in agreement with the care plan formulated and outlined with them. I have encouraged them to ask questions as they arise throughout their visit. Discussion: 40 y.o. female with history of anxiety depression and bipolar complaining of 2 months of decreased appetite multiple other various complaints. Already seen in the this facility with CT scan with possible colitis as well as colonoscopy as an outpatient with no specific diagnosis. Patient with appropriate vital signs negative auditory findings benign exam.  Plan for discharge with close PCP and GI follow-up with strict return precautions discussed. Diagnosis and Disposition       DISCHARGE NOTE:  Donny Alonso's  results have been reviewed with her. She has been counseled regarding her diagnosis, treatment, and plan. She verbally conveys understanding and agreement of the signs, symptoms, diagnosis, treatment and prognosis and additionally agrees to follow up as discussed. She also agrees with the care-plan and conveys that all of her questions have been answered. I have also provided discharge instructions for her that include: educational information regarding their diagnosis and treatment, and list of reasons why they would want to return to the ED prior to their follow-up appointment, should her condition change. She has been provided with education for proper emergency department utilization. CLINICAL IMPRESSION:    1. Decreased appetite    2. History of bipolar disorder        PLAN:  1. D/C Home  2. Current Discharge Medication List        3.    Follow-up Information     Follow up With Specialties Details Why Contact Fantasma Rizo MD Internal Medicine Schedule an appointment as soon as possible for a visit  [facility] [address] [facility] [address]      [facility] EMERGENCY DEPT Emergency Medicine  As needed, If symptoms worsen [address] [place] [address] [phone]                  Please note that this dictation was completed with [employer], the computer voice recognition software. Quite often unanticipated grammatical, syntax, homophones, and other interpretive errors are inadvertently transcribed by the computer software. Please disregard these errors. Please excuse any errors that have escaped final proofreading.

## 2019-10-02 NOTE — ED NOTES
Pt hourly rounding competed. Safety   Pt (*) resting on stretcher with side rails up and call bell in reach. () in chair    () in parents arms. Toileting   Pt offered ()Bedpan     (*)Assistance to Restroom     ()Urinal  Ongoing Updates  Updated on plan of care and status of test results.   Pain Management  Inquired as to comfort and offered comfort measures:    (*) warm blankets   (*)dimmed lights

## 2019-10-03 LAB
ATRIAL RATE: 68 BPM
CALCULATED P AXIS, ECG09: 52 DEGREES
CALCULATED R AXIS, ECG10: 19 DEGREES
CALCULATED T AXIS, ECG11: 42 DEGREES
DIAGNOSIS, 93000: NORMAL
P-R INTERVAL, ECG05: 178 MS
Q-T INTERVAL, ECG07: 372 MS
QRS DURATION, ECG06: 86 MS
QTC CALCULATION (BEZET), ECG08: 395 MS
VENTRICULAR RATE, ECG03: 68 BPM

## 2019-11-13 ENCOUNTER — APPOINTMENT (OUTPATIENT)
Dept: CT IMAGING | Age: 44
End: 2019-11-13
Attending: EMERGENCY MEDICINE
Payer: MEDICAID

## 2019-11-13 ENCOUNTER — HOSPITAL ENCOUNTER (EMERGENCY)
Age: 44
Discharge: HOME | End: 2019-11-13
Attending: EMERGENCY MEDICINE
Payer: MEDICAID

## 2019-11-13 VITALS
TEMPERATURE: 97.8 F | DIASTOLIC BLOOD PRESSURE: 84 MMHG | HEIGHT: 64 IN | OXYGEN SATURATION: 99 % | SYSTOLIC BLOOD PRESSURE: 120 MMHG | HEART RATE: 84 BPM | BODY MASS INDEX: 31.58 KG/M2 | RESPIRATION RATE: 18 BRPM | WEIGHT: 185 LBS

## 2019-11-13 DIAGNOSIS — M54.12 CERVICAL RADICULOPATHY: Primary | ICD-10-CM

## 2019-11-13 DIAGNOSIS — M54.2 NECK PAIN: ICD-10-CM

## 2019-11-13 PROCEDURE — 96372 THER/PROPH/DIAG INJ SC/IM: CPT

## 2019-11-13 PROCEDURE — 74011250636 HC RX REV CODE- 250/636: Performed by: EMERGENCY MEDICINE

## 2019-11-13 PROCEDURE — 99283 EMERGENCY DEPT VISIT LOW MDM: CPT

## 2019-11-13 PROCEDURE — 74011000250 HC RX REV CODE- 250: Performed by: EMERGENCY MEDICINE

## 2019-11-13 PROCEDURE — 72125 CT NECK SPINE W/O DYE: CPT

## 2019-11-13 RX ORDER — PREDNISONE 20 MG/1
20 TABLET ORAL 2 TIMES DAILY
Qty: 10 TAB | Refills: 0 | Status: SHIPPED | OUTPATIENT
Start: 2019-11-13 | End: 2019-11-18

## 2019-11-13 RX ORDER — KETOROLAC TROMETHAMINE 30 MG/ML
60 INJECTION, SOLUTION INTRAMUSCULAR; INTRAVENOUS
Status: COMPLETED | OUTPATIENT
Start: 2019-11-13 | End: 2019-11-13

## 2019-11-13 RX ORDER — KETOROLAC TROMETHAMINE 10 MG/1
10 TABLET, FILM COATED ORAL
Qty: 20 TAB | Refills: 0 | Status: SHIPPED | OUTPATIENT
Start: 2019-11-13 | End: 2019-11-18

## 2019-11-13 RX ORDER — LIDOCAINE 4 G/100G
1 PATCH TOPICAL EVERY 24 HOURS
Status: DISCONTINUED | OUTPATIENT
Start: 2019-11-13 | End: 2019-11-13 | Stop reason: HOSPADM

## 2019-11-13 RX ADMIN — KETOROLAC TROMETHAMINE 60 MG: 30 INJECTION, SOLUTION INTRAMUSCULAR at 15:30

## 2019-11-13 NOTE — ED TRIAGE NOTES
Triage: pt states that she was doing some exercises a few days ago when she felt a sharp pain in her neck. Pain has continued and pt feels like her shoulders \"are locked up and my whole spine feels out of whack. \"

## 2019-11-13 NOTE — ED PROVIDER NOTES
EMERGENCY DEPARTMENT HISTORY AND PHYSICAL EXAM    Date: 11/13/2019  Patient Name: Roque Phoenix    History of Presenting Illness     Chief Complaint   Patient presents with    Neck Pain         History Provided By: Patient      Roque Phoenix is a 40 y.o. female with PMHX of chronic pain, bipolar/depression, anxiety who presents to the emergency department C/O neck pain radiating down her arms. She states she was doing some exercise a few days ago and felt a sharp pain in her neck. She states the pain is located in the center of the lower part of her neck and will sometimes radiate down her arms causing a shooting sensation with numbness and tingling. She admits to mild weakness in the arms. She states she has tried many of her muscle relaxants at home including baclofen, Zanaflex, Klonopin. She also notes she is on Neurontin as well as Seroquel for her chronic pain and does not seem to be helping. She states she has been on steroids for her pinched nerves in the past but causes her to have bad side effects. .     PCP: Liliana Dacosta MD    Current Outpatient Medications   Medication Sig Dispense Refill    ketorolac (TORADOL) 10 mg tablet Take 1 Tab by mouth every six (6) hours as needed for Pain for up to 5 days. 20 Tab 0    predniSONE (DELTASONE) 20 mg tablet Take 20 mg by mouth two (2) times a day for 5 days. 10 Tab 0    OXcarbazepine (TRILEPTAL) 150 mg tablet TK 1 T PO BID  2    propranolol (INDERAL) 40 mg tablet TK 1 T PO BID  2    QUEtiapine (SEROQUEL) 25 mg tablet TK 1 T PO BID  2    tiZANidine (ZANAFLEX) 2 mg capsule TK 2 CS PO QID  6    clonazePAM (KLONOPIN) 0.5 mg tablet Take 0.5 mg by mouth two (2) times a day.       amitriptyline (ELAVIL) 25 mg tablet TK 1 T PO BID  2    benztropine (COGENTIN) 0.5 mg tablet TK 1 T PO BID  2    naproxen sodium (ANAPROX) 550 mg tablet TK 1 T PO Q 12 H  5    gabapentin (NEURONTIN) 300 mg capsule TK 1 C PO 4 TO 5 TIMES A DAY  5    amitriptyline (ELAVIL) 75 mg tablet Take  by mouth nightly.  CHOLECALCIFEROL, VITAMIN D3, (VITAMIN D3 PO) Take  by mouth. Past History     Past Medical History:  Past Medical History:   Diagnosis Date    Anxiety     Arrhythmia     Chronic pain     [facility] [address]     Dysuria     Gastrointestinal disorder     colitis    Gross hematuria     Heart murmur     Hematuria     HPV (human papilloma virus) infection     Hypertension     Hypothyroidism     Ischemic colitis ([facility] [address]     MS (multiple sclerosis) (HCC)     OCD (obsessive compulsive disorder)     Other ill-defined conditions(799.89)     prescription drug addiction    Pain management     Psychiatric disorder     depression, bipolar anxiety, ocd    Rapid heart rate     Seizures (HCC)     Tattoo     Tattoos    Vitamin D deficiency     Vulvar pain        Past Surgical History:  Past Surgical History:   Procedure Laterality Date    HX ORTHOPAEDIC      back surgery, ulnar nerve    HX ORTHOPAEDIC      back surgery x 2    HX OTHER SURGICAL      ulner nerve surgery    HX TUBAL LIGATION         Family History:  Family History   Problem Relation Age of Onset    Diabetes Mother     Cancer Maternal Grandmother     Breast Cancer Maternal Aunt     Thyroid Disease Maternal Aunt        Social History:  Social History     Tobacco Use    Smoking status: Former Smoker     Packs/day: 1.50     Types: Cigarettes     Last attempt to quit: 7/10/2017     Years since quittin.3    Smokeless tobacco: Never Used    Tobacco comment: pt stated she quit 2 weeks ago   Substance Use Topics    Alcohol use: No     Comment: occasionally    Drug use: No       Allergies:   Allergies   Allergen Reactions    Ciprofloxacin Other (comments)     Can't tolerated, makes nauseous    Sulfatrim Ds Myalgia    Contrast Dye [Iodine] Palpitations    Sulfa (Sulfonamide Antibiotics) Other (comments)    Sulfa (Sulfonamide Antibiotics) Myalgia    Penicillins Rash         Review of Systems   Review of Systems   Constitutional: Negative for fever. Respiratory: Negative for shortness of breath. Cardiovascular: Negative for chest pain. Musculoskeletal: Positive for arthralgias, myalgias, neck pain and neck stiffness. Negative for back pain. Neurological: Positive for weakness and numbness. Negative for dizziness and headaches. Physical Exam     Vitals:    11/13/19 1503   BP: 120/84   Pulse: 84   Resp: 18   Temp: 97.8 °F (36.6 °C)   SpO2: 99%   Weight: 83.9 kg (185 lb)   Height: 5' 4\" (1.626 m)     Physical Exam    Nursing notes and vital signs reviewed    Constitutional: Non toxic appearing, no acute distress  Head: Normocephalic, Atraumatic  Eyes: Pupils are equal, round, and reactive to light, EOMI  Neck: Tenderness palpation over the trapezius muscles and lower cervical paraspinal musculature bilaterally. There is tenderness to the cervical spine and midline along C5-C7 without step-off or deformity. There is decreased range of motion in her neck secondary to pain  Cardiovascular: Regular rate and rhythm, no murmurs, rubs, or gallops  Chest: Normal work of breathing and chest excursion bilaterally  Lungs: Clear to ausculation bilaterally  Abdomen: Soft, non tender, non distended, normoactive bowel sounds  Back: No evidence of trauma or deformity  Extremities: No evidence of trauma or deformity, no LE edema  Skin: Warm and dry, normal cap refill  Neuro: Alert and appropriate, CN intact, normal speech, strength and sensation full and symmetric bilaterally, normal gait, normal coordination  Psychiatric: Normal mood and affect      Diagnostic Study Results     Labs -   No results found for this or any previous visit (from the past 48 hour(s)). Radiologic Studies -   CT SPINE CERV WO CONT   Final Result   IMPRESSION:         1. C4-C7 spondylosis and degenerative disc disease C5-C7. Right C4-5 facet   arthropathy.       2. Left C5-6 and bilateral C6-7 foraminal stenosis with mild C6-7 central stenosis. CT Results  (Last 48 hours)               11/13/19 1604  CT SPINE CERV WO CONT Final result    Impression:  IMPRESSION:           1. C4-C7 spondylosis and degenerative disc disease C5-C7. Right C4-5 facet   arthropathy. 2. Left C5-6 and bilateral C6-7 foraminal stenosis with mild C6-7 central   stenosis. Narrative:  EXAM: CT CERVICAL SPINE WITHOUT CONTRAST       INDICATION: Neck and bilateral upper extremity radicular pain. No specific   injury. COMPARISON: None. TECHNIQUE: Axial CT imaging of the cervical spine was performed from the skull   base to the thoracic inlet without intravenous contrast. Multiplanar reformats   were generated. One or more dose reduction techniques were used on this CT:   automated exposure control, adjustment of the mAs and/or kVp according to   patient size, and iterative reconstruction techniques. The specific techniques   used on this CT exam have been documented in the patient's electronic medical   record. Digital Imaging and Communications in Medicine (DICOM) format image data   are available to nonaffiliated external healthcare facilities or entities on a   secure, media free, reciprocally searchable basis with patient authorization for   at least a 12-month period after this study. _______________       FINDINGS:       VERTEBRAE AND DISCS: Vertebral alignment and articulation are within normal   limits. Vertebral body heights are maintained. There is mild C4-C7 spondylosis   and mild disc narrowing C5-C7. No displaced fractures are identified. Normal   atlantodental space. Right C4-5 facet arthropathy. SPINAL CANAL AND FORAMINA: Mild left C5-6 and moderate bilateral C6-7 foraminal   stenosis. Mild C6-7 central stenosis. PREVERTEBRAL SOFT TISSUES: Normal.       VISIBLE PORTIONS OF POSTERIOR FOSSA/BRAIN: Normal.       LUNG APICES: Clear.        OTHER: None.       _______________               CXR Results  (Last 50 hours)    None          Medications given in the ED-  Medications   ketorolac tromethamine (TORADOL) 60 mg/2 mL injection 60 mg (60 mg IntraMUSCular Given 11/13/19 1530)         Medical Decision Making   I am the first provider for this patient. I reviewed the vital signs, available nursing notes, past medical history, past surgical history, family history and social history. Vital Signs-Reviewed the patient's vital signs. Records Reviewed: Nursing Notes    Procedures:  Procedures    ED Course:   Patient was given Toradol for pain. CT scan showed evidence of some foraminal stenosis in the lower cervical spine which is consistent with her intermittent symptoms of radiculopathy. I discussed with the patient the results of her CT findings. I recommended NSAIDs as well as prednisone and to continue with her muscle relaxants at home as well as cervical exercises. I recommended she follow-up with an orthopedic surgeon for possibility of an MRI. Recommended to come back to emergency department she develops worsening of her symptoms including weakness/paralysis of her upper extremities. She understands and agrees to plan    Diagnosis and Disposition       DISCHARGE NOTE:    Donny Alonso's  results have been reviewed with her. She has been counseled regarding her diagnosis, treatment, and plan. She verbally conveys understanding and agreement of the signs, symptoms, diagnosis, treatment and prognosis and additionally agrees to follow up as discussed. She also agrees with the care-plan and conveys that all of her questions have been answered. I have also provided discharge instructions for her that include: educational information regarding their diagnosis and treatment, and list of reasons why they would want to return to the ED prior to their follow-up appointment, should her condition change. She has been provided with education for proper emergency department utilization. CLINICAL IMPRESSION:    1. Cervical radiculopathy    2. Neck pain        PLAN:  1. D/C Home  2. Discharge Medication List as of 11/13/2019  4:50 PM      START taking these medications    Details   ketorolac (TORADOL) 10 mg tablet Take 1 Tab by mouth every six (6) hours as needed for Pain for up to 5 days. , Normal, Disp-20 Tab, R-0      predniSONE (DELTASONE) 20 mg tablet Take 20 mg by mouth two (2) times a day for 5 days. , Normal, Disp-10 Tab, R-0         CONTINUE these medications which have NOT CHANGED    Details   OXcarbazepine (TRILEPTAL) 150 mg tablet TK 1 T PO BID, Historical Med, R-2      propranolol (INDERAL) 40 mg tablet TK 1 T PO BID, Historical Med, R-2      QUEtiapine (SEROQUEL) 25 mg tablet TK 1 T PO BID, Historical Med, R-2      tiZANidine (ZANAFLEX) 2 mg capsule TK 2 CS PO QID, Historical Med, R-6      clonazePAM (KLONOPIN) 0.5 mg tablet Take 0.5 mg by mouth two (2) times a day., Historical Med      !! amitriptyline (ELAVIL) 25 mg tablet TK 1 T PO BID, Historical Med, R-2      benztropine (COGENTIN) 0.5 mg tablet TK 1 T PO BID, Historical Med, R-2      naproxen sodium (ANAPROX) 550 mg tablet TK 1 T PO Q 12 H, Historical Med, R-5      gabapentin (NEURONTIN) 300 mg capsule TK 1 C PO 4 TO 5 TIMES A DAY, Historical Med, R-5      !! amitriptyline (ELAVIL) 75 mg tablet Take  by mouth nightly., Historical Med      CHOLECALCIFEROL, VITAMIN D3, (VITAMIN D3 PO) Take  by mouth., Historical Med       !! - Potential duplicate medications found. Please discuss with provider.         3.   Follow-up Information     Follow up With Specialties Details Why Contact Info    Crystal Murphy MD Orthopedic Surgery Schedule an appointment as soon as possible for a visit  for orthopedic spine surgeon [address] [place] [address] [address] [place] [address]      Colleen Rivera MD Internal Medicine Schedule an appointment as soon as possible for a visit  As needed [facility] [address]  [phone]          _______________________________      Please note that this dictation was completed with [employer], the computer voice recognition software. Quite often unanticipated grammatical, syntax, homophones, and other interpretive errors are inadvertently transcribed by the computer software. Please disregard these errors. Please excuse any errors that have escaped final proofreading.

## 2019-11-24 ENCOUNTER — HOSPITAL ENCOUNTER (EMERGENCY)
Age: 44
Discharge: HOME | End: 2019-11-24
Attending: EMERGENCY MEDICINE
Payer: MEDICAID

## 2019-11-24 VITALS
OXYGEN SATURATION: 100 % | WEIGHT: 185 LBS | BODY MASS INDEX: 31.58 KG/M2 | HEART RATE: 71 BPM | HEIGHT: 64 IN | DIASTOLIC BLOOD PRESSURE: 79 MMHG | RESPIRATION RATE: 15 BRPM | SYSTOLIC BLOOD PRESSURE: 139 MMHG | TEMPERATURE: 97.6 F

## 2019-11-24 DIAGNOSIS — G89.29 CHRONIC SCAPULAR PAIN: ICD-10-CM

## 2019-11-24 DIAGNOSIS — G89.29 CHRONIC THORACIC BACK PAIN, UNSPECIFIED BACK PAIN LATERALITY: Primary | ICD-10-CM

## 2019-11-24 DIAGNOSIS — M54.6 CHRONIC THORACIC BACK PAIN, UNSPECIFIED BACK PAIN LATERALITY: Primary | ICD-10-CM

## 2019-11-24 DIAGNOSIS — M89.8X1 CHRONIC SCAPULAR PAIN: ICD-10-CM

## 2019-11-24 PROCEDURE — 99282 EMERGENCY DEPT VISIT SF MDM: CPT

## 2019-11-24 NOTE — ED PROVIDER NOTES
EMERGENCY DEPARTMENT HISTORY AND PHYSICAL EXAM    Date: 11/24/2019  Patient Name: Cassie Trent    History of Presenting Illness       Chief Complaint   Patient presents with    Back Pain       History Provided By: Patient    Additional History (Context):   Cassie Trent is a 40 y.o. female presents to the emergency room with complaints of pain in her scapular areas bilaterally. Symptoms have been ongoing for several weeks. Pain is constant. Current pain scale is 9 out of 10. Pain is worse with any attempted movement of her shoulders. States that she is unable to lift her arms because of the pain in the scapular areas. Has tried Zanaflex as well as second muscle relaxer without any relief. History of chronic back issues. Denies any chest pain or shortness of breath. Patient states that she is here to find out exactly what is causing her pain. PCP: Alec Oseguera MD    Current Outpatient Medications   Medication Sig Dispense Refill    OXcarbazepine (TRILEPTAL) 150 mg tablet TK 1 T PO BID  2    propranolol (INDERAL) 40 mg tablet TK 1 T PO BID  2    tiZANidine (ZANAFLEX) 2 mg capsule TK 2 CS PO QID  6    gabapentin (NEURONTIN) 300 mg capsule TK 1 C PO 4 TO 5 TIMES A DAY  5    amitriptyline (ELAVIL) 25 mg tablet TK 1 T PO BID  2    benztropine (COGENTIN) 0.5 mg tablet TK 1 T PO BID  2    QUEtiapine (SEROQUEL) 25 mg tablet TK 1 T PO BID  2    naproxen sodium (ANAPROX) 550 mg tablet TK 1 T PO Q 12 H  5    amitriptyline (ELAVIL) 75 mg tablet Take  by mouth nightly.  CHOLECALCIFEROL, VITAMIN D3, (VITAMIN D3 PO) Take  by mouth.  clonazePAM (KLONOPIN) 0.5 mg tablet Take 0.5 mg by mouth two (2) times a day.          Past History     Past Medical History:  Past Medical History:   Diagnosis Date    Anxiety     Arrhythmia     Chronic pain     Diabetes ([address]     Dysuria     Gastrointestinal disorder     colitis    Gross hematuria     Heart murmur     Hematuria     HPV (human papilloma virus) infection     Hypertension     Hypothyroidism     [facility] colitis ([facility] [address]     MS (multiple sclerosis) ([facility] [address]     OCD (obsessive compulsive disorder)     Other ill-defined conditions(799.42)     prescription drug addiction    Pain management     Psychiatric disorder     depression, bipolar anxiety, ocd    Rapid heart rate     Seizures (HCC)     Tattoo     Tattoos    Vitamin D deficiency     Vulvar pain        Past Surgical History:  Past Surgical History:   Procedure Laterality Date    HX ORTHOPAEDIC      back surgery, ulnar nerve    HX ORTHOPAEDIC      back surgery x 2    HX OTHER SURGICAL      ulner nerve surgery    HX TUBAL LIGATION         Family History:  Family History   Problem Relation Age of Onset    Diabetes Mother     Cancer Maternal Grandmother     Breast Cancer Maternal Aunt     Thyroid Disease Maternal Aunt        Social History:  Social History     Tobacco Use    Smoking status: Former Smoker     Packs/day: 1.50     Types: Cigarettes     Last attempt to quit: 7/10/2017     Years since quittin.3    Smokeless tobacco: Never Used    Tobacco comment: pt stated she quit 2 weeks ago   Substance Use Topics    Alcohol use: No     Comment: occasionally    Drug use: No       Allergies: Allergies   Allergen Reactions    Ciprofloxacin Other (comments)     Can't tolerated, makes nauseous    Sulfatrim Ds Myalgia    Contrast Dye [Iodine] Palpitations    Sulfa (Sulfonamide Antibiotics) Other (comments)    Sulfa (Sulfonamide Antibiotics) Myalgia    Penicillins Rash         Review of Systems   Review of Systems   Respiratory: Negative. Cardiovascular: Negative. Gastrointestinal: Negative. Musculoskeletal: Positive for back pain. Negative for neck pain and neck stiffness. Neurological: Negative for weakness and numbness. All other systems reviewed and are negative.       Physical Exam     Vitals:    19 1145   BP: 139/79   Pulse: 71   Resp: 15   Temp: 97.6 °F (36.4 °C)   SpO2: 100%   Weight: 83.9 kg (185 lb)   Height: 5' 4\" (1.626 m)     Physical Exam  Vitals signs and nursing note reviewed. Constitutional:       Appearance: Normal appearance. Neck:      Musculoskeletal: Normal range of motion and neck supple. No muscular tenderness. Cardiovascular:      Rate and Rhythm: Normal rate and regular rhythm. Heart sounds: Normal heart sounds. Pulmonary:      Effort: Pulmonary effort is normal.      Breath sounds: Normal breath sounds. Musculoskeletal:      Cervical back: Normal.      Thoracic back: She exhibits tenderness and pain. She exhibits no bony tenderness, no swelling and no deformity. Back:       Comments: Thoracic spine -no signs of any trauma. There is some mild tenderness palpation over the thoracic spinous processes but more so in the parathoracic musculature bilaterally. Pain medially to the scapulas as well as on the underside of the scapular region. Patient also tender over the scapular bones. Shoulders -no signs of any trauma. Able to abduct and flex arms to about 90 degrees before starts having pain in the upper back scapular region. Also tenderness bilateral trapezius regions. Skin:     General: Skin is warm and dry. Neurological:      General: No focal deficit present. Mental Status: She is alert and oriented to person, place, and time. Psychiatric:         Mood and Affect: Mood normal.         Behavior: Behavior normal.         Nursing note and vitals reviewed         Diagnostic Study Results     Labs -   No results found for this or any previous visit (from the past 12 hour(s)). Radiologic Studies   No orders to display     CT Results  (Last 48 hours)    None        CXR Results  (Last 48 hours)    None            Medical Decision Making   I am the first provider for this patient.     I reviewed the vital signs, available nursing notes, past medical history, past surgical history, family history and social history. Vital Signs-Reviewed the patient's vital signs. Records Reviewed: Nursing Notes and Old Medical Records    DDX: Parathoracic muscular pain, spasms. Chronic back pain    Provider Notes:   40 y.o. female presents emergency room with complaints of pain around her scapular region and in her back. Long talk with the patient regarding her current issues. I suspect that she is having a lot of muscle spasm in the area. Possibly pain/inflammation to the musculature associated around the scapula. Advised patient that most likely would just prescribe an anti-inflammatory and continued use of muscle relaxer. However, this is when she stated \"I came here to figure out what was going on for sure\". Offered some x-rays of the thoracic spine and the scapular region. However, patient believed that she required more advanced imaging to better evaluate. Advised her that her symptoms have been ongoing for several weeks. Although home medications were not working, would be willing to try different medications to see if effective. Patient was argumentative regarding her issues. She pulled information out of her purse that was requesting particular x-rays or studies involving the scapular region. Again, I informed her here that x-rays were the most that would be done here today. She did not require or warrant a CT scan or MRI of her back or scapular regions. This would need to be done through Ortho/spine. Patient was still not happy with these answers so I advised her that I would discuss this case with ER attending physician. Patient was then trying to state that her back pain actually started within the last 24 hours by leaning on her arms. Spoke to ER attending Dr. Savita Salazar. Advised the patient's condition. He agreed to go see the patient when had the opportunity to do so in between his patients. Prior to this happening, patient left the emergency room.         Procedures:  Procedures    ED Course:   Initial assessment performed. The patients presenting problems have been discussed, and they are in agreement with the care plan formulated and outlined with them. I have encouraged them to ask questions as they arise throughout their visit. Diagnosis and Disposition       DISCHARGE NOTE:  Jose Alonso's  results have been reviewed with her. She has been counseled regarding her diagnosis, treatment, and plan. She verbally conveys understanding and agreement of the signs, symptoms, diagnosis, treatment and prognosis and additionally agrees to follow up as discussed. She also agrees with the care-plan and conveys that all of her questions have been answered. I have also provided discharge instructions for her that include: educational information regarding their diagnosis and treatment, and list of reasons why they would want to return to the ED prior to their follow-up appointment, should her condition change. She has been provided with education for proper emergency department utilization. CLINICAL IMPRESSION:    1. Chronic thoracic back pain, unspecified back pain laterality    2. Chronic scapular pain        PLAN:  1. D/C Home  2. Discharge Medication List as of 11/24/2019  1:07 PM        3. Follow-up Information     Follow up With Specialties Details Why Contact Info    Colleen Rivera MD Internal Medicine Schedule an appointment as soon as possible for a visit in 1 day  [facility] [address]  [phone]      [facility] EMERGENCY DEPT Emergency Medicine  If symptoms worsen, As needed [address] [address]  [phone]        ____________________________________     Please note that this dictation was completed with [employer], the computer voice recognition software. Quite often unanticipated grammatical, syntax, homophones, and other interpretive errors are inadvertently transcribed by the computer software.   Please disregard these errors. Please excuse any errors that have escaped final proofreading.

## 2019-12-19 ENCOUNTER — HOSPITAL ENCOUNTER (OUTPATIENT)
Dept: MRI IMAGING | Age: 44
Discharge: HOME | End: 2019-12-19
Attending: ORTHOPAEDIC SURGERY
Payer: MEDICAID

## 2019-12-19 DIAGNOSIS — M54.2 NECK PAIN: ICD-10-CM

## 2019-12-19 DIAGNOSIS — M25.519 SHOULDER PAIN: ICD-10-CM

## 2019-12-19 PROCEDURE — 73221 MRI JOINT UPR EXTREM W/O DYE: CPT

## 2019-12-19 PROCEDURE — 72141 MRI NECK SPINE W/O DYE: CPT

## 2020-02-01 ENCOUNTER — APPOINTMENT (OUTPATIENT)
Dept: GENERAL RADIOLOGY | Age: 45
End: 2020-02-01
Attending: EMERGENCY MEDICINE
Payer: MEDICAID

## 2020-02-01 ENCOUNTER — HOSPITAL ENCOUNTER (EMERGENCY)
Age: 45
Discharge: HOME | End: 2020-02-01
Attending: EMERGENCY MEDICINE
Payer: MEDICAID

## 2020-02-01 ENCOUNTER — APPOINTMENT (OUTPATIENT)
Dept: VASCULAR SURGERY | Age: 45
End: 2020-02-01
Attending: EMERGENCY MEDICINE
Payer: MEDICAID

## 2020-02-01 VITALS
SYSTOLIC BLOOD PRESSURE: 106 MMHG | TEMPERATURE: 97.2 F | HEIGHT: 64 IN | RESPIRATION RATE: 17 BRPM | WEIGHT: 175 LBS | BODY MASS INDEX: 29.88 KG/M2 | HEART RATE: 63 BPM | DIASTOLIC BLOOD PRESSURE: 63 MMHG | OXYGEN SATURATION: 100 %

## 2020-02-01 DIAGNOSIS — G89.29 CHRONIC ABDOMINAL PAIN: Primary | ICD-10-CM

## 2020-02-01 DIAGNOSIS — R10.9 CHRONIC ABDOMINAL PAIN: Primary | ICD-10-CM

## 2020-02-01 LAB
ALBUMIN SERPL-MCNC: 3.7 G/DL (ref 3.4–5)
ALBUMIN/GLOB SERPL: 1.2 {RATIO} (ref 0.8–1.7)
ALP SERPL-CCNC: 97 U/L (ref 45–117)
ALT SERPL-CCNC: 29 U/L (ref 13–56)
ANION GAP SERPL CALC-SCNC: 1 MMOL/L (ref 3–18)
APPEARANCE UR: CLEAR
AST SERPL-CCNC: 19 U/L (ref 10–38)
BASOPHILS # BLD: 0 K/UL (ref 0–0.1)
BASOPHILS NFR BLD: 0 % (ref 0–2)
BILIRUB SERPL-MCNC: 1.1 MG/DL (ref 0.2–1)
BILIRUB UR QL: NEGATIVE
BUN SERPL-MCNC: 6 MG/DL (ref 7–18)
BUN/CREAT SERPL: 7 (ref 12–20)
CALCIUM SERPL-MCNC: 9 MG/DL (ref 8.5–10.1)
CHLORIDE SERPL-SCNC: 105 MMOL/L (ref 100–111)
CK MB CFR SERPL CALC: NORMAL % (ref 0–4)
CK MB SERPL-MCNC: <1 NG/ML (ref 5–25)
CK SERPL-CCNC: 36 U/L (ref 26–192)
CO2 SERPL-SCNC: 35 MMOL/L (ref 21–32)
COLOR UR: YELLOW
CREAT SERPL-MCNC: 0.87 MG/DL (ref 0.6–1.3)
DIFFERENTIAL METHOD BLD: ABNORMAL
EOSINOPHIL # BLD: 0.2 K/UL (ref 0–0.4)
EOSINOPHIL NFR BLD: 2 % (ref 0–5)
ERYTHROCYTE [DISTWIDTH] IN BLOOD BY AUTOMATED COUNT: 13.2 % (ref 11.6–14.5)
GLOBULIN SER CALC-MCNC: 3.1 G/DL (ref 2–4)
GLUCOSE SERPL-MCNC: 110 MG/DL (ref 74–99)
GLUCOSE UR STRIP.AUTO-MCNC: NEGATIVE MG/DL
HCG SERPL QL: NEGATIVE
HCT VFR BLD AUTO: 45.3 % (ref 35–45)
HGB BLD-MCNC: 15.1 G/DL (ref 12–16)
HGB UR QL STRIP: NEGATIVE
KETONES UR QL STRIP.AUTO: NEGATIVE MG/DL
LEUKOCYTE ESTERASE UR QL STRIP.AUTO: NEGATIVE
LIPASE SERPL-CCNC: 103 U/L (ref 73–393)
LYMPHOCYTES # BLD: 2.2 K/UL (ref 0.9–3.6)
LYMPHOCYTES NFR BLD: 25 % (ref 21–52)
MCH RBC QN AUTO: 28.1 PG (ref 24–34)
MCHC RBC AUTO-ENTMCNC: 33.3 G/DL (ref 31–37)
MCV RBC AUTO: 84.2 FL (ref 74–97)
MONOCYTES # BLD: 0.8 K/UL (ref 0.05–1.2)
MONOCYTES NFR BLD: 9 % (ref 3–10)
NEUTS SEG # BLD: 5.5 K/UL (ref 1.8–8)
NEUTS SEG NFR BLD: 64 % (ref 40–73)
NITRITE UR QL STRIP.AUTO: NEGATIVE
PH UR STRIP: 6 [PH] (ref 5–8)
PLATELET # BLD AUTO: 318 K/UL (ref 135–420)
PMV BLD AUTO: 10.6 FL (ref 9.2–11.8)
POTASSIUM SERPL-SCNC: 3.8 MMOL/L (ref 3.5–5.5)
PROT SERPL-MCNC: 6.8 G/DL (ref 6.4–8.2)
PROT UR STRIP-MCNC: NEGATIVE MG/DL
RBC # BLD AUTO: 5.38 M/UL (ref 4.2–5.3)
SODIUM SERPL-SCNC: 141 MMOL/L (ref 136–145)
SP GR UR REFRACTOMETRY: 1.01 (ref 1–1.03)
TROPONIN I SERPL-MCNC: <0.02 NG/ML (ref 0–0.04)
UROBILINOGEN UR QL STRIP.AUTO: 0.2 EU/DL (ref 0.2–1)
WBC # BLD AUTO: 8.7 K/UL (ref 4.6–13.2)

## 2020-02-01 PROCEDURE — 83690 ASSAY OF LIPASE: CPT

## 2020-02-01 PROCEDURE — 99284 EMERGENCY DEPT VISIT MOD MDM: CPT

## 2020-02-01 PROCEDURE — 93970 EXTREMITY STUDY: CPT

## 2020-02-01 PROCEDURE — 93005 ELECTROCARDIOGRAM TRACING: CPT

## 2020-02-01 PROCEDURE — 81003 URINALYSIS AUTO W/O SCOPE: CPT

## 2020-02-01 PROCEDURE — 80053 COMPREHEN METABOLIC PANEL: CPT

## 2020-02-01 PROCEDURE — 74022 RADEX COMPL AQT ABD SERIES: CPT

## 2020-02-01 PROCEDURE — 85025 COMPLETE CBC W/AUTO DIFF WBC: CPT

## 2020-02-01 PROCEDURE — 84703 CHORIONIC GONADOTROPIN ASSAY: CPT

## 2020-02-01 PROCEDURE — 82550 ASSAY OF CK (CPK): CPT

## 2020-02-01 NOTE — ED PROVIDER NOTES
EMERGENCY DEPARTMENT HISTORY AND PHYSICAL EXAM    Date: 2/1/2020  Patient Name: Sissy Carrizales    History of Presenting Illness     Chief Complaint   Patient presents with    Abdominal Pain         History Provided By: Patient and Patient's Mother    9:58 AM  Sissy Carrizales is a 40 y.o. female with PMHX of depression, eosinophilic colitis who presents to the emergency department C/O 3 months of abdominal pain, feeling unwell, decreased appetite, numbness in her legs. Symptoms have been constant and she last saw her doctor about a month ago. She was prescribed prednisone for her eosinophilic colitis but stopped taking it because when she took it it would make her feet turn red and swollen. She is currently not taking any medications. She denies any substance abuse. No prior abdominal surgeries. She also endorses back, chest pressure. PCP: Adriana Gross MD    Current Outpatient Medications   Medication Sig Dispense Refill    amitriptyline (ELAVIL) 25 mg tablet TK 1 T PO BID  2    benztropine (COGENTIN) 0.5 mg tablet TK 1 T PO BID  2    OXcarbazepine (TRILEPTAL) 150 mg tablet TK 1 T PO BID  2    propranolol (INDERAL) 40 mg tablet TK 1 T PO BID  2    QUEtiapine (SEROQUEL) 25 mg tablet TK 1 T PO BID  2    naproxen sodium (ANAPROX) 550 mg tablet TK 1 T PO Q 12 H  5    tiZANidine (ZANAFLEX) 2 mg capsule TK 2 CS PO QID  6    gabapentin (NEURONTIN) 300 mg capsule TK 1 C PO 4 TO 5 TIMES A DAY  5    amitriptyline (ELAVIL) 75 mg tablet Take  by mouth nightly.  CHOLECALCIFEROL, VITAMIN D3, (VITAMIN D3 PO) Take  by mouth.  clonazePAM (KLONOPIN) 0.5 mg tablet Take 0.5 mg by mouth two (2) times a day.          Past History     Past Medical History:  Past Medical History:   Diagnosis Date    Anxiety     Arrhythmia     Chronic pain     Diabetes ([address]     Dysuria     Gastrointestinal disorder     colitis    Gross hematuria     Heart murmur     Hematuria     HPV (human papilloma virus) infection  Hypertension     Hypothyroidism     [facility]     MS (multiple sclerosis) ([facility]     OCD (obsessive compulsive disorder)     Other ill-defined conditions(799.03)     prescription drug addiction    Pain management     Psychiatric disorder     depression, bipolar anxiety, ocd    Rapid heart rate     Seizures (HCC)     Tattoo     Tattoos    Vitamin D deficiency     Vulvar pain        Past Surgical History:  Past Surgical History:   Procedure Laterality Date    HX ORTHOPAEDIC      back surgery, ulnar nerve    HX ORTHOPAEDIC      back surgery x 2    HX OTHER SURGICAL      ulner nerve surgery    HX TUBAL LIGATION         Family History:  Family History   Problem Relation Age of Onset    Diabetes Mother     Cancer Maternal Grandmother     Breast Cancer Maternal Aunt     Thyroid Disease Maternal Aunt        Social History:  Social History     Tobacco Use    Smoking status: Former Smoker     Packs/day: 1.50     Types: Cigarettes     Last attempt to quit: 7/10/2017     Years since quittin.5    Smokeless tobacco: Never Used    Tobacco comment: pt stated she quit 2 weeks ago   Substance Use Topics    Alcohol use: No     Comment: occasionally    Drug use: No       Allergies: Allergies   Allergen Reactions    Ciprofloxacin Other (comments)     Can't tolerated, makes nauseous    Sulfatrim Ds Myalgia    Contrast Dye [Iodine] Palpitations    Sulfa (Sulfonamide Antibiotics) Other (comments)    Sulfa (Sulfonamide Antibiotics) Myalgia    Penicillins Rash         Review of Systems   Review of Systems   Constitutional: Positive for appetite change. Negative for fever. Respiratory: Negative for shortness of breath. Cardiovascular: Positive for chest pain. Gastrointestinal: Positive for abdominal pain. Genitourinary: Negative for difficulty urinating and vaginal bleeding. Musculoskeletal: Positive for back pain. Skin: Positive for color change.    All other systems reviewed and are negative. Physical Exam     Vitals:    02/01/20 0945   BP: 119/77   Pulse: 67   Resp: 20   Temp: 97.2 °F (36.2 °C)   SpO2: 100%   Weight: 79.4 kg (175 lb)   Height: 5' 4\" (1.626 m)     Physical Exam    Nursing notes and vital signs reviewed    Constitutional: Non toxic appearing, disheveled, mild distress  Head: Normocephalic, Atraumatic  Eyes: Pupils are equal, round, and reactive to light, EOMI  Neck: Supple  Cardiovascular: Regular rate and rhythm, no murmurs, rubs, or gallops  Chest: Normal work of breathing and chest excursion bilaterally  Lungs: Clear to ausculation bilaterally  Abdomen: Soft, mild diffuse tenderness without rebound or guarding, non distended, normoactive bowel sounds  Back: No evidence of trauma or deformity  Extremities: No evidence of trauma or deformity, no LE edema, distal neurovascular intact  Skin: Warm and dry, normal cap refill  Neuro: Alert and appropriate, CN intact, normal speech, strength and sensation full and symmetric bilaterally, normal gait, normal coordination  Psychiatric: Flat and depressed mood and affect, no evidence of danger to self or others      Diagnostic Study Results     Labs -     Recent Results (from the past 12 hour(s))   CBC WITH AUTOMATED DIFF    Collection Time: 02/01/20 10:35 AM   Result Value Ref Range    WBC 8.7 4.6 - 13.2 K/uL    RBC 5.38 (H) 4.20 - 5.30 M/uL    HGB 15.1 12.0 - 16.0 g/dL    HCT 45.3 (H) 35.0 - 45.0 %    MCV 84.2 74.0 - 97.0 FL    MCH 28.1 24.0 - 34.0 PG    MCHC 33.3 31.0 - 37.0 g/dL    RDW 13.2 11.6 - 14.5 %    PLATELET 750 077 - 321 K/uL    MPV 10.6 9.2 - 11.8 FL    NEUTROPHILS 64 40 - 73 %    LYMPHOCYTES 25 21 - 52 %    MONOCYTES 9 3 - 10 %    EOSINOPHILS 2 0 - 5 %    BASOPHILS 0 0 - 2 %    ABS. NEUTROPHILS 5.5 1.8 - 8.0 K/UL    ABS. LYMPHOCYTES 2.2 0.9 - 3.6 K/UL    ABS. MONOCYTES 0.8 0.05 - 1.2 K/UL    ABS. EOSINOPHILS 0.2 0.0 - 0.4 K/UL    ABS.  BASOPHILS 0.0 0.0 - 0.1 K/UL    DF AUTOMATED     METABOLIC PANEL, COMPREHENSIVE    Collection Time: 02/01/20 10:35 AM   Result Value Ref Range    Sodium 141 136 - 145 mmol/L    Potassium 3.8 3.5 - 5.5 mmol/L    Chloride 105 100 - 111 mmol/L    CO2 35 (H) 21 - 32 mmol/L    Anion gap 1 (L) 3.0 - 18 mmol/L    Glucose 110 (H) 74 - 99 mg/dL    BUN 6 (L) 7.0 - 18 MG/DL    Creatinine 0.87 0.6 - 1.3 MG/DL    BUN/Creatinine ratio 7 (L) 12 - 20      GFR est AA >60 >60 ml/min/1.73m2    GFR est non-AA >60 >60 ml/min/1.73m2    Calcium 9.0 8.5 - 10.1 MG/DL    Bilirubin, total 1.1 (H) 0.2 - 1.0 MG/DL    ALT (SGPT) 29 13 - 56 U/L    AST (SGOT) 19 10 - 38 U/L    Alk.  phosphatase 97 45 - 117 U/L    Protein, total 6.8 6.4 - 8.2 g/dL    Albumin 3.7 3.4 - 5.0 g/dL    Globulin 3.1 2.0 - 4.0 g/dL    A-G Ratio 1.2 0.8 - 1.7     CARDIAC PANEL,(CK, CKMB & TROPONIN)    Collection Time: 02/01/20 10:35 AM   Result Value Ref Range    CK 36 26 - 192 U/L    CK - MB <1.0 <3.6 ng/ml    CK-MB Index  0.0 - 4.0 %     CALCULATION NOT PERFORMED WHEN RESULT IS BELOW LINEAR LIMIT    Troponin-I, QT <0.02 0.0 - 0.045 NG/ML   LIPASE    Collection Time: 02/01/20 10:35 AM   Result Value Ref Range    Lipase 103 73 - 393 U/L   HCG QL SERUM    Collection Time: 02/01/20 10:35 AM   Result Value Ref Range    HCG, Ql. NEGATIVE  NEG     EKG, 12 LEAD, INITIAL    Collection Time: 02/01/20 10:56 AM   Result Value Ref Range    Ventricular Rate 65 BPM    Atrial Rate 65 BPM    P-R Interval 188 ms    QRS Duration 78 ms    Q-T Interval 392 ms    QTC Calculation (Bezet) 407 ms    Calculated P Axis 63 degrees    Calculated R Axis 33 degrees    Calculated T Axis 54 degrees    Diagnosis       Normal sinus rhythm  Possible Left atrial enlargement  Low voltage QRS  Borderline ECG  When compared with ECG of 02-OCT-2019 16:08,  No significant change was found     URINALYSIS W/ RFLX MICROSCOPIC    Collection Time: 02/01/20 11:35 AM   Result Value Ref Range    Color YELLOW      Appearance CLEAR      Specific gravity 1.011 1.005 - 1.030      pH (UA) 6.0 5.0 - 8.0      Protein NEGATIVE  NEG mg/dL    Glucose NEGATIVE  NEG mg/dL    Ketone NEGATIVE  NEG mg/dL    Bilirubin NEGATIVE  NEG      Blood NEGATIVE  NEG      Urobilinogen 0.2 0.2 - 1.0 EU/dL    Nitrites NEGATIVE  NEG      Leukocyte Esterase NEGATIVE  NEG         Radiologic Studies -   XR ABD ACUTE W 1 V CHEST   Final Result   IMPRESSION:      Unremarkable acute abdominal series. CT Results  (Last 48 hours)    None        CXR Results  (Last 48 hours)               02/01/20 1137  XR ABD ACUTE W 1 V CHEST Final result    Impression:  IMPRESSION:       Unremarkable acute abdominal series. Narrative:  EXAM:  Acute Abdominal Series       CLINICAL INDICATION: abd pain   -Additional: None       COMPARISON: Chest x-ray from 10/02/19, CT abdomen and pelvis from 08/10/19       TECHNIQUE:  Supine and upright abdomen, upright chest        _______________       FINDINGS: The bowel gas pattern is unremarkable. There is no evidence of bowel   obstruction or ileus. No soft tissue masses are identified. No organomegaly is   present. There are no worrisome calcifications anywhere in the abdomen or   pelvis. There is no free air seen on the upright view. The cardiomediastinal   silhouette is normal. The lungs are clear.       _______________                 Medications given in the ED-  Medications - No data to display      Medical Decision Making   I am the first provider for this patient. I reviewed the vital signs, available nursing notes, past medical history, past surgical history, family history and social history. Vital Signs-Reviewed the patient's vital signs.     EKG interpretation: (Preliminary)  EKG read by Dr. Martita Turner at 11:02 AM  Normal sinus rhythm at a rate of 65 bpm, [place] interval of 180 ms, QRS duration of 78 ms    Records Reviewed: Nursing Notes, Old Medical Records and Previous electrocardiograms    Provider Notes (Medical Decision Making): Samson Murcia is a 40 y.o. female presenting for 3 months of chronic complaints. No acute abnormalities identified on exam, labs, imaging. Provided patient with reassurance and encouraged her to follow-up with her primary care doctor and her specialist regarding her concerns. Discharged with return precautions. Procedures:  Procedures    ED Course:   10:08 AM  Called back to room by patient who wanted me to look at her feet again. Feet continue to look normal with good pulses and no evidence of edema but patient is very concerned she could have a blood clot. Will order Doppler. 11:58 AM  Updated patient on all results and plan. All questions answered. PROCEDURE NOTE - RECTAL EXAM:   12:23 PM  Chaperoned by: Susan NORMAN  Rectal exam performed. No stool was in the vault to becollected. No visible or palpable abnormalities noted. Diagnosis and Disposition     Critical Care: None    DISCHARGE NOTE:    Callie Alonso's  results have been reviewed with her. She has been counseled regarding her diagnosis, treatment, and plan. She verbally conveys understanding and agreement of the signs, symptoms, diagnosis, treatment and prognosis and additionally agrees to follow up as discussed. She also agrees with the care-plan and conveys that all of her questions have been answered. I have also provided discharge instructions for her that include: educational information regarding their diagnosis and treatment, and list of reasons why they would want to return to the ED prior to their follow-up appointment, should her condition change. She has been provided with education for proper emergency department utilization. CLINICAL IMPRESSION:    1. Chronic abdominal pain        PLAN:  1. D/C Home  2. Current Discharge Medication List        3.    Follow-up Information     Follow up With Specialties Details Why Contact Avni Fong MD Internal Medicine Schedule an appointment as soon as possible for a visit  [address] [place] [address] [place] [address]      [facility] EMERGENCY DEPT Emergency Medicine  If symptoms worsen [address] [place] [id number]  [phone]        _______________________________      Please note that this dictation was completed with [employer], the computer voice recognition software. Quite often unanticipated grammatical, syntax, homophones, and other interpretive errors are inadvertently transcribed by the computer software. Please disregard these errors. Please excuse any errors that have escaped final proofreading.

## 2020-02-01 NOTE — DISCHARGE INSTRUCTIONS

## 2020-02-01 NOTE — ED TRIAGE NOTES
Patient with chronic stomach issues and is having abdominal cramping, lack of appetite, weight loss and numbness to his leg.  Patient was sent here by GI

## 2020-02-01 NOTE — ED NOTES
I have reviewed discharge instructions with the patient. The patient verbalized understanding.   Arm removed and placed in shred it

## 2020-02-01 NOTE — ED NOTES
Patient Instructed to give a urine sample.  States I don't have to go  pateint having a duplex lower ext venous bilat  Once completed will complete assessment and do labs and ekg

## 2020-02-02 LAB
ATRIAL RATE: 65 BPM
CALCULATED P AXIS, ECG09: 63 DEGREES
CALCULATED R AXIS, ECG10: 33 DEGREES
CALCULATED T AXIS, ECG11: 54 DEGREES
DIAGNOSIS, 93000: NORMAL
P-R INTERVAL, ECG05: 188 MS
Q-T INTERVAL, ECG07: 392 MS
QRS DURATION, ECG06: 78 MS
QTC CALCULATION (BEZET), ECG08: 407 MS
VENTRICULAR RATE, ECG03: 65 BPM

## 2020-02-24 ENCOUNTER — APPOINTMENT (OUTPATIENT)
Dept: CT IMAGING | Age: 45
End: 2020-02-24
Attending: PHYSICIAN ASSISTANT
Payer: MEDICAID

## 2020-02-24 ENCOUNTER — HOSPITAL ENCOUNTER (EMERGENCY)
Age: 45
Discharge: HOME | End: 2020-02-24
Attending: EMERGENCY MEDICINE
Payer: MEDICAID

## 2020-02-24 VITALS
TEMPERATURE: 98.1 F | RESPIRATION RATE: 16 BRPM | HEIGHT: 64 IN | HEART RATE: 72 BPM | BODY MASS INDEX: 29.02 KG/M2 | DIASTOLIC BLOOD PRESSURE: 74 MMHG | OXYGEN SATURATION: 100 % | SYSTOLIC BLOOD PRESSURE: 111 MMHG | WEIGHT: 170 LBS

## 2020-02-24 DIAGNOSIS — M54.2 NECK PAIN: ICD-10-CM

## 2020-02-24 DIAGNOSIS — G89.29 CHRONIC THORACIC BACK PAIN, UNSPECIFIED BACK PAIN LATERALITY: ICD-10-CM

## 2020-02-24 DIAGNOSIS — M54.6 CHRONIC THORACIC BACK PAIN, UNSPECIFIED BACK PAIN LATERALITY: ICD-10-CM

## 2020-02-24 DIAGNOSIS — J34.89 SINUS PRESSURE: Primary | ICD-10-CM

## 2020-02-24 PROCEDURE — 99283 EMERGENCY DEPT VISIT LOW MDM: CPT

## 2020-02-24 PROCEDURE — 70450 CT HEAD/BRAIN W/O DYE: CPT

## 2020-02-24 NOTE — ED TRIAGE NOTES
Patient reports nasal congestion, sore throat, headache, ear pain. Pt. Has multiple complaints. States symptoms started two weeks ago.

## 2020-02-24 NOTE — DISCHARGE INSTRUCTIONS
Need to follow-up with PCP  Recommended evaluation by ENT physician and orthopedics  Rest  Gentle stretching and massage for neck and back  Consider ice and heat      Chronic sinus pressure     Back Pain: Care Instructions  Your Care Instructions    Back pain has many possible causes. It is often related to problems with muscles and ligaments of the back. It may also be related to problems with the nerves, discs, or bones of the back. Moving, lifting, standing, sitting, or sleeping in an awkward way can strain the back. Sometimes you don't notice the injury until later. Arthritis is another common cause of back pain. Although it may hurt a lot, back pain usually improves on its own within several weeks. Most people recover in 12 weeks or less. Using good home treatment and being careful not to stress your back can help you feel better sooner. Follow-up care is a key part of your treatment and safety. Be sure to make and go to all appointments, and call your doctor if you are having problems. It's also a good idea to know your test results and keep a list of the medicines you take. How can you care for yourself at home? · Sit or lie in positions that are most comfortable and reduce your pain. Try one of these positions when you lie down:  ? Lie on your back with your knees bent and supported by large pillows. ? Lie on the floor with your legs on the seat of a sofa or chair. ? Lie on your side with your knees and hips bent and a pillow between your legs. ? Lie on your stomach if it does not make pain worse. · Do not sit up in bed, and avoid soft couches and twisted positions. Bed rest can help relieve pain at first, but it delays healing. Avoid bed rest after the first day of back pain. · Change positions every 30 minutes. If you must sit for long periods of time, take breaks from sitting. Get up and walk around, or lie in a comfortable position.   · Try using a heating pad on a low or medium setting for 15 to 20 minutes every 2 or 3 hours. Try a warm shower in place of one session with the heating pad. · You can also try an ice pack for 10 to 15 minutes every 2 to 3 hours. Put a thin cloth between the ice pack and your skin. · Take pain medicines exactly as directed. ? If the doctor gave you a prescription medicine for pain, take it as prescribed. ? If you are not taking a prescription pain medicine, ask your doctor if you can take an over-the-counter medicine. · Take short walks several times a day. You can start with 5 to 10 minutes, 3 or 4 times a day, and work up to longer walks. Walk on level surfaces and avoid hills and stairs until your back is better. · Return to work and other activities as soon as you can. Continued rest without activity is usually not good for your back. · To prevent future back pain, do exercises to stretch and strengthen your back and stomach. Learn how to use good posture, safe lifting techniques, and proper body mechanics. When should you call for help? Call your doctor now or seek immediate medical care if:    · You have new or worsening numbness in your legs.     · You have new or worsening weakness in your legs. (This could make it hard to stand up.)     · You lose control of your bladder or bowels.    Watch closely for changes in your health, and be sure to contact your doctor if:    · You have a fever, lose weight, or don't feel well.     · You do not get better as expected. Where can you learn more? Go to http://lashay-marty.info/. Enter V463 in the search box to learn more about \"Back Pain: Care Instructions. \"  Current as of: June 26, 2019  Content Version: 12.2  © 2800-7947 [employer]. Care instructions adapted under license by [employer] (which disclaims liability or warranty for this information).  If you have questions about a medical condition or this instruction, always ask your healthcare professional. [employer] disclaims any warranty or liability for your use of this information. Neck Pain: Care Instructions  Your Care Instructions    You can have neck pain anywhere from the bottom of your head to the top of your shoulders. It can spread to the upper back or arms. Injuries, painting a ceiling, sleeping with your neck twisted, staying in one position for too long, and many other activities can cause neck pain. Most neck pain gets better with home care. Your doctor may recommend medicine to relieve pain or relax your muscles. He or she may suggest exercise and physical therapy to increase flexibility and relieve stress. You may need to wear a special (cervical) collar to support your neck for a day or two. Follow-up care is a key part of your treatment and safety. Be sure to make and go to all appointments, and call your doctor if you are having problems. It's also a good idea to know your test results and keep a list of the medicines you take. How can you care for yourself at home? · Try using a heating pad on a low or medium setting for 15 to 20 minutes every 2 or 3 hours. Try a warm shower in place of one session with the heating pad. · You can also try an ice pack for 10 to 15 minutes every 2 to 3 hours. Put a thin cloth between the ice and your skin. · Take pain medicines exactly as directed. ¨ If the doctor gave you a prescription medicine for pain, take it as prescribed. ¨ If you are not taking a prescription pain medicine, ask your doctor if you can take an over-the-counter medicine. · If your doctor recommends a cervical collar, wear it exactly as directed. When should you call for help? Call your doctor now or seek immediate medical care if:  ? · You have new or worsening numbness in your arms, buttocks or legs. ? · You have new or worsening weakness in your arms or legs. (This could make it hard to stand up.)   ? · You lose control of your bladder or bowels. ? Watch closely for changes in your health, and be sure to contact your doctor if:  ? · Your neck pain is getting worse. ? · You are not getting better after 1 week. ? · You do not get better as expected. Where can you learn more? Go to http://lashay-marty.info/. Enter [id number] in the search box to learn more about \"Neck Pain: Care Instructions. \"  Current as of: March 21, 2017  Content Version: 11.5  © 6895-1953 [employer]. Care instructions adapted under license by [employer] (which disclaims liability or warranty for this information). If you have questions about a medical condition or this instruction, always ask your healthcare professional. [address] any warranty or liability for your use of this information.

## 2020-02-24 NOTE — ED PROVIDER NOTES
EMERGENCY DEPARTMENT HISTORY AND PHYSICAL EXAM    Date: 2/24/2020  Patient Name: Lucia Platt    History of Presenting Illness     Time Seen:11:13 AM    Chief Complaint   Patient presents with    Sore Throat    Chest Congestion       History Provided By: Patient    Additional History (Context):   Lucia Platt is a 40 y.o. female presents emergency room with multiple complaints. Complains of increased sinus pressure and pain. Some postnasal drip. Headache. No dizziness or lightheadedness. Patient states that she has been treated recurrently for sinusitis. However does not appear to be getting better. She is concerned that she may have a \"fungal infection\" up in her sinuses. Has been seen by ENT doctor in the past but not recently. Also has a little bit of a sore throat. Patient also complains of neck and upper back pain. States \"my scapulas are out of place\". Also \"pinching nerves off going down into my arms\". This is been a recurrent problem for this patient in the past and has been seen here in the ER for the same. Also notes \"my clavicles are up too high\". Denies any numbness, tingling or weakness in her upper extremities. Does complain of pain extending down to about her mid back. PCP: Arnoldo Rey MD    Current Outpatient Medications   Medication Sig Dispense Refill    amitriptyline (ELAVIL) 25 mg tablet TK 1 T PO BID  2    benztropine (COGENTIN) 0.5 mg tablet TK 1 T PO BID  2    OXcarbazepine (TRILEPTAL) 150 mg tablet TK 1 T PO BID  2    propranolol (INDERAL) 40 mg tablet TK 1 T PO BID  2    QUEtiapine (SEROQUEL) 25 mg tablet TK 1 T PO BID  2    naproxen sodium (ANAPROX) 550 mg tablet TK 1 T PO Q 12 H  5    tiZANidine (ZANAFLEX) 2 mg capsule TK 2 CS PO QID  6    gabapentin (NEURONTIN) 300 mg capsule TK 1 C PO 4 TO 5 TIMES A DAY  5    amitriptyline (ELAVIL) 75 mg tablet Take  by mouth nightly.  CHOLECALCIFEROL, VITAMIN D3, (VITAMIN D3 PO) Take  by mouth.       clonazePAM (KLONOPIN) 0.5 mg tablet Take 0.5 mg by mouth two (2) times a day. Past History     Past Medical History:  Past Medical History:   Diagnosis Date    Anxiety     Arrhythmia     Chronic pain     Diabetes ([facility] [address]     Dysuria     Gastrointestinal disorder     colitis    Gross hematuria     Heart murmur     Hematuria     HPV (human papilloma virus) infection     Hypertension     Hypothyroidism     Ischemic colitis ([facility] [address]     MS (multiple sclerosis) (HCC)     OCD (obsessive compulsive disorder)     Other ill-defined conditions(799.89)     prescription drug addiction    Pain management     Psychiatric disorder     depression, bipolar anxiety, ocd    Rapid heart rate     Seizures (HCC)     Tattoo     Tattoos    Vitamin D deficiency     Vulvar pain        Past Surgical History:  Past Surgical History:   Procedure Laterality Date    HX ORTHOPAEDIC      back surgery, ulnar nerve    HX ORTHOPAEDIC      back surgery x 2    HX OTHER SURGICAL      ulner nerve surgery    HX TUBAL LIGATION         Family History:  Family History   Problem Relation Age of Onset    Diabetes Mother     Cancer Maternal Grandmother     Breast Cancer Maternal Aunt     Thyroid Disease Maternal Aunt        Social History:  Social History     Tobacco Use    Smoking status: Former Smoker     Packs/day: 1.50     Types: Cigarettes     Last attempt to quit: 7/10/2017     Years since quittin.6    Smokeless tobacco: Never Used    Tobacco comment: pt stated she quit 2 weeks ago   Substance Use Topics    Alcohol use: No     Comment: occasionally    Drug use: No       Allergies:   Allergies   Allergen Reactions    Ciprofloxacin Other (comments)     Can't tolerated, makes nauseous    Sulfatrim Ds Myalgia    Contrast Dye [Iodine] Palpitations    Sulfa (Sulfonamide Antibiotics) Other (comments)    Sulfa (Sulfonamide Antibiotics) Myalgia    Penicillins Rash         Review of Systems   Review of Systems   HENT: Positive for congestion, postnasal drip, sinus pressure, sinus pain and sore throat. Negative for ear pain, nosebleeds, sneezing and trouble swallowing. Respiratory: Negative. Cardiovascular: Negative. Gastrointestinal: Negative. Musculoskeletal: Positive for back pain, neck pain and neck stiffness. Neurological: Positive for headaches. All other systems reviewed and are negative. Physical Exam     Vitals:    20 1058   BP: 111/74   Pulse: 72   Resp: 16   Temp: 98.1 °F (36.7 °C)   SpO2: 100%   Weight: 77.1 kg (170 lb)   Height: 5' 4\" (1.626 m)     Physical Exam  Vitals signs and nursing note reviewed. Constitutional:       General: She is not in acute distress. Appearance: She is well-developed and normal weight. She is not ill-appearing. HENT:      Head: Normocephalic. Right Ear: Tympanic membrane normal.      Left Ear: Tympanic membrane normal.      Nose: Mucosal edema and congestion present. No rhinorrhea. Right Sinus: Maxillary sinus tenderness and frontal sinus tenderness present. Left Sinus: Maxillary sinus tenderness and frontal sinus tenderness present. Mouth/Throat:      Lips: Pink. Mouth: Mucous membranes are moist.      Pharynx: Oropharynx is clear. Tonsils: No tonsillar exudate. Swellin+ on the right. 1+ on the left. Eyes:      Extraocular Movements: Extraocular movements intact. Conjunctiva/sclera: Conjunctivae normal.      Pupils: Pupils are equal, round, and reactive to light. Neck:      Musculoskeletal: Neck supple. Cardiovascular:      Rate and Rhythm: Normal rate and regular rhythm. Heart sounds: Normal heart sounds. Pulmonary:      Effort: Pulmonary effort is normal.      Breath sounds: Normal breath sounds. Musculoskeletal:      Cervical back: She exhibits decreased range of motion, tenderness and pain. She exhibits no bony tenderness. Thoracic back: She exhibits tenderness and pain.  She exhibits normal range of motion, no bony tenderness and no swelling. Comments: Cervical spine -tenderness in the paracervical musculature bilaterally. Adequate range of motion. Tenderness to the scapular regions bilaterally. Unable to appreciate that her scapulas are anatomically in any incorrect position. Good scapular movement with range of motion of both shoulders. Skin:     General: Skin is warm and dry. Neurological:      Mental Status: She is alert and oriented to person, place, and time. Psychiatric:         Mood and Affect: Mood normal.         Behavior: Behavior normal.         Nursing note and vitals reviewed         Diagnostic Study Results     Labs -   No results found for this or any previous visit (from the past 12 hour(s)). Radiologic Studies   CT HEAD WO CONT   Final Result   IMPRESSION:      No evidence for acute infarct, hemorrhage, or mass effect. CT can be negative in   acute setting. CT Results  (Last 48 hours)               02/24/20 1245  CT HEAD WO CONT Final result    Impression:  IMPRESSION:       No evidence for acute infarct, hemorrhage, or mass effect. CT can be negative in   acute setting. Narrative:  CT head without contrast       INDICATION: Severe headache. COMPARISON: None. TECHNIQUE: Axial CT imaging of the head was performed without intravenous   contrast. One or more dose reduction techniques were used on this CT: automated   exposure control, adjustment of the mAs and/or kVp according to patient size,   and iterative reconstruction techniques. The specific techniques used on this   CT exam have been documented in the patient's electronic medical record. Digital   Imaging and Communications in Medicine (DICOM) format image data are available   to nonaffiliated external healthcare facilities or entities on a secure, media   free, reciprocally searchable basis with patient authorization for at least a   12-month period after this study. _______________       FINDINGS:       BRAIN AND POSTERIOR FOSSA: The sulci, folia, ventricles and basal cisterns are   within normal limits for the patient's age. There is no intracranial hemorrhage,   mass effect, or midline shift. There are no areas of abnormal parenchymal   attenuation. EXTRA-AXIAL SPACES AND MENINGES: There are no abnormal extra-axial fluid   collections. CALVARIUM: Intact. SINUSES: Clear. OTHER: None.       _______________               CXR Results  (Last 48 hours)    None            Medical Decision Making   I am the first provider for this patient. I reviewed the vital signs, available nursing notes, past medical history, past surgical history, family history and social history. Vital Signs-Reviewed the patient's vital signs. Records Reviewed: Nursing Notes    DDX: Chronic sinus pressure pain/headache, chronic musculoskeletal back pain    Provider Notes:   40 y.o. female   Opted to scan her head just to make sure nothing significant going on. Radiologist read as negative in sinus disease otherwise negative overall. Informed patient of her results. At this point, feel like she would benefit from seeing ENT physician in case they wanted to a scope or further evaluation on her sinuses. I recommended no antibiotics at this point. Possibly the use of nasal spray or rinses. As far as her musculoskeletal pains are concerned, patient has been seen in this ER multiple times for the same in the past.  Again recommended following up with her PCP and possibly even a specialist to better evaluate her chronic neck and back pains. May ultimately require MRI but not an emergency today. Procedures:  Procedures    ED Course:   Initial assessment performed. The patients presenting problems have been discussed, and they are in agreement with the care plan formulated and outlined with them.   I have encouraged them to ask questions as they arise throughout their visit.      Diagnosis and Disposition       DISCHARGE NOTE:  Ryan Alonso's  results have been reviewed with her. She has been counseled regarding her diagnosis, treatment, and plan. She verbally conveys understanding and agreement of the signs, symptoms, diagnosis, treatment and prognosis and additionally agrees to follow up as discussed. She also agrees with the care-plan and conveys that all of her questions have been answered. I have also provided discharge instructions for her that include: educational information regarding their diagnosis and treatment, and list of reasons why they would want to return to the ED prior to their follow-up appointment, should her condition change. She has been provided with education for proper emergency department utilization. CLINICAL IMPRESSION:    1. Sinus pressure    2. Neck pain    3. Chronic thoracic back pain, unspecified back pain laterality        PLAN:  1. D/C Home  2. Discharge Medication List as of 2/24/2020  1:12 PM        3. Follow-up Information     Follow up With Specialties Details Why Contact Info    Poppy Swanson MD Internal Medicine Call Call your PCP for follow-up this week [address] [place] [address] EMERGENCY DEPT Emergency Medicine  If symptoms worsen, As needed [address] [id number]  [phone]        ____________________________________     Please note that this dictation was completed with [employer], the computer voice recognition software. Quite often unanticipated grammatical, syntax, homophones, and other interpretive errors are inadvertently transcribed by the computer software. Please disregard these errors. Please excuse any errors that have escaped final proofreading.

## 2020-03-20 ENCOUNTER — APPOINTMENT (OUTPATIENT)
Dept: CT IMAGING | Age: 45
End: 2020-03-20
Attending: EMERGENCY MEDICINE
Payer: MEDICAID

## 2020-03-20 ENCOUNTER — HOSPITAL ENCOUNTER (EMERGENCY)
Age: 45
Discharge: HOME | End: 2020-03-21
Attending: EMERGENCY MEDICINE
Payer: MEDICAID

## 2020-03-20 DIAGNOSIS — J32.9 CHRONIC SINUSITIS, UNSPECIFIED LOCATION: Primary | ICD-10-CM

## 2020-03-20 DIAGNOSIS — H10.31 ACUTE BACTERIAL CONJUNCTIVITIS OF RIGHT EYE: ICD-10-CM

## 2020-03-20 LAB
ANION GAP SERPL CALC-SCNC: 6 MMOL/L (ref 3–18)
APPEARANCE UR: CLEAR
BACTERIA URNS QL MICRO: ABNORMAL /HPF
BASOPHILS # BLD: 0 K/UL (ref 0–0.1)
BASOPHILS NFR BLD: 0 % (ref 0–3)
BILIRUB UR QL: NEGATIVE
BUN SERPL-MCNC: 6 MG/DL (ref 7–18)
BUN/CREAT SERPL: 9 (ref 12–20)
CALCIUM SERPL-MCNC: 8.5 MG/DL (ref 8.5–10.1)
CHLORIDE SERPL-SCNC: 106 MMOL/L (ref 100–111)
CO2 SERPL-SCNC: 28 MMOL/L (ref 21–32)
COLOR UR: YELLOW
CREAT SERPL-MCNC: 0.66 MG/DL (ref 0.6–1.3)
DIFFERENTIAL METHOD BLD: NORMAL
EOSINOPHIL # BLD: 0.4 K/UL (ref 0–0.4)
EOSINOPHIL NFR BLD: 3 % (ref 0–5)
EPITH CASTS URNS QL MICRO: ABNORMAL /LPF (ref 0–5)
ERYTHROCYTE [DISTWIDTH] IN BLOOD BY AUTOMATED COUNT: 13 % (ref 11.6–14.5)
FLUAV AG NPH QL IA: NEGATIVE
FLUBV AG NOSE QL IA: NEGATIVE
GLUCOSE SERPL-MCNC: 83 MG/DL (ref 74–99)
GLUCOSE UR STRIP.AUTO-MCNC: NEGATIVE MG/DL
HCG UR QL: NEGATIVE
HCT VFR BLD AUTO: 37.9 % (ref 35–45)
HGB BLD-MCNC: 12.6 G/DL (ref 12–16)
HGB UR QL STRIP: ABNORMAL
KETONES UR QL STRIP.AUTO: 80 MG/DL
LEUKOCYTE ESTERASE UR QL STRIP.AUTO: ABNORMAL
LYMPHOCYTES # BLD: 3.4 K/UL (ref 0.8–3.5)
LYMPHOCYTES NFR BLD: 28 % (ref 20–51)
MCH RBC QN AUTO: 27.9 PG (ref 24–34)
MCHC RBC AUTO-ENTMCNC: 33.2 G/DL (ref 31–37)
MCV RBC AUTO: 84 FL (ref 74–97)
MONOCYTES # BLD: 0.7 K/UL (ref 0–1)
MONOCYTES NFR BLD: 6 % (ref 2–9)
NEUTS SEG # BLD: 7.5 K/UL (ref 1.8–8)
NEUTS SEG NFR BLD: 63 % (ref 42–75)
NITRITE UR QL STRIP.AUTO: NEGATIVE
PH UR STRIP: 5 [PH] (ref 5–8)
PLATELET # BLD AUTO: 268 K/UL (ref 135–420)
PLATELET COMMENTS,PCOM: NORMAL
PMV BLD AUTO: 10.7 FL (ref 9.2–11.8)
POTASSIUM SERPL-SCNC: 3.2 MMOL/L (ref 3.5–5.5)
PROT UR STRIP-MCNC: NEGATIVE MG/DL
RBC # BLD AUTO: 4.51 M/UL (ref 4.2–5.3)
RBC #/AREA URNS HPF: ABNORMAL /HPF (ref 0–5)
RBC MORPH BLD: NORMAL
S PYO AG THROAT QL: NEGATIVE
SODIUM SERPL-SCNC: 140 MMOL/L (ref 136–145)
SP GR UR REFRACTOMETRY: 1.01 (ref 1–1.03)
UROBILINOGEN UR QL STRIP.AUTO: 1 EU/DL (ref 0.2–1)
WBC # BLD AUTO: 12 K/UL (ref 4.6–13.2)
WBC URNS QL MICRO: ABNORMAL /HPF (ref 0–5)

## 2020-03-20 PROCEDURE — 81001 URINALYSIS AUTO W/SCOPE: CPT

## 2020-03-20 PROCEDURE — 81025 URINE PREGNANCY TEST: CPT

## 2020-03-20 PROCEDURE — 74011000258 HC RX REV CODE- 258: Performed by: EMERGENCY MEDICINE

## 2020-03-20 PROCEDURE — 87880 STREP A ASSAY W/OPTIC: CPT

## 2020-03-20 PROCEDURE — 74011250637 HC RX REV CODE- 250/637: Performed by: EMERGENCY MEDICINE

## 2020-03-20 PROCEDURE — 87804 INFLUENZA ASSAY W/OPTIC: CPT

## 2020-03-20 PROCEDURE — 96375 TX/PRO/DX INJ NEW DRUG ADDON: CPT

## 2020-03-20 PROCEDURE — 74011636320 HC RX REV CODE- 636/320: Performed by: EMERGENCY MEDICINE

## 2020-03-20 PROCEDURE — 85025 COMPLETE CBC W/AUTO DIFF WBC: CPT

## 2020-03-20 PROCEDURE — 74011250636 HC RX REV CODE- 250/636: Performed by: EMERGENCY MEDICINE

## 2020-03-20 PROCEDURE — 96361 HYDRATE IV INFUSION ADD-ON: CPT

## 2020-03-20 PROCEDURE — 70480 CT ORBIT/EAR/FOSSA W/O DYE: CPT

## 2020-03-20 PROCEDURE — 87070 CULTURE OTHR SPECIMN AEROBIC: CPT

## 2020-03-20 PROCEDURE — 99284 EMERGENCY DEPT VISIT MOD MDM: CPT

## 2020-03-20 PROCEDURE — 96374 THER/PROPH/DIAG INJ IV PUSH: CPT

## 2020-03-20 PROCEDURE — 70486 CT MAXILLOFACIAL W/O DYE: CPT

## 2020-03-20 PROCEDURE — 80048 BASIC METABOLIC PNL TOTAL CA: CPT

## 2020-03-20 PROCEDURE — 70491 CT SOFT TISSUE NECK W/DYE: CPT

## 2020-03-20 RX ORDER — DEXAMETHASONE SODIUM PHOSPHATE 4 MG/ML
16 INJECTION, SOLUTION INTRA-ARTICULAR; INTRALESIONAL; INTRAMUSCULAR; INTRAVENOUS; SOFT TISSUE ONCE
Status: DISCONTINUED | OUTPATIENT
Start: 2020-03-20 | End: 2020-03-20

## 2020-03-20 RX ORDER — DOXYCYCLINE 100 MG/1
100 CAPSULE ORAL
Status: COMPLETED | OUTPATIENT
Start: 2020-03-20 | End: 2020-03-20

## 2020-03-20 RX ORDER — ERYTHROMYCIN 5 MG/G
OINTMENT OPHTHALMIC
Status: COMPLETED | OUTPATIENT
Start: 2020-03-20 | End: 2020-03-20

## 2020-03-20 RX ORDER — KETOROLAC TROMETHAMINE 15 MG/ML
15 INJECTION, SOLUTION INTRAMUSCULAR; INTRAVENOUS
Status: COMPLETED | OUTPATIENT
Start: 2020-03-20 | End: 2020-03-20

## 2020-03-20 RX ADMIN — SODIUM CHLORIDE 1000 ML: 900 INJECTION, SOLUTION INTRAVENOUS at 21:07

## 2020-03-20 RX ADMIN — IOPAMIDOL 100 ML: 612 INJECTION, SOLUTION INTRAVENOUS at 22:18

## 2020-03-20 RX ADMIN — DEXAMETHASONE SODIUM PHOSPHATE 16 MG: 4 INJECTION, SOLUTION INTRAMUSCULAR; INTRAVENOUS at 21:32

## 2020-03-20 RX ADMIN — ERYTHROMYCIN: 5 OINTMENT OPHTHALMIC at 22:43

## 2020-03-20 RX ADMIN — KETOROLAC TROMETHAMINE 15 MG: 15 INJECTION, SOLUTION INTRAMUSCULAR; INTRAVENOUS at 21:35

## 2020-03-20 RX ADMIN — DOXYCYCLINE 100 MG: 100 CAPSULE ORAL at 22:43

## 2020-03-21 VITALS
SYSTOLIC BLOOD PRESSURE: 134 MMHG | RESPIRATION RATE: 18 BRPM | OXYGEN SATURATION: 99 % | DIASTOLIC BLOOD PRESSURE: 82 MMHG | TEMPERATURE: 98.3 F | HEIGHT: 64 IN | BODY MASS INDEX: 28.51 KG/M2 | WEIGHT: 167 LBS | HEART RATE: 89 BPM

## 2020-03-21 PROBLEM — J32.9 CHRONIC SINUSITIS: Status: ACTIVE | Noted: 2020-03-21

## 2020-03-21 PROBLEM — H10.31 ACUTE BACTERIAL CONJUNCTIVITIS OF RIGHT EYE: Status: ACTIVE | Noted: 2020-03-21

## 2020-03-21 PROCEDURE — 74011000250 HC RX REV CODE- 250: Performed by: EMERGENCY MEDICINE

## 2020-03-21 PROCEDURE — 96375 TX/PRO/DX INJ NEW DRUG ADDON: CPT

## 2020-03-21 PROCEDURE — 74011250636 HC RX REV CODE- 250/636: Performed by: EMERGENCY MEDICINE

## 2020-03-21 RX ORDER — LORATADINE AND PSEUDOEPHEDRINE SULFATE 5; 120 MG/1; MG/1
1 TABLET, EXTENDED RELEASE ORAL 2 TIMES DAILY
Qty: 20 TAB | Refills: 0 | Status: SHIPPED | OUTPATIENT
Start: 2020-03-21 | End: 2020-03-31

## 2020-03-21 RX ORDER — NAPROXEN 500 MG/1
500 TABLET ORAL
Qty: 20 TAB | Refills: 0 | Status: SHIPPED | OUTPATIENT
Start: 2020-03-21 | End: 2020-03-31

## 2020-03-21 RX ORDER — CEFPODOXIME PROXETIL 200 MG/1
200 TABLET, FILM COATED ORAL 2 TIMES DAILY
Qty: 14 TAB | Refills: 0 | Status: SHIPPED | OUTPATIENT
Start: 2020-03-21 | End: 2020-03-28

## 2020-03-21 RX ORDER — MOXIFLOXACIN HYDROCHLORIDE 400 MG/1
400 TABLET ORAL DAILY
Qty: 5 TAB | Refills: 0 | Status: SHIPPED | OUTPATIENT
Start: 2020-03-21 | End: 2020-03-21

## 2020-03-21 RX ORDER — ERYTHROMYCIN 5 MG/G
1 OINTMENT OPHTHALMIC EVERY 6 HOURS
Qty: 28 TUBE | Refills: 0 | Status: SHIPPED | OUTPATIENT
Start: 2020-03-21 | End: 2020-03-28

## 2020-03-21 RX ADMIN — CEFTRIAXONE 1 G: 1 INJECTION, POWDER, FOR SOLUTION INTRAMUSCULAR; INTRAVENOUS at 00:35

## 2020-03-21 NOTE — DISCHARGE INSTRUCTIONS
Please try to stay hydrated with fluids and popsicles. Please take antibiotics as prescribed. You should call your primary care doctor on Monday morning to check in. Return to the emergency department if you cannot keep down fluids or you spike a fever.

## 2020-03-21 NOTE — ED NOTES
Reviewing d/c instructions with pt who states \" I don't think I can take the Moxifloxacin\" pt reports \"its going to make my joints ache\" MD informed of pts concerns and is bedside with pt.

## 2020-03-21 NOTE — ED PROVIDER NOTES
EMERGENCY DEPARTMENT HISTORY AND PHYSICAL EXAM    Date: 3/20/2020  Patient Name: Lucia Platt    History of Presenting Illness     Chief Complaint   Patient presents with    Cough    Sore Throat         History Provided By: Patient    2105  Lucia Platt is a 40 y.o. female with PMHX of HTN, MS, bipolar disorder, depression, seizures, anxiety who presents to the emergency department C/O throat pain. Patient reports she has been having chronic sinus pain and congestion for over a month. Reports over the past 3 to 4 days worsening throat pain and hoarse voice. States difficulty eating and drinking secondary to pain and nonproductive cough. Patient also states facial pressure, ear pain, and drainage from right eye. Says she has been dealing with sinus and congestion issues for over a month. Initially took a course of antibiotics, azithromycin, without significant relief. She states she has been taking old antibiotics at home such as doxycycline but is not taken a full course of them. Patient has been seen by Dr. Fabien Torres and she says that she has imaging studies prescribed but has been unable to get them done because she has been sick    PCP: Arnoldo Rey MD    Current Outpatient Medications   Medication Sig Dispense Refill    naproxen (Naprosyn) 500 mg tablet Take 1 Tab by mouth two (2) times daily as needed for Pain for up to 10 days. 20 Tab 0    moxifloxacin (AVELOX) 400 mg tablet Take 1 Tab by mouth daily for 5 days. 5 Tab 0    loratadine-pseudoephedrine (Claritin-D 12 Hour) 5-120 mg per tablet Take 1 Tab by mouth two (2) times a day for 10 days. Indications: cold symptoms, stuffy nose, seasonal runny nose 20 Tab 0    benzocaine-menthoL (Chloraseptic Max) 15-10 mg lozg lozenge Take 1 Lozenge by mouth every two (2) hours as needed for Sore throat. 20 Lozenge 0    erythromycin (ILOTYCIN) ophthalmic ointment Administer 1 g to right eye every six (6) hours for 7 days.  28 Tube 0    clonazePAM (KLONOPIN) 0.5 mg tablet Take 0.5 mg by mouth two (2) times a day.  amitriptyline (ELAVIL) 25 mg tablet TK 1 T PO BID  2    benztropine (COGENTIN) 0.5 mg tablet TK 1 T PO BID  2    OXcarbazepine (TRILEPTAL) 150 mg tablet TK 1 T PO BID  2    propranolol (INDERAL) 40 mg tablet TK 1 T PO BID  2    QUEtiapine (SEROQUEL) 25 mg tablet TK 1 T PO BID  2    naproxen sodium (ANAPROX) 550 mg tablet TK 1 T PO Q 12 H  5    tiZANidine (ZANAFLEX) 2 mg capsule TK 2 CS PO QID  6    gabapentin (NEURONTIN) 300 mg capsule TK 1 C PO 4 TO 5 TIMES A DAY  5    amitriptyline (ELAVIL) 75 mg tablet Take  by mouth nightly.  CHOLECALCIFEROL, VITAMIN D3, (VITAMIN D3 PO) Take  by mouth.          Past History     Past Medical History:  Past Medical History:   Diagnosis Date    Anxiety     Arrhythmia     Chronic pain     Dysuria     Gastrointestinal disorder     colitis    Gross hematuria     Heart murmur     Hematuria     HPV (human papilloma virus) infection     Hypertension     Hypothyroidism     Ischemic colitis ([facility] [address]     MS (multiple sclerosis) (HCC)     OCD (obsessive compulsive disorder)     Other ill-defined conditions(799.89)     prescription drug addiction    Pain management     Psychiatric disorder     depression, bipolar anxiety, ocd    Rapid heart rate     Seizures (HCC)     Tattoo     Tattoos    Vitamin D deficiency     Vulvar pain        Past Surgical History:  Past Surgical History:   Procedure Laterality Date    HX ORTHOPAEDIC      back surgery, ulnar nerve    HX ORTHOPAEDIC      back surgery x 2    HX OTHER SURGICAL      ulner nerve surgery    HX TUBAL LIGATION         Family History:  Family History   Problem Relation Age of Onset    Diabetes Mother     Cancer Maternal Grandmother     Breast Cancer Maternal Aunt     Thyroid Disease Maternal Aunt        Social History:  Social History     Tobacco Use    Smoking status: Former Smoker     Packs/day: 1.50     Types: Cigarettes     Last attempt to quit: 7/10/2017     Years since quittin.6    Smokeless tobacco: Never Used    Tobacco comment: pt stated she quit 2 weeks ago   Substance Use Topics    Alcohol use: No     Comment: occasionally    Drug use: No       Allergies: Allergies   Allergen Reactions    Ciprofloxacin Other (comments)     Can't tolerated, makes nauseous    Sulfatrim Ds Myalgia    Contrast Dye [Iodine] Palpitations    Sulfa (Sulfonamide Antibiotics) Other (comments)    Sulfa (Sulfonamide Antibiotics) Myalgia    Penicillins Rash         Review of Systems   Review of Systems   Constitutional: Positive for activity change, appetite change, chills and fatigue. HENT: Positive for congestion, ear pain, sinus pressure, sinus pain, sore throat, trouble swallowing and voice change. Eyes: Positive for discharge. Respiratory: Positive for cough. Negative for shortness of breath. Cardiovascular: Negative for chest pain. Gastrointestinal: Negative for constipation, nausea and vomiting. All other systems reviewed and are negative. Physical Exam     Vitals:    20 2129 20 2130 20 2237 20 0042   BP:  113/61 129/66 134/82   Pulse: 99   89   Resp:       Temp:    98.3 °F (36.8 °C)   SpO2:       Weight:       Height:         Physical Exam  Vitals signs and nursing note reviewed. Constitutional:       General: She is not in acute distress. Appearance: She is well-developed. She is not toxic-appearing. HENT:      Head: Normocephalic and atraumatic. Mouth/Throat:      Mouth: Mucous membranes are dry. Pharynx: Uvula midline. Posterior oropharyngeal erythema present. No oropharyngeal exudate or uvula swelling. Tonsils: Tonsillar exudate present. No tonsillar abscesses. Eyes:      Conjunctiva/sclera:      Right eye: Right conjunctiva is injected. Exudate (yellow mucoid discharge) present. Pupils: Pupils are equal, round, and reactive to light.    Neck: Musculoskeletal: Full passive range of motion without pain, normal range of motion and neck supple. Cardiovascular:      Rate and Rhythm: Normal rate and regular rhythm. Heart sounds: Normal heart sounds. Pulmonary:      Effort: Pulmonary effort is normal. No respiratory distress. Breath sounds: Normal breath sounds. No wheezing or rales. Chest:      Chest wall: No tenderness. Abdominal:      General: There is no distension. Palpations: Abdomen is soft. Tenderness: There is no abdominal tenderness. There is no guarding or rebound. Musculoskeletal: Normal range of motion. General: No tenderness. Lymphadenopathy:      Cervical: No cervical adenopathy. Skin:     General: Skin is warm and dry. Neurological:      Mental Status: She is alert and oriented to person, place, and time. Cranial Nerves: No cranial nerve deficit. Motor: No abnormal muscle tone. Coordination: Coordination normal.   Psychiatric:         Mood and Affect: Mood is anxious. Behavior: Behavior normal.           Diagnostic Study Results     Labs -     Recent Results (from the past 12 hour(s))   INFLUENZA A & B AG (RAPID TEST)    Collection Time: 03/20/20  8:50 PM   Result Value Ref Range    Influenza A Antigen NEGATIVE  NEG      Influenza B Antigen NEGATIVE  NEG     POC GROUP A STREP    Collection Time: 03/20/20  9:00 PM   Result Value Ref Range    Group A strep (POC) NEGATIVE  NEG     CBC WITH AUTOMATED DIFF    Collection Time: 03/20/20  9:05 PM   Result Value Ref Range    WBC 12.0 4.6 - 13.2 K/uL    RBC 4.51 4.20 - 5.30 M/uL    HGB 12.6 12.0 - 16.0 g/dL    HCT 37.9 35.0 - 45.0 %    MCV 84.0 74.0 - 97.0 FL    MCH 27.9 24.0 - 34.0 PG    MCHC 33.2 31.0 - 37.0 g/dL    RDW 13.0 11.6 - 14.5 %    PLATELET 551 821 - 849 K/uL    MPV 10.7 9.2 - 11.8 FL    NEUTROPHILS 63 42 - 75 %    LYMPHOCYTES 28 20 - 51 %    MONOCYTES 6 2 - 9 %    EOSINOPHILS 3 0 - 5 %    BASOPHILS 0 0 - 3 %    ABS. NEUTROPHILS 7.5 1.8 - 8.0 K/UL    ABS. LYMPHOCYTES 3.4 0.8 - 3.5 K/UL    ABS. MONOCYTES 0.7 0 - 1.0 K/UL    ABS. EOSINOPHILS 0.4 0.0 - 0.4 K/UL    ABS. BASOPHILS 0.0 0.0 - 0.1 K/UL    PLATELET COMMENTS ADEQUATE PLATELETS      RBC COMMENTS NORMOCYTIC, NORMOCHROMIC      DF MANUAL     METABOLIC PANEL, BASIC    Collection Time: 03/20/20  9:05 PM   Result Value Ref Range    Sodium 140 136 - 145 mmol/L    Potassium 3.2 (L) 3.5 - 5.5 mmol/L    Chloride 106 100 - 111 mmol/L    CO2 28 21 - 32 mmol/L    Anion gap 6 3.0 - 18 mmol/L    Glucose 83 74 - 99 mg/dL    BUN 6 (L) 7.0 - 18 MG/DL    Creatinine 0.66 0.6 - 1.3 MG/DL    BUN/Creatinine ratio 9 (L) 12 - 20      GFR est AA >60 >60 ml/min/1.73m2    GFR est non-AA >60 >60 ml/min/1.73m2    Calcium 8.5 8.5 - 10.1 MG/DL   URINALYSIS W/ RFLX MICROSCOPIC    Collection Time: 03/20/20  9:20 PM   Result Value Ref Range    Color YELLOW      Appearance CLEAR      Specific gravity 1.014 1.005 - 1.030      pH (UA) 5.0 5.0 - 8.0      Protein NEGATIVE  NEG mg/dL    Glucose NEGATIVE  NEG mg/dL    Ketone 80 (A) NEG mg/dL    Bilirubin NEGATIVE  NEG      Blood LARGE (A) NEG      Urobilinogen 1.0 0.2 - 1.0 EU/dL    Nitrites NEGATIVE  NEG      Leukocyte Esterase TRACE (A) NEG     HCG URINE, QL    Collection Time: 03/20/20  9:20 PM   Result Value Ref Range    HCG urine, QL NEGATIVE  NEG     URINE MICROSCOPIC ONLY    Collection Time: 03/20/20  9:20 PM   Result Value Ref Range    WBC 1 to 3 0 - 5 /hpf    RBC 40 to 50 0 - 5 /hpf    Epithelial cells 2+ 0 - 5 /lpf    Bacteria 2+ (A) NEG /hpf       Radiologic Studies -   CT MAXILLOFACIAL WO CONT   Final Result   IMPRESSION:      Acute and/or chronic paranasal sinusitis with air-fluid level in the right   maxillary sinus. Mucoperiosteal thickening of both ostiomeatal units. CT TEMP BONES WO CONT   Final Result   IMPRESSION:      Small amount of fluid in the right epitympanum and mastoid antrum otherwise   unremarkable exam. This is nonspecific.  No erosions seen. CT NECK SOFT TISSUE W CONT   Final Result   IMPRESSION:      Chronic and acute or nasal sinusitis. Highly enlarged right cervical lymph node. CT Results  (Last 48 hours)               03/20/20 2245  CT MAXILLOFACIAL WO CONT Final result    Impression:  IMPRESSION:       Acute and/or chronic paranasal sinusitis with air-fluid level in the right   maxillary sinus. Mucoperiosteal thickening of both ostiomeatal units. Narrative:  EXAM: CT of the maxillofacial       INDICATION: Chronic sinusitis       COMPARISON: None. TECHNIQUE: Axial CT imaging of the maxillofacial was performed without   intravenous contrast. Multiplanar reformats were generated. One or more dose   reduction techniques were used on this CT: automated exposure control,   adjustment of the mAs and/or kVp according to patient size, and iterative   reconstruction techniques. The specific techniques used on this CT exam have   been documented in the patient's electronic medical record. Digital Imaging and   Communications in Medicine (DICOM) format image data are available to   nonaffiliated external healthcare facilities or entities on a secure, media   free, reciprocally searchable basis with patient authorization for at least a   12-month period after this study. _______________       FINDINGS:       There is mucoperiosteal thickening of the ethmoid sinuses suggesting chronic   paranasal sinusitis. Mild mucoperiosteal thickening of the right sphenoid, left   maxillary and right maxillary sinus are seen. There are air-fluid levels in the   right maxillary sinus suggesting superimposed acute sinusitis. Mastoid sinuses   are clear. Dental hardware present limiting evaluation. There is mucoperiosteal   thickening of the ostiomeatal units bilaterally. Visualized brain is unremarkable. Orbits are intact.  Visualized soft tissues the   neck are unremarkable.       _______________           03/20/20 2245  CT TEMP BONES WO CONT Final result    Impression:  IMPRESSION:       Small amount of fluid in the right epitympanum and mastoid antrum otherwise   unremarkable exam. This is nonspecific. No erosions seen. Narrative:  EXAM: CT of the temporal bones without contrast       INDICATION: Ear pain       COMPARISON: None. TECHNIQUE: Axial CT imaging of the temporal bones was performed without   intravenous contrast. Multiplanar reformats were generated. One or more dose   reduction techniques were used on this CT: automated exposure control,   adjustment of the mAs and/or kVp according to patient size, and iterative   reconstruction techniques. The specific techniques used on this CT exam have   been documented in the patient's electronic medical record. Digital Imaging and   Communications in Medicine (DICOM) format image data are available to   nonaffiliated external healthcare facilities or entities on a secure, media   free, reciprocally searchable basis with patient authorization for at least a   12-month period after this study. _______________       FINDINGS:       Right temporal bones: Small amount of fluid is seen in the right epitympanum and   mastoid antrum. No erosions seen. Last majority of the temporal bones and middle   ear are well aerated. Left temporal bones: Mastoid sinuses are well-aerated. No fluid density seen. _______________           03/20/20 2245  CT NECK SOFT TISSUE W CONT Final result    Impression:  IMPRESSION:       Chronic and acute or nasal sinusitis. Highly enlarged right cervical lymph node. Narrative:  EXAM: CT of the soft tissues neck       INDICATION: Weakness, sore throat, persistent cough       COMPARISON: None. TECHNIQUE: Axial CT imaging of the soft tissues neck was performed with   intravenous contrast. Multiplanar reformats were generated.  One or more dose   reduction techniques were used on this CT: automated exposure control,   adjustment of the mAs and/or kVp according to patient size, and iterative   reconstruction techniques. The specific techniques used on this CT exam have   been documented in the patient's electronic medical record. Digital Imaging and   Communications in Medicine (DICOM) format image data are available to   nonaffiliated external healthcare facilities or entities on a secure, media   free, reciprocally searchable basis with patient authorization for at least a   12-month period after this study. _______________       FINDINGS:       Visualized brain is unremarkable. Orbits are intact. Posterior nasopharynx,   oropharynx, hypopharynx, vocal cords and trachea are normal.       Lung apices are clear. Thyroid gland, submandibular glands, and parotid glands   are unremarkable. There are mildly enlarged right jugulodigastric lymph nodes   measuring 13 mm in short axis. Subcentimeter left cervical lymph nodes seen. Lung apices are clear. Bone windows demonstrate multilevel degenerative disc disease with loss of the   normal cervical lordosis.        There is acute and/or chronic paranasal sinusitis.       _______________               CXR Results  (Last 48 hours)    None          Medications given in the ED-  Medications   sodium chloride 0.9 % bolus infusion 1,000 mL (0 mL IntraVENous IV Completed 3/20/20 2204)   ketorolac (TORADOL) injection 15 mg (15 mg IntraVENous Given 3/20/20 2135)   dexamethasone (DECADRON) 16 mg in 0.9% sodium chloride 50 mL IVPB (16 mg IntraVENous Given 3/20/20 2132)   doxycycline (MONODOX) capsule 100 mg (100 mg Oral Given 3/20/20 2243)   erythromycin (ILOTYCIN) 5 mg/gram (0.5 %) ophthalmic ointment ( Both Eyes Given 3/20/20 2243)   iopamidoL (ISOVUE 300) 61 % contrast injection 100 mL (100 mL IntraVENous Given 3/20/20 2218)   cefTRIAXone (ROCEPHIN) 1 g in sterile water (preservative free) 10 mL IV syringe (1 g IntraVENous Given 3/21/20 0035)         Medical Decision Making   I am the first provider for this patient. I reviewed the vital signs, available nursing notes, past medical history, past surgical history, family history and social history. Vital Signs-Reviewed the patient's vital signs. Pulse Oximetry Analysis - 99% on RA         Records Reviewed: Nursing Notes and Old Medical Records    Provider Notes (Medical Decision Making): Caroline Carcamo is a 40 y.o. female presents with congestion, mild sore throat, and conjunctivitis. Patient appears uncomfortable  but nontoxic. We will plan on treating for chronic sinusitis and will give IV fluids and pain relief. She also has studies that have been requested by Dr. Mara Catalan however because of [address] and her general illness she has been unable to get the studies done at an outpatient  Therefore will proceed with advanced imaging for further evaluation to evaluate for PTA or other deep space neck infection    Procedures:  Procedures    ED Course:   12:37 AM  Patient with sinusitis on imaging, she is reluctant to be discharged and says she cannot swallow however patient was able to drink fluids and take the doxycycline pill in the emergency department  She is afebrile here with no leukocytosis. Vital signs have been within normal limits. She was given anti-inflammatories and steroids for throat painand will give patient dose of ceftriaxone here. She reports that she has difficulty with doxycycline in the past and therefore will place patient on moxifloxacin for sinusitis as outpatient. I discussed with patient that she has no findings of a deep space neck infection or other surgical emergency. She is concerned there is tongue swelling however she has absolutely no edema   Patient is concerned that she will have to come back to the emergency department. I discussed with patient that she needs to try her best to stay hydrated with popsicles and fluids and to take the antibiotics as prescribed.   Have also prescribed patient pain relievers medications for congestion. I have instructed her to call her primary care physician and Dr. Kim Allen office on Monday. Patient understands she should return to emergency department she develops a high fever or any difficulty breathing. She should return if she cannot take the antibiotics as prescribed. I have instructed her to try these strategies at home and I think she will do well as long as she is continuing to treat and manage her symptoms. 1:02 AM  Patient reports she is concerned that she will have a bad reaction to moxifloxacin. She looked up on her phone reactions to this medication and is concerned that it will cause joint injury. I discussed this with her and instead will prescribed Cefpodoxime. I told her this can sometimes be hard to find at a pharmacy and if she runs into issues to call the ER and we can prescribe an alternative. I provided her with a [employer] card to assist in her filling the antibiotic. After receiving ceftriaxone she began wringing her hands together and told me her hands were turning red and she was concerned she was having a reaction to it. I discussed that this is a well tolerated medication and that she is not having symptoms of an allergic reaction. Diagnosis and Disposition     Critical Care:     DISCHARGE NOTE:    Abebe Alonso's  results have been reviewed with her. She has been counseled regarding her diagnosis, treatment, and plan. She verbally conveys understanding and agreement of the signs, symptoms, diagnosis, treatment and prognosis and additionally agrees to follow up as discussed. She also agrees with the care-plan and conveys that all of her questions have been answered. I have also provided discharge instructions for her that include: educational information regarding their diagnosis and treatment, and list of reasons why they would want to return to the ED prior to their follow-up appointment, should her condition change.  She has been provided with education for proper emergency department utilization. CLINICAL IMPRESSION:    1. Chronic sinusitis, unspecified location    2. Acute bacterial conjunctivitis of right eye        PLAN:  1. D/C Home  2. Current Discharge Medication List      START taking these medications    Details   naproxen (Naprosyn) 500 mg tablet Take 1 Tab by mouth two (2) times daily as needed for Pain for up to 10 days. Qty: 20 Tab, Refills: 0      moxifloxacin (AVELOX) 400 mg tablet Take 1 Tab by mouth daily for 5 days. Qty: 5 Tab, Refills: 0      loratadine-pseudoephedrine (Claritin-D 12 Hour) 5-120 mg per tablet Take 1 Tab by mouth two (2) times a day for 10 days. Indications: cold symptoms, stuffy nose, seasonal runny nose  Qty: 20 Tab, Refills: 0      benzocaine-menthoL (Chloraseptic Max) 15-10 mg lozg lozenge Take 1 Lozenge by mouth every two (2) hours as needed for Sore throat. Qty: 20 Lozenge, Refills: 0      erythromycin (ILOTYCIN) ophthalmic ointment Administer 1 g to right eye every six (6) hours for 7 days. Qty: 28 Tube, Refills: 0           3. Follow-up Information     Follow up With Specialties Details Why Contact Info    Wan Medina MD Internal Medicine Schedule an appointment as soon as possible for a visit  For primary care follow up [address] [place] [address]      Elmira Lanier MD Otolaryngology Call   [address]  [phone]          _______________________________      Please note that this dictation was completed with [employer], the [employer] voice recognition software. Quite often unanticipated grammatical, syntax, homophones, and other interpretive errors are inadvertently transcribed by the computer software. Please disregard these errors. Please excuse any errors that have escaped final proofreading.

## 2020-03-21 NOTE — ED NOTES
Pt hourly rounding competed. Safety   Pt () resting on stretcher with side rails up and call bell in reach. () in chair    () in parents arms. Toileting   Pt offered ()Bedpan     (X)Assistance to Restroom     ()Urinal  Ongoing Updates  Updated on plan of care and status of test results.   Pain Management  Inquired as to comfort and offered comfort measures:    () warm blankets   () dimmed lights

## 2020-03-23 LAB
BACTERIA SPEC CULT: NORMAL
BACTERIA SPEC CULT: NORMAL
SERVICE CMNT-IMP: NORMAL

## 2020-05-06 ENCOUNTER — HOSPITAL ENCOUNTER (OUTPATIENT)
Dept: MRI IMAGING | Age: 45
Discharge: HOME | End: 2020-05-06
Attending: OTOLARYNGOLOGY
Payer: MEDICAID

## 2020-05-06 VITALS — WEIGHT: 145 LBS | BODY MASS INDEX: 24.89 KG/M2

## 2020-05-06 DIAGNOSIS — R42 DIZZINESS AND GIDDINESS: ICD-10-CM

## 2020-05-06 DIAGNOSIS — J32.9 CHRONIC INFECTION OF SINUS: ICD-10-CM

## 2020-05-06 PROCEDURE — 74011636320 HC RX REV CODE- 636/320: Performed by: OTOLARYNGOLOGY

## 2020-05-06 PROCEDURE — 70553 MRI BRAIN STEM W/O & W/DYE: CPT

## 2020-05-06 PROCEDURE — A9575 INJ GADOTERATE MEGLUMI 0.1ML: HCPCS | Performed by: OTOLARYNGOLOGY

## 2020-05-06 PROCEDURE — 82565 ASSAY OF CREATININE: CPT

## 2020-05-06 RX ADMIN — GADOTERATE MEGLUMINE 15 ML: 376.9 INJECTION INTRAVENOUS at 13:04

## 2020-05-08 LAB — CREAT UR-MCNC: 0.9 MG/DL (ref 0.6–1.3)

## 2020-05-18 ENCOUNTER — HOSPITAL ENCOUNTER (OUTPATIENT)
Dept: LAB | Age: 45
Discharge: HOME | End: 2020-05-18

## 2020-05-18 LAB — SENTARA SPECIMEN COL,SENBCF: NORMAL

## 2020-05-18 PROCEDURE — 99001 SPECIMEN HANDLING PT-LAB: CPT

## 2021-04-13 ENCOUNTER — TRANSCRIBE ORDER (OUTPATIENT)
Dept: SCHEDULING | Age: 46
End: 2021-04-13

## 2021-04-15 ENCOUNTER — TRANSCRIBE ORDER (OUTPATIENT)
Dept: SCHEDULING | Age: 46
End: 2021-04-15

## 2021-04-15 DIAGNOSIS — M75.92 LESION OF LEFT SHOULDER: Primary | ICD-10-CM

## 2021-05-03 ENCOUNTER — TRANSCRIBE ORDER (OUTPATIENT)
Dept: SCHEDULING | Age: 46
End: 2021-05-03

## 2021-05-03 DIAGNOSIS — M75.92 LESION OF LEFT SHOULDER: Primary | ICD-10-CM

## 2021-05-07 ENCOUNTER — HOSPITAL ENCOUNTER (OUTPATIENT)
Dept: CT IMAGING | Age: 46
Discharge: HOME | End: 2021-05-07
Payer: MEDICAID

## 2021-05-07 DIAGNOSIS — M75.92 LESION OF LEFT SHOULDER: ICD-10-CM

## 2021-05-07 PROCEDURE — 73200 CT UPPER EXTREMITY W/O DYE: CPT

## 2021-09-21 ENCOUNTER — APPOINTMENT (OUTPATIENT)
Dept: CT IMAGING | Age: 46
End: 2021-09-21
Attending: EMERGENCY MEDICINE
Payer: MEDICAID

## 2021-09-21 ENCOUNTER — HOSPITAL ENCOUNTER (OUTPATIENT)
Age: 46
Setting detail: OBSERVATION
Discharge: HOME | End: 2021-09-22
Attending: EMERGENCY MEDICINE | Admitting: FAMILY MEDICINE
Payer: MEDICAID

## 2021-09-21 ENCOUNTER — APPOINTMENT (OUTPATIENT)
Dept: GENERAL RADIOLOGY | Age: 46
End: 2021-09-21
Attending: EMERGENCY MEDICINE
Payer: MEDICAID

## 2021-09-21 DIAGNOSIS — R20.0 NUMBNESS AND TINGLING: Primary | ICD-10-CM

## 2021-09-21 DIAGNOSIS — R20.2 NUMBNESS AND TINGLING: Primary | ICD-10-CM

## 2021-09-21 LAB
ANION GAP SERPL CALC-SCNC: 6 MMOL/L (ref 3–18)
BASOPHILS # BLD: 0 K/UL (ref 0–0.1)
BASOPHILS NFR BLD: 1 % (ref 0–2)
BUN SERPL-MCNC: 8 MG/DL (ref 7–18)
BUN/CREAT SERPL: 11 (ref 12–20)
CALCIUM SERPL-MCNC: 8.7 MG/DL (ref 8.5–10.1)
CHLORIDE SERPL-SCNC: 108 MMOL/L (ref 100–111)
CK MB CFR SERPL CALC: NORMAL % (ref 0–4)
CK MB SERPL-MCNC: <1 NG/ML (ref 5–25)
CK SERPL-CCNC: 67 U/L (ref 26–192)
CO2 SERPL-SCNC: 28 MMOL/L (ref 21–32)
CREAT SERPL-MCNC: 0.74 MG/DL (ref 0.6–1.3)
DIFFERENTIAL METHOD BLD: ABNORMAL
EOSINOPHIL # BLD: 0.1 K/UL (ref 0–0.4)
EOSINOPHIL NFR BLD: 1 % (ref 0–5)
ERYTHROCYTE [DISTWIDTH] IN BLOOD BY AUTOMATED COUNT: 12.4 % (ref 11.6–14.5)
GLUCOSE BLD STRIP.AUTO-MCNC: 101 MG/DL (ref 70–110)
GLUCOSE SERPL-MCNC: 100 MG/DL (ref 74–99)
HCT VFR BLD AUTO: 38.6 % (ref 35–45)
HGB BLD-MCNC: 13.3 G/DL (ref 12–16)
LYMPHOCYTES # BLD: 1.4 K/UL (ref 0.9–3.6)
LYMPHOCYTES NFR BLD: 20 % (ref 21–52)
MCH RBC QN AUTO: 28.9 PG (ref 24–34)
MCHC RBC AUTO-ENTMCNC: 34.5 G/DL (ref 31–37)
MCV RBC AUTO: 83.9 FL (ref 78–100)
MONOCYTES # BLD: 0.6 K/UL (ref 0.05–1.2)
MONOCYTES NFR BLD: 9 % (ref 3–10)
NEUTS SEG # BLD: 5.1 K/UL (ref 1.8–8)
NEUTS SEG NFR BLD: 70 % (ref 40–73)
PLATELET # BLD AUTO: 253 K/UL (ref 135–420)
PMV BLD AUTO: 10.9 FL (ref 9.2–11.8)
POTASSIUM SERPL-SCNC: 3.6 MMOL/L (ref 3.5–5.5)
RBC # BLD AUTO: 4.6 M/UL (ref 4.2–5.3)
SODIUM SERPL-SCNC: 142 MMOL/L (ref 136–145)
TROPONIN I SERPL-MCNC: <0.02 NG/ML (ref 0–0.04)
WBC # BLD AUTO: 7.3 K/UL (ref 4.6–13.2)

## 2021-09-21 PROCEDURE — 99285 EMERGENCY DEPT VISIT HI MDM: CPT

## 2021-09-21 PROCEDURE — 85025 COMPLETE CBC W/AUTO DIFF WBC: CPT

## 2021-09-21 PROCEDURE — 74011000636 HC RX REV CODE- 636: Performed by: EMERGENCY MEDICINE

## 2021-09-21 PROCEDURE — 99218 HC RM OBSERVATION: CPT

## 2021-09-21 PROCEDURE — 82962 GLUCOSE BLOOD TEST: CPT

## 2021-09-21 PROCEDURE — 70450 CT HEAD/BRAIN W/O DYE: CPT

## 2021-09-21 PROCEDURE — 80048 BASIC METABOLIC PNL TOTAL CA: CPT

## 2021-09-21 PROCEDURE — 71045 X-RAY EXAM CHEST 1 VIEW: CPT

## 2021-09-21 PROCEDURE — 93005 ELECTROCARDIOGRAM TRACING: CPT

## 2021-09-21 PROCEDURE — 74011250636 HC RX REV CODE- 250/636: Performed by: FAMILY MEDICINE

## 2021-09-21 PROCEDURE — 70498 CT ANGIOGRAPHY NECK: CPT

## 2021-09-21 PROCEDURE — 82553 CREATINE MB FRACTION: CPT

## 2021-09-21 RX ORDER — POLYETHYLENE GLYCOL 3350 17 G/17G
17 POWDER, FOR SOLUTION ORAL DAILY PRN
Status: DISCONTINUED | OUTPATIENT
Start: 2021-09-21 | End: 2021-09-22 | Stop reason: HOSPADM

## 2021-09-21 RX ORDER — PREDNISONE 5 MG/1
5 TABLET ORAL DAILY
COMMUNITY
End: 2021-09-22

## 2021-09-21 RX ORDER — LABETALOL HCL 20 MG/4 ML
5 SYRINGE (ML) INTRAVENOUS
Status: DISCONTINUED | OUTPATIENT
Start: 2021-09-21 | End: 2021-09-22 | Stop reason: HOSPADM

## 2021-09-21 RX ORDER — ATORVASTATIN CALCIUM 20 MG/1
80 TABLET, FILM COATED ORAL
Status: DISCONTINUED | OUTPATIENT
Start: 2021-09-21 | End: 2021-09-22 | Stop reason: HOSPADM

## 2021-09-21 RX ORDER — GUAIFENESIN 100 MG/5ML
81 LIQUID (ML) ORAL DAILY
Status: DISCONTINUED | OUTPATIENT
Start: 2021-09-22 | End: 2021-09-22 | Stop reason: HOSPADM

## 2021-09-21 RX ORDER — ACETAMINOPHEN 325 MG/1
650 TABLET ORAL
Status: DISCONTINUED | OUTPATIENT
Start: 2021-09-21 | End: 2021-09-22 | Stop reason: HOSPADM

## 2021-09-21 RX ORDER — ONDANSETRON 8 MG/1
8 TABLET, ORALLY DISINTEGRATING ORAL
COMMUNITY

## 2021-09-21 RX ORDER — ONDANSETRON 2 MG/ML
4 INJECTION INTRAMUSCULAR; INTRAVENOUS
Status: DISCONTINUED | OUTPATIENT
Start: 2021-09-21 | End: 2021-09-22 | Stop reason: HOSPADM

## 2021-09-21 RX ORDER — SODIUM CHLORIDE 9 MG/ML
100 INJECTION, SOLUTION INTRAVENOUS CONTINUOUS
Status: DISPENSED | OUTPATIENT
Start: 2021-09-21 | End: 2021-09-22

## 2021-09-21 RX ORDER — HYOSCYAMINE SULFATE 0.12 MG/1
0.12 TABLET SUBLINGUAL
COMMUNITY

## 2021-09-21 RX ORDER — ACETAMINOPHEN 650 MG/1
650 SUPPOSITORY RECTAL
Status: DISCONTINUED | OUTPATIENT
Start: 2021-09-21 | End: 2021-09-22 | Stop reason: HOSPADM

## 2021-09-21 RX ADMIN — SODIUM CHLORIDE 100 ML/HR: 900 INJECTION, SOLUTION INTRAVENOUS at 22:42

## 2021-09-21 RX ADMIN — IOPAMIDOL 100 ML: 755 INJECTION, SOLUTION INTRAVENOUS at 12:06

## 2021-09-21 NOTE — ED NOTES
Pt ambulated independently to restroom in room with supervision per her request. BSC and bedpan offered and pt declined. Prior to getting OOB pt denies any numbness, tingling, weakness, SOB, chest pain or neck pain. Pt was able to ambulate safely without difficulty.

## 2021-09-21 NOTE — ED NOTES
Pt denies any contrast allergy. States she is unsure why her chart says there is one. MD aware. Pt in CT at this time.

## 2021-09-21 NOTE — ED PROVIDER NOTES
EMERGENCY DEPARTMENT HISTORY AND PHYSICAL EXAM    Date: 9/21/2021  Patient Name: Angelika Anguiano    History of Presenting Illness     Chief Complaint   Patient presents with    Numbness    Fatigue         History Provided By: Patient      Angelika Anguiano is a 42-year-old female anxiety, chronic pain, depression, hypertension, however there is him presents for evaluation of numbness, tingling. Patient awoke this morning and noted left-sided facial numbness and difficulty with her speech. Her facial numbness lasted approximately 1 hour. Following this patient had bilateral upper extremity numbness. Patient denies having this previously. She denies any associated chest pain, shortness of breath, nausea, vomiting, abdominal pain, patient does have a history of chronic pain but feels that this is different from previous. PCP: Rolm Baumgarten, MD    Current Outpatient Medications   Medication Sig Dispense Refill    benzocaine-menthoL (Chloraseptic Max) 15-10 mg lozg lozenge Take 1 Lozenge by mouth every two (2) hours as needed for Sore throat. 20 Lozenge 0    amitriptyline (ELAVIL) 25 mg tablet TK 1 T PO BID  2    benztropine (COGENTIN) 0.5 mg tablet TK 1 T PO BID  2    OXcarbazepine (TRILEPTAL) 150 mg tablet TK 1 T PO BID  2    propranolol (INDERAL) 40 mg tablet TK 1 T PO BID  2    QUEtiapine (SEROQUEL) 25 mg tablet TK 1 T PO BID  2    naproxen sodium (ANAPROX) 550 mg tablet TK 1 T PO Q 12 H  5    tiZANidine (ZANAFLEX) 2 mg capsule TK 2 CS PO QID  6    gabapentin (NEURONTIN) 300 mg capsule TK 1 C PO 4 TO 5 TIMES A DAY  5    amitriptyline (ELAVIL) 75 mg tablet Take  by mouth nightly.  CHOLECALCIFEROL, VITAMIN D3, (VITAMIN D3 PO) Take  by mouth.  clonazePAM (KLONOPIN) 0.5 mg tablet Take 0.5 mg by mouth two (2) times a day.          Past History     Past Medical History:  Past Medical History:   Diagnosis Date    Anxiety     Arrhythmia     Chronic pain     Dysuria     Gastrointestinal disorder colitis    Gross hematuria     Heart murmur     Hematuria     HPV (human papilloma virus) infection     Hypertension     Hypothyroidism     Ischemic colitis ([facility] [address]     MS (multiple sclerosis) (HCC)     OCD (obsessive compulsive disorder)     Other ill-defined conditions(799.89)     prescription drug addiction    Pain management     Psychiatric disorder     depression, bipolar anxiety, ocd    Rapid heart rate     Seizures (HCC)     Tattoo     Tattoos    Vitamin D deficiency     Vulvar pain        Past Surgical History:  Past Surgical History:   Procedure Laterality Date    HX ORTHOPAEDIC      back surgery, ulnar nerve    HX ORTHOPAEDIC      back surgery x 2    HX OTHER SURGICAL      ulner nerve surgery    HX TUBAL LIGATION         Family History:  Family History   Problem Relation Age of Onset    Diabetes Mother     Cancer Maternal Grandmother     Breast Cancer Maternal Aunt     Thyroid Disease Maternal Aunt        Social History:  Social History     Tobacco Use    Smoking status: Former Smoker     Packs/day: 1.50     Types: Cigarettes     Quit date: 7/10/2017     Years since quittin.2    Smokeless tobacco: Never Used    Tobacco comment: pt stated she quit 2 weeks ago   Substance Use Topics    Alcohol use: No     Comment: occasionally    Drug use: No       Allergies: Allergies   Allergen Reactions    Ciprofloxacin Other (comments)     Can't tolerated, makes nauseous    Sulfatrim Ds Myalgia    Contrast Dye [Iodine] Palpitations    Sulfa (Sulfonamide Antibiotics) Other (comments)    Sulfa (Sulfonamide Antibiotics) Myalgia    Penicillins Rash         Review of Systems   Review of Systems   Constitutional: Negative for activity change and fever. HENT: Negative for congestion and sore throat. Eyes: Negative for discharge. Respiratory: Negative for apnea. Cardiovascular: Negative for chest pain. Gastrointestinal: Negative for abdominal distention.    Genitourinary: Negative for dysuria and flank pain. Musculoskeletal: Negative for arthralgias. Skin: Negative for rash. Neurological: Positive for speech difficulty and numbness. Negative for dizziness and weakness. Hematological: Negative for adenopathy. Psychiatric/Behavioral: Negative for agitation. All other systems reviewed and are negative.         Physical Exam     Vitals:    09/21/21 1315 09/21/21 1348 09/21/21 1400 09/21/21 1615   BP:  115/72 111/69 119/71   Pulse: 78 69 69 60   Resp: 20 15 21 10   Temp:       SpO2: 100% 100% 100%    Weight:       Height:         Physical Exam    Nursing notes and vital signs reviewed    Constitutional: Non toxic appearing, moderate distress  Head: Normocephalic, Atraumatic  Eyes: EOMI  Neck: Supple  Cardiovascular: Regular rate and rhythm, no murmurs, rubs, or gallops  Chest: Normal work of breathing and chest excursion bilaterally  Lungs: Clear to ausculation bilaterally  Abdomen: Soft, non tender, non distended, normoactive bowel sounds  Back: No evidence of trauma or deformity  Extremities: No evidence of trauma or deformity, no LE edema  Skin: Warm and dry, normal cap refill  Neuro: Alert and appropriate, CN intact, normal speech, subjective diminished sensation in bilateral upper extremities, no weakness appreciated, minimally delayed speech, upper extremities and lower extremities symmetric bilaterally, normal gait, normal coordination  Psychiatric: Normal mood and affect      Diagnostic Study Results     Labs -     Recent Results (from the past 12 hour(s))   EKG, 12 LEAD, INITIAL    Collection Time: 09/21/21 11:39 AM   Result Value Ref Range    Ventricular Rate 80 BPM    Atrial Rate 80 BPM    P-R Interval 164 ms    QRS Duration 76 ms    Q-T Interval 358 ms    QTC Calculation (Bezet) 412 ms    Calculated P Axis 75 degrees    Calculated R Axis 43 degrees    Calculated T Axis 51 degrees    Diagnosis       Normal sinus rhythm  Low voltage QRS  Borderline ECG  When compared with ECG of 01-FEB-2020 10:56,  No significant change was found     GLUCOSE, POC    Collection Time: 09/21/21 11:49 AM   Result Value Ref Range    Glucose (POC) 101 70 - 110 mg/dL   CBC WITH AUTOMATED DIFF    Collection Time: 09/21/21 12:07 PM   Result Value Ref Range    WBC 7.3 4.6 - 13.2 K/uL    RBC 4.60 4.20 - 5.30 M/uL    HGB 13.3 12.0 - 16.0 g/dL    HCT 38.6 35.0 - 45.0 %    MCV 83.9 78.0 - 100.0 FL    MCH 28.9 24.0 - 34.0 PG    MCHC 34.5 31.0 - 37.0 g/dL    RDW 12.4 11.6 - 14.5 %    PLATELET 848 026 - 547 K/uL    MPV 10.9 9.2 - 11.8 FL    NEUTROPHILS 70 40 - 73 %    LYMPHOCYTES 20 (L) 21 - 52 %    MONOCYTES 9 3 - 10 %    EOSINOPHILS 1 0 - 5 %    BASOPHILS 1 0 - 2 %    ABS. NEUTROPHILS 5.1 1.8 - 8.0 K/UL    ABS. LYMPHOCYTES 1.4 0.9 - 3.6 K/UL    ABS. MONOCYTES 0.6 0.05 - 1.2 K/UL    ABS. EOSINOPHILS 0.1 0.0 - 0.4 K/UL    ABS.  BASOPHILS 0.0 0.0 - 0.1 K/UL    DF AUTOMATED     METABOLIC PANEL, BASIC    Collection Time: 09/21/21 12:07 PM   Result Value Ref Range    Sodium 142 136 - 145 mmol/L    Potassium 3.6 3.5 - 5.5 mmol/L    Chloride 108 100 - 111 mmol/L    CO2 28 21 - 32 mmol/L    Anion gap 6 3.0 - 18 mmol/L    Glucose 100 (H) 74 - 99 mg/dL    BUN 8 7.0 - 18 MG/DL    Creatinine 0.74 0.6 - 1.3 MG/DL    BUN/Creatinine ratio 11 (L) 12 - 20      GFR est AA >60 >60 ml/min/1.73m2    GFR est non-AA >60 >60 ml/min/1.73m2    Calcium 8.7 8.5 - 10.1 MG/DL   CARDIAC PANEL,(CK, CKMB & TROPONIN)    Collection Time: 09/21/21 12:07 PM   Result Value Ref Range    CK - MB <1.0 <3.6 ng/ml    CK-MB Index  0.0 - 4.0 %     CALCULATION NOT PERFORMED WHEN RESULT IS BELOW LINEAR LIMIT    CK 67 26 - 192 U/L    Troponin-I, QT <0.02 0.0 - 0.045 NG/ML   EKG, 12 LEAD, INITIAL    Collection Time: 09/21/21 12:47 PM   Result Value Ref Range    Ventricular Rate 73 BPM    Atrial Rate 73 BPM    P-R Interval 178 ms    QRS Duration 78 ms    Q-T Interval 360 ms    QTC Calculation (Bezet) 396 ms    Calculated P Axis 66 degrees    Calculated R Axis 37 degrees    Calculated T Axis 62 degrees    Diagnosis       Normal sinus rhythm  Normal ECG  When compared with ECG of 01-FEB-2020 10:56,  No significant change was found         Radiologic Studies -   XR CHEST PORT   Final Result      Mildly underexpanded lungs without superimposed acute radiographic   cardiopulmonary abnormality. CTA HEAD NECK W CONT   Final Result   1. No hemodynamically significant cervical vascular stenosis. 2. Unremarkable brain CTA. I provided a preliminary report at the time of the exam acquisition. CT HEAD WO CONT   Final Result         1. No acute intracranial abnormality demonstrated. CRITICAL RESULT:  CODE S stroke results called to Dr. Violeta Temple in the emergency   room prior to dictation at 12:05 PM, 9/21/2021         CT Results  (Last 48 hours)               09/21/21 1216  CTA HEAD NECK W CONT Final result    Impression:  1. No hemodynamically significant cervical vascular stenosis. 2. Unremarkable brain CTA. I provided a preliminary report at the time of the exam acquisition. Narrative:  Brain CT angiogram and Neck CT angiogram:       Indication: Evaluate for stenosis. Possible infarction. Evaluate for source of   embolus. CODE S evaluation. Left facial numbness and weakness. Procedure:        Neck CT angiogram: Contrast was administered with a power injector. Axial thin   section volume acquisition  scans were obtained timed for peak arterial   enhancement. An automated triggering system was used. Axial images were   generated. Angiographic coronal and sagittal reformations were also generated. Extensive 3-D separate workstation processing was performed by [address] [place] [address]. Brain CT angiogram: Dedicated slab MIP overlapping angiographic reconstructions   in the sagittal, axial and coronal plane of the brain component of the axial   evaluation were also performed.   Extensive 3-D separate workstation processing   was performed by [employer]. One or more dose reduction techniques were used on this CT: automated exposure   control, adjustment of the mAs and/or kVp according to patient size, and   iterative reconstruction techniques. The specific techniques used on this CT   exam have been documented in the patient's electronic medical record. Digital   Imaging and Communications in Medicine (DICOM) format image data are available   to nonaffiliated external healthcare facilities or entities on a secure, media   free, reciprocally searchable basis with patient authorization for at least a   12-month period after this study. Comparison exam: None. Findings: Neck CTA:       Any internal carotid stenosis described below uses NASCET criteria, minimal   residual lumen diameter versus the non-tapering cervical internal carotid   artery. Aortic Arch: Common origin of the innominate and left common, minimal bovine   arch. No proximal great vessel stenosis. Left carotid: No stenosis or other vascular abnormality. Right carotid:  No stenosis or other vascular abnormality. The vertebral arteries are moderately left dominant. Right vertebral artery:  No stenosis or other vascular abnormality. Left vertebral artery:  No stenosis or other vascular abnormality. Lung apices:  No mass. Neck soft tissues: Heterogeneity of the thyroid gland. Small benign   hypodensities anterior inferior both lobes but no suspicious dominant mass. No   additional significant neck soft tissue abnormality is appreciated. Brain CTA:       Carotid siphon and supraclinoid internal carotid artery: No significant   stenosis. M1 segment and proximal M2 segment MCA:  No significant stenosis or aneurysm. A1 segment, anterior communicating artery and proximal A2 segments:  No   significant stenosis or aneurysm. Vertebrobasilar system:  Dominant left vertebral artery.  Otherwise unremarkable. No stenosis or aneurysm. Distal anterior cerebral artery:  No significant stenosis or aneurysm. Distal MCA M2/M3 segment:  No significant stenosis or aneurysm. No other significant vascular abnormality. Brain parenchyma on source data:  No significant parenchymal brain abnormality. 09/21/21 1201  CT HEAD WO CONT Final result    Impression:          1. No acute intracranial abnormality demonstrated. CRITICAL RESULT:  CODE S stroke results called to Dr. Miguel Cho in the emergency   room prior to dictation at 12:05 PM, 9/21/2021        Narrative:  EXAM: CT head       INDICATION: Left-sided facial and arm numbness, fatigue       COMPARISON: CT head 2/24/2020, MR brain May 6, 2020       TECHNIQUE: Axial CT imaging of the head was performed without intravenous   contrast. Standard multiplanar coronal and sagittal reformatted images were   obtained and are included in interpretation. One or more dose reduction techniques were used on this CT: automated exposure   control, adjustment of the mAs and/or kVp according to patient size, and   iterative reconstruction techniques. The specific techniques used on this CT   exam have been documented in the patient's electronic medical record. Digital   Imaging and Communications in Medicine (DICOM) format image data are available   to nonaffiliated external healthcare facilities or entities on a secure, media   free, reciprocally searchable basis with patient authorization for at least a   12-month period after this study. _______________       FINDINGS:       BRAIN AND POSTERIOR FOSSA: The sulci, folia, ventricles and basal cisterns are   within normal limits for the patient?s age. There is no intracranial hemorrhage,   mass effect, or midline shift. Gray-white matter differentiation appears within   normal limits. EXTRA-AXIAL SPACES AND MENINGES: There are no abnormal extra-axial fluid   collections. CALVARIUM: Intact. SINUSES: Imaged paranasal sinuses and mastoid air cells are clear. OTHER: None.       _______________               CXR Results  (Last 48 hours)               09/21/21 1404  XR CHEST PORT Final result    Impression:      Mildly underexpanded lungs without superimposed acute radiographic   cardiopulmonary abnormality. Narrative:  EXAM: XR CHEST PORT       CLINICAL INDICATION/HISTORY: Potential stroke   -Additional: Weakness       COMPARISON: 10/2/2019       TECHNIQUE: Frontal view of the chest       _______________       FINDINGS:       HEART AND MEDIASTINUM: Normal cardiac size and mediastinal contours. LUNGS AND PLEURAL SPACES: Lungs are mildly underexpanded but clear. There is no   focal pneumonic opacity. No evidence of pneumothorax or pleural effusion. BONY THORAX AND SOFT TISSUES: No acute osseous abnormality       _______________                 Medications given in the ED-  Medications   iopamidoL (ISOVUE-370) 76 % injection 100 mL (100 mL IntraVENous Given 9/21/21 1206)         Medical Decision Making   I am the first provider for this patient. I reviewed the vital signs, available nursing notes, past medical history, past surgical history, family history and social history. Vital Signs-Reviewed the patient's vital signs. Pulse Oximetry Analysis - 100%on room air, not hypoxic     Cardiac Monitor:  Rate: 60 bpm  Rhythm: Normal sinus rhythm    EKG interpretation: (Preliminary)  EKG read by Dr. Nora Hernandez at 1247  Normal sinus rhythm, rate 73  Normal intervals, , QRS 78, QTc 396  Normal EKG, no ST elevation or T wave inversion    Records Reviewed: Nursing Notes    Provider Notes (Medical Decision Making): Micaela Lawrence is a 55 y.o. female who presents for evaluation of numbness and tingling. On arrival patient is afebrile, nontoxic-appearing and hemodynamically stable.   Patient noted to have minimally delayed speech with no true expressive aphasia, reported bilateral diminished sensation to light touch, no weakness is appreciated. Given onset within the last 24 hours, patient activated as a code stroke. Given that patient woke up with her times and last known normal was last night, patient is not a TPA candidate. CT noncontrast found to be negative, CTA negative for evidence of large vessel occlusion. Teleneurology recommends admission for MRI, patient's examination appears to be somewhat consistent, he does not recommend aspirin initiation at this time. Chest x-ray, EKG, laboratory evaluation otherwise unremarkable. Discussed with Dr. Cy Whitten who will follow, Dr. Laila cast will admit under the hospitalist service for further TIA versus CVA evaluation. Procedures:  Procedures      Diagnosis and Disposition     CONSULT NOTE:   I spoke with Dr. Cy Whitten  Specialty: Neurology  Discussed pt's hx, disposition, and available diagnostic and imaging results. Reviewed care plans. Consulting physician agrees with plans as outlined. .   Written by Charisse Cassidy MD     ADMISSION NOTE:  Patient is being admitted to the hospital by Dr. Noel Castleman. The results of their tests and reasons for their admission have been discussed with them and/or available family. They convey agreement and understanding for the need to be admitted and for their admission diagnosis. Written by Charisse Cassidy MD     CONDITIONS ON ADMISSION:  Deep Vein Thrombosis is not present at the time of admission. Thrombosis is not present at the time of admission. Urinary Tract Infection is not present at the time of admission. Pneumonia is not present at the time of admission. MRSA is not present at the time of admission. Wound infection is not present at the time of admission. Pressure Ulcer is not present at the time of admission. CLINICAL IMPRESSION    1. Numbness and tingling          CLINICAL IMPRESSION:    1. Numbness and tingling        PLAN:  1. Admit for MRI and neurology     Current Discharge Medication List        Follow-up Information    None       _______________________________      Please note that this dictation was completed with [employer], the computer voice recognition software. Quite often unanticipated grammatical, syntax, homophones, and other interpretive errors are inadvertently transcribed by the computer software. Please disregard these errors. Please excuse any errors that have escaped final proofreading.

## 2021-09-21 NOTE — PROGRESS NOTES
Prelim Report H and N CTA    No LVO. ER notified. Adarsh Barbosa MD  Neuroradiology, [facility]  9/21/2021

## 2021-09-21 NOTE — ED NOTES
TRANSFER - OUT REPORT:    Verbal report given to Sonia Sexton RN(name) on Cesar Reeves  being transferred to Tele(unit) for routine progression of care       Report consisted of patients Situation, Background, Assessment and   Recommendations(SBAR). Information from the following report(s) SBAR, Kardex, ED Summary, MAR, Accordion, Recent Results and Cardiac Rhythm nsr was reviewed with the receiving nurse. Lines:   Peripheral IV 09/21/21 Right Antecubital (Active)   Site Assessment Clean, dry, & intact 09/21/21 1207   Phlebitis Assessment 0 09/21/21 1207   Dressing Status Clean, dry, & intact 09/21/21 1207   Dressing Type Tape;Transparent 09/21/21 1207   Hub Color/Line Status Pink;Patent; Flushed 09/21/21 1207        Opportunity for questions and clarification was provided.       Patient transported with:   Monitor  Tech

## 2021-09-21 NOTE — H&P
History & Physical    Patient: Luis Miguel Fontana MRN: [id number]  CSN: [id number]    YOB: 1975  Age: 55 y.o. Sex: female      DOA: 9/21/2021  Primary Care Provider:  Marisela Souza MD    Assessment/Plan   44-year-old female with a history of depression, generalized anxiety disorder for stroke evaluation after patient had left-sided facial numbness with delayed speech this morning. Speech abnormality, resolved  Bilateral lower extremity weakness  Mild gait abnormality, resolved    CTA head neck unremarkable  CT head unremarkable  MRI without contrast ordered  Appreciate neurology expertise, Dr. Kip Alexander    Daily aspirin, statin  Permissive hypertension, only treat if blood pressure goes higher than 200/110, prefer treatment with labetalol IV    Avoid heparin based anticoagulation, use SCDs instead for DVT prophylaxis,  Pepcid for GI prophylaxis    Home medications for depression anxiety    DVT/GI prophylaxis    Advanced care planning:  Patient present for this discussion. Patient has the capacity to make decisions on her own behalf  Patient does not have an advanced directive. The patient has appointment decision-maker if incapacitated: Mother  Patient has made a decision on CODE STATUS: FULL  I have discussed with the patient regarding CODE STATUS. I have described potential options in the event of cardiac or respiratory arrest.  I have explained what being \"full code\" means, including cardiopulmonary resuscitation attempts with chest compressions, potential cardioversion or \"shocks\" as well as intubation and mechanical life support. I have also explained DO NOT RESUSCITATE which would mean the allowance of a natural death without aggressive interventions.   Time spent on above discussion: 15 minutes    Patient Active Problem List   Diagnosis Code    Unspecified episodic mood disorder F39    Anxiety state, unspecified F41.1    Combinations of drug dependence excluding opioid type drug, unspecified F19.20    Colitis K52.9    Rectal bleeding K62.5    Abdominal pain, generalized R10.84    Cervical radiculopathy M54.12    Neck pain M54.2    Chronic sinusitis J32.9    Acute bacterial conjunctivitis of right eye H10.31    Numbness and tingling R20.0, R20.2     Estimated length of stay: 2-3 days    CC: left side of her body paralyzed for at least an hour       HPI:     Ray Mitchell is a 55 y.o. female with a history of depression, generalized anxiety, chronic pain, ongoing nonspecific neurological complaints presents to the emergency room complaining of numbness and tingling in her upper extremities. She states that she woke up this morning and noticed that the left side of her face was numb and her speech was delayed. By the time she arrived to the emergency room face felt back to best baseline but now complains of bilateral upper extremity numbness. She states that she did have rather complicated tooth extraction a week ago and wonders if some of the numbness in her face have to do with her dental surgery. She also reports having sharp spasms in her neck and having to lean her head forward but she says this is chronic for her. Patient also verbalized very concerned that she might have caught cauda equina syndrome because she states that she has numbness in her groin. Because the patient's initial complaints Code S was called in the ED, however teleneurology did not think that she was having stroke symptoms and obviously patient was not a TPA candidate. CT head was found to be unremarkable and CTA was unremarkable for large vessel occlusion. Teleneurology recommended admission for MRI.      Past Medical History:   Diagnosis Date    Anxiety     Arrhythmia     Chronic pain     Dysuria     Gastrointestinal disorder     colitis    Gross hematuria     Heart murmur     Hematuria     HPV (human papilloma virus) infection     Hypertension     Hypothyroidism     Ischemic colitis ([facility] [address]  MS (multiple sclerosis) (HCC)     OCD (obsessive compulsive disorder)     Other ill-defined conditions(799.89)     prescription drug addiction    Pain management     Psychiatric disorder     depression, bipolar anxiety, ocd    Rapid heart rate     Seizures (HCC)     Tattoo     Tattoos    Vitamin D deficiency     Vulvar pain        Past Surgical History:   Procedure Laterality Date    HX ORTHOPAEDIC      back surgery, ulnar nerve    HX ORTHOPAEDIC      back surgery x 2    HX OTHER SURGICAL      ulner nerve surgery    HX TUBAL LIGATION         Family History   Problem Relation Age of Onset    Diabetes Mother     Cancer Maternal Grandmother     Breast Cancer Maternal Aunt     Thyroid Disease Maternal Aunt        Social History     Socioeconomic History    Marital status: SINGLE     Spouse name: Not on file    Number of children: Not on file    Years of education: Not on file    Highest education level: Not on file   Tobacco Use    Smoking status: Former Smoker     Packs/day: 1.50     Types: Cigarettes     Quit date: 7/10/2017     Years since quittin.2    Smokeless tobacco: Never Used    Tobacco comment: pt stated she quit 2 weeks ago   Substance and Sexual Activity    Alcohol use: No     Comment: occasionally    Drug use: No    Sexual activity: Not Currently   Other Topics Concern   Social History Narrative    ** Merged History Encounter **          Social Determinants of Health     Financial Resource Strain:     Difficulty of Paying Living Expenses:    Food Insecurity:     Worried About Running Out of Food in the Last Year:     Ran Out of Food in the Last Year:    Transportation Needs:     Lack of Transportation (Medical):      Lack of Transportation (Non-Medical):    Physical Activity:     Days of Exercise per Week:     Minutes of Exercise per Session:    Stress:     Feeling of Stress :    Social Connections:     Frequency of Communication with Friends and Family:     Frequency of Social Gatherings with Friends and Family:     Attends Gnosticist Services:     Active Member of Clubs or Organizations:     Attends Club or Organization Meetings:     Marital Status:        Prior to Admission medications    Medication Sig Start Date End Date Taking? Authorizing Provider   benzocaine-menthoL (Chloraseptic Max) 15-10 mg lozg lozenge Take 1 Lozenge by mouth every two (2) hours as needed for Sore throat. 3/21/20   Alyse Morris MD   amitriptyline (ELAVIL) 25 mg tablet TK 1 T PO BID 7/13/17   Provider, Historical   benztropine (COGENTIN) 0.5 mg tablet TK 1 T PO BID 7/13/17   Provider, Historical   OXcarbazepine (TRILEPTAL) 150 mg tablet TK 1 T PO BID 7/13/17   Provider, Historical   propranolol (INDERAL) 40 mg tablet TK 1 T PO BID 7/13/17   Provider, Historical   QUEtiapine (SEROQUEL) 25 mg tablet TK 1 T PO BID 7/13/17   Provider, Historical   naproxen sodium (ANAPROX) 550 mg tablet TK 1 T PO Q 12 H 6/29/17   Provider, Historical   tiZANidine (ZANAFLEX) 2 mg capsule TK 2 CS PO QID 6/22/17   Provider, Historical   gabapentin (NEURONTIN) 300 mg capsule TK 1 C PO 4 TO 5 TIMES A DAY 6/21/17   Provider, Historical   amitriptyline (ELAVIL) 75 mg tablet Take  by mouth nightly. Other, MD Aissatou   CHOLECALCIFEROL, VITAMIN D3, (VITAMIN D3 PO) Take  by mouth. Other, MD Aissatou   clonazePAM (KLONOPIN) 0.5 mg tablet Take 0.5 mg by mouth two (2) times a day. Other, MD Aissatou       Allergies   Allergen Reactions    Ciprofloxacin Other (comments)     Can't tolerated, makes nauseous    Sulfatrim Ds Myalgia    Contrast Dye [Iodine] Palpitations    Sulfa (Sulfonamide Antibiotics) Other (comments)    Sulfa (Sulfonamide Antibiotics) Myalgia    Penicillins Rash       Review of Systems  Gen: No fever, chills, malaise, weight loss/gain. Heent: No headache, rhinorrhea, epistaxis, ear pain, hearing loss, sinus pain, neck pain/stiffness, sore throat.    Heart: No chest pain, palpitations, GALAN, pnd, or orthopnea. Resp: No cough, hemoptysis, wheezing and shortness of breath. GI: No nausea, vomiting, diarrhea, constipation, melena or hematochezia. : No urinary obstruction, dysuria or hematuria. Derm: No rash, new skin lesion or pruritis. Musc/skeletal: no bone or joint complains. Vasc: No edema, cyanosis or claudication. Endo: No heat/cold intolerance, no polyuria,polydipsia or polyphagia. Neuro: No unilateral weakness, numbness, tingling. No seizures. Heme: No easy bruising or bleeding. Physical Exam:     Physical Exam:  Visit Vitals  /68   Pulse 75   Temp 98.7 °F (37.1 °C)   Resp 11   Ht 5' 4\" (1.626 m)   Wt 63.5 kg (140 lb)   SpO2 100%   BMI 24.03 kg/m²      O2 Device: None (Room air)    Temp (24hrs), Av.7 °F (37.1 °C), Min:98.7 °F (37.1 °C), Max:98.7 °F (37.1 °C)    No intake/output data recorded. No intake/output data recorded. General:  Awake, cooperative, no distress. Head:  Normocephalic, without obvious abnormality, atraumatic. Eyes:  Conjunctivae/corneas clear, sclera anicteric, PERRL, EOMs intact. Nose: Nares normal. No drainage or sinus tenderness. Throat: Lips, mucosa, and tongue normal.    Neck: Supple, symmetrical, trachea midline, no adenopathy. Lungs:   Clear to auscultation bilaterally. Heart:  Regular rate and rhythm, S1, S2 normal, no murmur, click, rub or gallop. Abdomen: Soft, non-tender. Bowel sounds normal. No masses,  No organomegaly. Extremities: Extremities normal, atraumatic, no cyanosis or edema. Capillary refill normal.   Pulses: 2+ and symmetric all extremities. Skin: Skin color pink/pale/mottled/flushed, turgor normal. No rashes or lesions   Neurologic: CNII-XII intact. No focal motor or sensory deficit.        Labs Reviewed:    Recent Results (from the past 24 hour(s))   EKG, 12 LEAD, INITIAL    Collection Time: 21 11:39 AM   Result Value Ref Range    Ventricular Rate 80 BPM    Atrial Rate 80 BPM P-R Interval 164 ms    QRS Duration 76 ms    Q-T Interval 358 ms    QTC Calculation (Bezet) 412 ms    Calculated P Axis 75 degrees    Calculated R Axis 43 degrees    Calculated T Axis 51 degrees    Diagnosis       Normal sinus rhythm  Low voltage QRS  Borderline ECG  When compared with ECG of 01-FEB-2020 10:56,  No significant change was found     GLUCOSE, POC    Collection Time: 09/21/21 11:49 AM   Result Value Ref Range    Glucose (POC) 101 70 - 110 mg/dL   CBC WITH AUTOMATED DIFF    Collection Time: 09/21/21 12:07 PM   Result Value Ref Range    WBC 7.3 4.6 - 13.2 K/uL    RBC 4.60 4.20 - 5.30 M/uL    HGB 13.3 12.0 - 16.0 g/dL    HCT 38.6 35.0 - 45.0 %    MCV 83.9 78.0 - 100.0 FL    MCH 28.9 24.0 - 34.0 PG    MCHC 34.5 31.0 - 37.0 g/dL    RDW 12.4 11.6 - 14.5 %    PLATELET 886 466 - 712 K/uL    MPV 10.9 9.2 - 11.8 FL    NEUTROPHILS 70 40 - 73 %    LYMPHOCYTES 20 (L) 21 - 52 %    MONOCYTES 9 3 - 10 %    EOSINOPHILS 1 0 - 5 %    BASOPHILS 1 0 - 2 %    ABS. NEUTROPHILS 5.1 1.8 - 8.0 K/UL    ABS. LYMPHOCYTES 1.4 0.9 - 3.6 K/UL    ABS. MONOCYTES 0.6 0.05 - 1.2 K/UL    ABS. EOSINOPHILS 0.1 0.0 - 0.4 K/UL    ABS.  BASOPHILS 0.0 0.0 - 0.1 K/UL    DF AUTOMATED     METABOLIC PANEL, BASIC    Collection Time: 09/21/21 12:07 PM   Result Value Ref Range    Sodium 142 136 - 145 mmol/L    Potassium 3.6 3.5 - 5.5 mmol/L    Chloride 108 100 - 111 mmol/L    CO2 28 21 - 32 mmol/L    Anion gap 6 3.0 - 18 mmol/L    Glucose 100 (H) 74 - 99 mg/dL    BUN 8 7.0 - 18 MG/DL    Creatinine 0.74 0.6 - 1.3 MG/DL    BUN/Creatinine ratio 11 (L) 12 - 20      GFR est AA >60 >60 ml/min/1.73m2    GFR est non-AA >60 >60 ml/min/1.73m2    Calcium 8.7 8.5 - 10.1 MG/DL   CARDIAC PANEL,(CK, CKMB & TROPONIN)    Collection Time: 09/21/21 12:07 PM   Result Value Ref Range    CK - MB <1.0 <3.6 ng/ml    CK-MB Index  0.0 - 4.0 %     CALCULATION NOT PERFORMED WHEN RESULT IS BELOW LINEAR LIMIT    CK 67 26 - 192 U/L    Troponin-I, QT <0.02 0.0 - 0.045 NG/ML   EKG, 12 LEAD, INITIAL    Collection Time: 09/21/21 12:47 PM   Result Value Ref Range    Ventricular Rate 73 BPM    Atrial Rate 73 BPM    P-R Interval 178 ms    QRS Duration 78 ms    Q-T Interval 360 ms    QTC Calculation (Bezet) 396 ms    Calculated P Axis 66 degrees    Calculated R Axis 37 degrees    Calculated T Axis 62 degrees    Diagnosis       Normal sinus rhythm  Normal ECG  When compared with ECG of 01-FEB-2020 10:56,  No significant change was found         Procedures/imaging: see electronic medical records for all procedures/Xrays and details which were not copied into this note but were reviewed prior to creation of Plan      CC: Rolm Baumgarten, MD

## 2021-09-21 NOTE — ED TRIAGE NOTES
Patient reports that she woke up with the left side of her body paralyzed for at least an hour. She reports feeling sick and faint. Patient reports she is numb down both of her arms. She reports her neck spasming. Reports having teeth pulled last Wednesday.

## 2021-09-22 ENCOUNTER — APPOINTMENT (OUTPATIENT)
Dept: MRI IMAGING | Age: 46
End: 2021-09-22
Attending: FAMILY MEDICINE
Payer: MEDICAID

## 2021-09-22 ENCOUNTER — APPOINTMENT (OUTPATIENT)
Dept: MRI IMAGING | Age: 46
End: 2021-09-22
Attending: PSYCHIATRY & NEUROLOGY
Payer: MEDICAID

## 2021-09-22 VITALS
BODY MASS INDEX: 24.36 KG/M2 | WEIGHT: 142.7 LBS | TEMPERATURE: 97.9 F | HEIGHT: 64 IN | RESPIRATION RATE: 20 BRPM | DIASTOLIC BLOOD PRESSURE: 70 MMHG | OXYGEN SATURATION: 98 % | HEART RATE: 72 BPM | SYSTOLIC BLOOD PRESSURE: 104 MMHG

## 2021-09-22 PROBLEM — R20.0 LEFT FACIAL NUMBNESS: Status: ACTIVE | Noted: 2021-09-22

## 2021-09-22 PROBLEM — R47.9 SPEECH ABNORMALITY: Status: ACTIVE | Noted: 2021-09-22

## 2021-09-22 PROBLEM — R26.9 GAIT DISTURBANCE: Status: ACTIVE | Noted: 2021-09-22

## 2021-09-22 LAB
ATRIAL RATE: 73 BPM
ATRIAL RATE: 80 BPM
CALCULATED P AXIS, ECG09: 66 DEGREES
CALCULATED P AXIS, ECG09: 75 DEGREES
CALCULATED R AXIS, ECG10: 37 DEGREES
CALCULATED R AXIS, ECG10: 43 DEGREES
CALCULATED T AXIS, ECG11: 51 DEGREES
CALCULATED T AXIS, ECG11: 62 DEGREES
CHOLEST SERPL-MCNC: 136 MG/DL
DIAGNOSIS, 93000: NORMAL
DIAGNOSIS, 93000: NORMAL
ERYTHROCYTE [DISTWIDTH] IN BLOOD BY AUTOMATED COUNT: 12.4 % (ref 11.6–14.5)
EST. AVERAGE GLUCOSE BLD GHB EST-MCNC: 91 MG/DL
HBA1C MFR BLD: 4.8 % (ref 4.2–5.6)
HCT VFR BLD AUTO: 37.7 % (ref 35–45)
HDLC SERPL-MCNC: 44 MG/DL (ref 40–60)
HDLC SERPL: 3.1 {RATIO} (ref 0–5)
HGB BLD-MCNC: 12.8 G/DL (ref 12–16)
LDLC SERPL CALC-MCNC: 79.2 MG/DL (ref 0–100)
LIPID PROFILE,FLP: NORMAL
MCH RBC QN AUTO: 29.8 PG (ref 24–34)
MCHC RBC AUTO-ENTMCNC: 34 G/DL (ref 31–37)
MCV RBC AUTO: 87.9 FL (ref 78–100)
P-R INTERVAL, ECG05: 164 MS
P-R INTERVAL, ECG05: 178 MS
PLATELET # BLD AUTO: 222 K/UL (ref 135–420)
PMV BLD AUTO: 11.1 FL (ref 9.2–11.8)
Q-T INTERVAL, ECG07: 358 MS
Q-T INTERVAL, ECG07: 360 MS
QRS DURATION, ECG06: 76 MS
QRS DURATION, ECG06: 78 MS
QTC CALCULATION (BEZET), ECG08: 396 MS
QTC CALCULATION (BEZET), ECG08: 412 MS
RBC # BLD AUTO: 4.29 M/UL (ref 4.2–5.3)
TRIGL SERPL-MCNC: 64 MG/DL (ref ?–150)
VENTRICULAR RATE, ECG03: 73 BPM
VENTRICULAR RATE, ECG03: 80 BPM
VLDLC SERPL CALC-MCNC: 12.8 MG/DL
WBC # BLD AUTO: 7.4 K/UL (ref 4.6–13.2)

## 2021-09-22 PROCEDURE — 97535 SELF CARE MNGMENT TRAINING: CPT

## 2021-09-22 PROCEDURE — 80061 LIPID PANEL: CPT

## 2021-09-22 PROCEDURE — 99218 HC RM OBSERVATION: CPT

## 2021-09-22 PROCEDURE — 74011250637 HC RX REV CODE- 250/637: Performed by: FAMILY MEDICINE

## 2021-09-22 PROCEDURE — 97162 PT EVAL MOD COMPLEX 30 MIN: CPT

## 2021-09-22 PROCEDURE — 85027 COMPLETE CBC AUTOMATED: CPT

## 2021-09-22 PROCEDURE — 92610 EVALUATE SWALLOWING FUNCTION: CPT

## 2021-09-22 PROCEDURE — 83036 HEMOGLOBIN GLYCOSYLATED A1C: CPT

## 2021-09-22 PROCEDURE — 72141 MRI NECK SPINE W/O DYE: CPT

## 2021-09-22 PROCEDURE — 70551 MRI BRAIN STEM W/O DYE: CPT

## 2021-09-22 PROCEDURE — 36415 COLL VENOUS BLD VENIPUNCTURE: CPT

## 2021-09-22 PROCEDURE — 97165 OT EVAL LOW COMPLEX 30 MIN: CPT

## 2021-09-22 RX ORDER — CLONAZEPAM 0.5 MG/1
0.5 TABLET ORAL 3 TIMES DAILY
Status: DISCONTINUED | OUTPATIENT
Start: 2021-09-22 | End: 2021-09-22 | Stop reason: HOSPADM

## 2021-09-22 RX ORDER — CLONAZEPAM 0.5 MG/1
1 TABLET ORAL 3 TIMES DAILY
Status: DISCONTINUED | OUTPATIENT
Start: 2021-09-22 | End: 2021-09-22

## 2021-09-22 RX ORDER — GABAPENTIN 100 MG/1
100 CAPSULE ORAL 3 TIMES DAILY
Status: DISCONTINUED | OUTPATIENT
Start: 2021-09-22 | End: 2021-09-22 | Stop reason: HOSPADM

## 2021-09-22 RX ADMIN — CLONAZEPAM 0.5 MG: 0.5 TABLET ORAL at 08:53

## 2021-09-22 RX ADMIN — GABAPENTIN 100 MG: 100 CAPSULE ORAL at 13:00

## 2021-09-22 NOTE — CONSULTS
NEUROLOGY CONSULTATION NOTE    Patient: Génesis Silva MRN: [id number]  CSN: [id number]    YOB: 1975  Age: 55 y.o. Sex: female    DOA: 9/21/2021 LOS:  LOS: 0 days        Requesting Physician: Dr. Violeta Temple  Reason for Consultation: Paresthesia              HISTORY OF PRESENT ILLNESS:   Génesis Silva is a 55 y.o. female with past medical history of anxiety and depression presented to hospital yesterday for numbness and tingling in left cheek and arm. Patient related that she woke up in the morning and then noticed left cheek numbness,' eye paralyzed' and left arm numbness and weakness. This morning, her symptoms almost all resolved except to that she has muscle twitching involving eyelid and face. Patient complains of neck pain and cervical lordosis. Patient told me that she was evaluated by multiple orthopedic surgeons, rheumatologist, neurologist, ENT, neurosurgeon locally and also [facility] neurosurgeon. She was told that she is not a surgical candidate. According to records she had cervical, thoracic and lumbar spine and brain MRI in the past, which were unremarkable.     PAST MEDICAL HISTORY:  Past Medical History:   Diagnosis Date    Anxiety     Arrhythmia     Chronic pain     Dysuria     Gastrointestinal disorder     colitis    Gross hematuria     Heart murmur     Hematuria     HPV (human papilloma virus) infection     Hypertension     Hypothyroidism     Ischemic colitis ([facility] [address]     MS (multiple sclerosis) (HCC)     OCD (obsessive compulsive disorder)     Other ill-defined conditions(799.89)     prescription drug addiction    Pain management     Psychiatric disorder     depression, bipolar anxiety, ocd    Rapid heart rate     Seizures (HCC)     Tattoo     Tattoos    Vitamin D deficiency     Vulvar pain      PAST SURGICAL HISTORY:  Past Surgical History:   Procedure Laterality Date    HX ORTHOPAEDIC      back surgery, ulnar nerve    HX ORTHOPAEDIC      back surgery x 2    HX OTHER SURGICAL      ulner nerve surgery    HX TUBAL LIGATION       FAMILY HISTORY:  Family History   Problem Relation Age of Onset    Diabetes Mother     Cancer Maternal Grandmother     Breast Cancer Maternal Aunt     Thyroid Disease Maternal Aunt      SOCIAL HISTORY:  Social History     Socioeconomic History    Marital status: SINGLE     Spouse name: Not on file    Number of children: Not on file    Years of education: Not on file    Highest education level: Not on file   Tobacco Use    Smoking status: Former Smoker     Packs/day: 1.50     Types: Cigarettes     Quit date: 7/10/2017     Years since quittin.2    Smokeless tobacco: Never Used    Tobacco comment: pt stated she quit 2 weeks ago   Substance and Sexual Activity    Alcohol use: No     Comment: occasionally    Drug use: No    Sexual activity: Not Currently   Other Topics Concern   Social History Narrative    ** Merged History Encounter **          Social Determinants of Health     Financial Resource Strain:     Difficulty of Paying Living Expenses:    Food Insecurity:     Worried About Running Out of Food in the Last Year:     Ran Out of Food in the Last Year:    Transportation Needs:     Lack of Transportation (Medical):      Lack of Transportation (Non-Medical):    Physical Activity:     Days of Exercise per Week:     Minutes of Exercise per Session:    Stress:     Feeling of Stress :    Social Connections:     Frequency of Communication with Friends and Family:     Frequency of Social Gatherings with Friends and Family:     Attends [employer] Services:     Active Member of Clubs or Organizations:     Attends Club or Organization Meetings:     Marital Status:      MEDICATIONS:  Current Facility-Administered Medications   Medication Dose Route Frequency    gabapentin (NEURONTIN) capsule 100 mg  100 mg Oral TID    clonazePAM (KlonoPIN) tablet 0.5 mg  0.5 mg Oral TID    0.9% sodium chloride infusion  100 mL/hr IntraVENous CONTINUOUS    ondansetron (ZOFRAN) injection 4 mg  4 mg IntraVENous Q6H PRN    aspirin chewable tablet 81 mg  81 mg Oral DAILY    atorvastatin (LIPITOR) tablet 80 mg  80 mg Oral QHS    acetaminophen (TYLENOL) tablet 650 mg  650 mg Oral Q4H PRN    acetaminophen (TYLENOL) suppository 650 mg  650 mg Rectal Q4H PRN    labetaloL (NORMODYNE;TRANDATE) 20 mg/4 mL (5 mg/mL) injection 5 mg  5 mg IntraVENous Q10MIN PRN    polyethylene glycol (MIRALAX) packet 17 g  17 g Oral DAILY PRN     Prior to Admission medications    Medication Sig Start Date End Date Taking? Authorizing Provider   ondansetron (Zofran ODT) 8 mg disintegrating tablet Take 8 mg by mouth every eight (8) hours as needed for Nausea or Vomiting. Yes Provider, Historical   hyoscyamine SL (LEVSIN/SL) 0.125 mg SL tablet 0.125 mg by SubLINGual route every six (6) hours as needed. Yes Provider, Historical   predniSONE (DELTASONE) 5 mg tablet Take 5 mg by mouth daily. Yes Provider, Historical   amitriptyline (ELAVIL) 25 mg tablet TK 1 T PO BID 7/13/17  Yes Provider, Historical   propranolol (INDERAL) 40 mg tablet Take 20 mg by mouth two (2) times a day. 7/13/17  Yes Provider, Historical   QUEtiapine (SEROQUEL) 25 mg tablet TK 1 T PO BID 7/13/17  Yes Provider, Historical   tiZANidine (ZANAFLEX) 2 mg capsule Take 4 mg by mouth every six (6) hours. 6/22/17  Yes Provider, Historical   gabapentin (NEURONTIN) 300 mg capsule Take 100 mg by mouth three (3) times daily. 6/21/17  Yes Provider, Historical   clonazePAM (KLONOPIN) 0.5 mg tablet Take 1 mg by mouth three (3) times daily. Yes Other, MD Aissatou   benzocaine-menthoL (Chloraseptic Max) 15-10 mg lozg lozenge Take 1 Lozenge by mouth every two (2) hours as needed for Sore throat.   Patient not taking: Reported on 9/21/2021 3/21/20   Arely Prescott MD   benztropine (COGENTIN) 0.5 mg tablet TK 1 T PO BID  Patient not taking: Reported on 9/21/2021 7/13/17   Provider, Historical   OXcarbazepine (TRILEPTAL) 150 mg tablet TK 1 T PO BID  Patient not taking: Reported on 9/21/2021 7/13/17   Provider, Historical   naproxen sodium (ANAPROX) 550 mg tablet TK 1 T PO Q 12 H  Patient not taking: Reported on 9/21/2021 6/29/17   Provider, Historical   amitriptyline (ELAVIL) 75 mg tablet Take  by mouth nightly. Patient not taking: Reported on 9/21/2021    Other, MD Aissatou   CHOLECALCIFEROL, VITAMIN D3, (VITAMIN D3 PO) Take  by mouth. Patient not taking: Reported on 9/21/2021    Other, MD Aissatou       ALLERGIES:  Allergies   Allergen Reactions    Ciprofloxacin Other (comments)     Can't tolerated, makes nauseous    Sulfatrim Ds Myalgia    Contrast Dye [Iodine] Palpitations    Sulfa (Sulfonamide Antibiotics) Other (comments)    Sulfa (Sulfonamide Antibiotics) Myalgia    Penicillins Rash       Review of Systems  GENERAL: No fevers or chills. HEENT: No change in vision, earache, tinnitus, sore throat or sinus congestion. NECK: No pain or stiffness. CARDIOVASCULAR: No chest pain or pressure. No palpitations. PULMONARY: No shortness of breath, cough or wheeze. GASTROINTESTINAL: No abdominal pain, nausea, vomiting or diarrhea. GENITOURINARY: No urinary frequency, urgency, hesitancy or dysuria. MUSCULOSKELETAL: No joint or muscle pain, no back pain, no recent trauma. DERMATOLOGIC: No rash, no itching, no lesions. ENDOCRINE: No polyuria, polydipsia, no heat or cold intolerance. No recent change in weight. HEMATOLOGICAL: No anemia or easy bruising or bleeding. NEUROLOGIC: No headache, seizures, numbness, tingling or weakness. PHYSICAL EXAMINATION:     Visit Vitals  /82   Pulse 62   Temp 97.9 °F (36.6 °C)   Resp 18   Ht 5' 4\" (1.626 m)   Wt 64.7 kg (142 lb 11.2 oz)   SpO2 97%   Breastfeeding No   BMI 24.49 kg/m²      O2 Device: None (Room air)  GENERAL: Pleasant, in no apparent distress. HEENT: Moist mucous membranes, sclerae anicteric, scalp is atraumatic.    CVS: Regular rate and rhythm, no murmurs or gallops. No carotid bruits. PULMONARY: Clear to auscultation bilaterally. No rales or rhonchi. No wheezing. EXTREMITIES: Normal range of motion at all sites. No deformities. ABDOMEN: Soft, nontender. SKIN: No rashes or ecchymoses. Warm and dry. NEUROLOGIC: Alert and oriented x3. Speech is fluent without any aphasia or dysarthria. Cranial nerves: Face is symmetric with symmetric smile. Facial sensation is intact. Extraocular movements are intact with no nystagmus. Visual fields are full to confrontation. PERRL. Tongue is midline. Palate elevates symmetrically. Hearing intact to speech. Sternocleidomastoid and trapezius strengths are full bilaterally. Motor: Normal tone and normal bulk on all four extremities. Strength is full on all four segmentally. There is no pronator drift or orbiting. Sensory: Intact to pinprick and touch on all four. Normal vibratory sensation on toes bilaterally. Coordination: Intact coordination with finger-nose-finger bilaterally. Normal fine movements. No bradykinesia detected. Deep tendon reflexes: 2+ at biceps, brachioradialis, patella and ankles bilaterally. Toes are down-going bilaterally. Gait assessment: Able to stand and walk with no difficulty. Able to perform tandem gait with no difficulty. Labs: Results:       Chemistry Recent Labs     09/21/21  1207   *      K 3.6      CO2 28   BUN 8   CREA 0.74   CA 8.7   AGAP 6   BUCR 11*      CBC w/Diff Recent Labs     09/22/21  0558 09/21/21  1207   WBC 7.4 7.3   RBC 4.29 4.60   HGB 12.8 13.3   HCT 37.7 38.6    253   GRANS  --  70   LYMPH  --  20*   EOS  --  1      Cardiac Enzymes Recent Labs     09/21/21  1207   CPK 67   CKND1 CALCULATION NOT PERFORMED WHEN RESULT IS BELOW LINEAR LIMIT      Coagulation No results for input(s): PTP, INR, APTT, INREXT in the last 72 hours.     Lipid Panel No results found for: CHOL, CHOLPOCT, CHOLX, CHLST, CHOLV, [id number], HDL, HDLP, LDL, LDLC, DLDLP, 901439, VLDLC, VLDL, TGLX, TRIGL, TRIGP, TGLPOCT, CHHD, CHHDX   BNP No results for input(s): BNPP in the last 72 hours. Liver Enzymes No results for input(s): TP, ALB, TBIL, AP in the last 72 hours. No lab exists for component: SGOT, GPT, DBIL   Thyroid Studies Lab Results   Component Value Date/Time    TSH 10.69 (H) 05/13/2013 04:48 PM          Radiology:  CT HEAD WO CONT    Result Date: 9/21/2021  EXAM: CT head INDICATION: Left-sided facial and arm numbness, fatigue COMPARISON: CT head 2/24/2020, MR brain May 6, 2020 TECHNIQUE: Axial CT imaging of the head was performed without intravenous contrast. Standard multiplanar coronal and sagittal reformatted images were obtained and are included in interpretation. One or more dose reduction techniques were used on this CT: automated exposure control, adjustment of the mAs and/or kVp according to patient size, and iterative reconstruction techniques. The specific techniques used on this CT exam have been documented in the patient's electronic medical record. Digital Imaging and Communications in Medicine (DICOM) format image data are available to nonaffiliated external healthcare facilities or entities on a secure, media free, reciprocally searchable basis with patient authorization for at least a 12-month period after this study. _______________ FINDINGS: BRAIN AND POSTERIOR FOSSA: The sulci, folia, ventricles and basal cisterns are within normal limits for the patient?s age. There is no intracranial hemorrhage, mass effect, or midline shift. Gray-white matter differentiation appears within normal limits. EXTRA-AXIAL SPACES AND MENINGES: There are no abnormal extra-axial fluid collections. CALVARIUM: Intact. SINUSES: Imaged paranasal sinuses and mastoid air cells are clear. OTHER: None. _______________     1. No acute intracranial abnormality demonstrated.  CRITICAL RESULT:  CODE S stroke results called to Dr. Jodie Rockwell in the emergency room prior to dictation at 12:05 PM, 9/21/2021     XR CHEST PORT    Result Date: 9/21/2021  EXAM: XR CHEST PORT CLINICAL INDICATION/HISTORY: Potential stroke -Additional: Weakness COMPARISON: 10/2/2019 TECHNIQUE: Frontal view of the chest _______________ FINDINGS: HEART AND MEDIASTINUM: Normal cardiac size and mediastinal contours. LUNGS AND PLEURAL SPACES: Lungs are mildly underexpanded but clear. There is no focal pneumonic opacity. No evidence of pneumothorax or pleural effusion. BONY THORAX AND SOFT TISSUES: No acute osseous abnormality _______________     Mildly underexpanded lungs without superimposed acute radiographic cardiopulmonary abnormality. CTA HEAD NECK W CONT    Result Date: 9/21/2021  Brain CT angiogram and Neck CT angiogram: Indication: Evaluate for stenosis. Possible infarction. Evaluate for source of embolus. CODE S evaluation. Left facial numbness and weakness. Procedure: Neck CT angiogram: Contrast was administered with a power injector. Axial thin section volume acquisition  scans were obtained timed for peak arterial enhancement. An automated triggering system was used. Axial images were generated. Angiographic coronal and sagittal reformations were also generated. Extensive 3-D separate workstation processing was performed by [employer]. Brain CT angiogram: Dedicated slab MIP overlapping angiographic reconstructions in the sagittal, axial and coronal plane of the brain component of the axial evaluation were also performed. Extensive 3-D separate workstation processing was performed by [employer]. One or more dose reduction techniques were used on this CT: automated exposure control, adjustment of the mAs and/or kVp according to patient size, and iterative reconstruction techniques. The specific techniques used on this CT exam have been documented in the patient's electronic medical record.  Digital Imaging and Communications in Medicine (DICOM) format image data are available to nonaffiliated external healthcare facilities or entities on a secure, media free, reciprocally searchable basis with patient authorization for at least a 12-month period after this study. Comparison exam: None. Findings: Neck CTA: Any internal carotid stenosis described below uses NASCET criteria, minimal residual lumen diameter versus the non-tapering cervical internal carotid artery. Aortic Arch: Common origin of the innominate and left common, minimal bovine arch. No proximal great vessel stenosis. Left carotid: No stenosis or other vascular abnormality. Right carotid:  No stenosis or other vascular abnormality. The vertebral arteries are moderately left dominant. Right vertebral artery:  No stenosis or other vascular abnormality. Left vertebral artery:  No stenosis or other vascular abnormality. Lung apices:  No mass. Neck soft tissues: Heterogeneity of the thyroid gland. Small benign hypodensities anterior inferior both lobes but no suspicious dominant mass. No additional significant neck soft tissue abnormality is appreciated. Brain CTA: Carotid siphon and supraclinoid internal carotid artery: No significant stenosis. M1 segment and proximal M2 segment MCA:  No significant stenosis or aneurysm. A1 segment, anterior communicating artery and proximal A2 segments:  No significant stenosis or aneurysm. Vertebrobasilar system:  Dominant left vertebral artery. Otherwise unremarkable. No stenosis or aneurysm. Distal anterior cerebral artery:  No significant stenosis or aneurysm. Distal MCA M2/M3 segment:  No significant stenosis or aneurysm. No other significant vascular abnormality. Brain parenchyma on source data:  No significant parenchymal brain abnormality. 1. No hemodynamically significant cervical vascular stenosis. 2. Unremarkable brain CTA. I provided a preliminary report at the time of the exam acquisition. ASSESSMENT/IMPRESSION:  78-year-old female presents with left cheek and arm numbness.   Symptoms have almost resolved this morning. 1. Paresthesia: Likely secondary to history of cervical spine stenosis and cervical radiculopathy. It is reasonable to rule out acute stroke. 2. Cervical spine stenosis and radiculopathy: According to MRI in 2019, patient has mild to moderate canal stenosis and cord flattening at C6/7. Stable moderate to severe bilateral foraminal stenosis. RECOMMENDATIONS:  1. Pending brain MRI and C-spine MRI. 2. Patient requests to see orthopedic surgeon in hospital.  Pending C-spine MRI results.       ------------------------------------  Zaid Gamble MD  9/22/2021  8:59 AM

## 2021-09-22 NOTE — PROGRESS NOTES
Problem: Dysphagia (Adult)  Goal: *Acute Goals and Plan of Care (Insert Text)  Description: Patient will:  1. Tolerate regular diet with thin liquids without overt s/sx of aspiration under SLP supervision. - MET 9/22/2021  2. Utilize compensatory swallow strategies of small bite/sip, alternate liquid/solid with min cues in 4/5 trials. - MET 9/22/2021      Rec:   regular diet, thin liquids, extra sauces/gravies  HOB >45 during po intake, remain >30 for 30-45 minutes after po   Small bites/sips; alternate liquid/solid, slow feeding rate   Oral care TID  Meds per pt preference    Outcome: Resolved/Met     [facility] [place]    Patient: Valerie Hidalgo (55 y.o. female)  Date: 9/22/2021  Primary Diagnosis: Numbness and tingling [R20.0, R20.2]        Precautions:      PLOF: per H&P    ASSESSMENT :  Pt seen for swallow eval. Pt AOx4, accepting of eval. Pt indicated \"her swallow feels slow\", reporting regular diet prior to current admission. Oral motor structures, strength, and ROM WFL; grossly intact for mastication and deglutition. Functional communication. Intelligibility >90%. Cognitive-linguistic function appears intact. Pt self-feeding PO trials of thin liquid via cup/straw and tandem drinking, mixed, and regular textures. No s/sx of aspiration appreciated across consistencies. Oropharyngeal swallow appears WFL. Swallow appears timely, adequate laryngeal elevation noted via palpation, no change in vocal/resp quality appreciated. Recommend regular texture diet with thin liquids extra sauce/gravy with all meals, meds per pt preference. Pt educated on and verbalized understanding of findings, recs, and POC. 0 formal ST needs for dysphagia indicated at this time. SLP will sign off accordingly. Please re-consult should further concerns arise. PLAN :  Recommendations and Planned Interventions:  No formal ST needs ID'd for dysphagia. Eval only.    Discharge Recommendations: None     SUBJECTIVE:   Patient stated it feels slow. OBJECTIVE:     Past Medical History:   Diagnosis Date    Anxiety     Arrhythmia     Chronic pain     Dysuria     Gastrointestinal disorder     colitis    Gross hematuria     Heart murmur     Hematuria     HPV (human papilloma virus) infection     Hypertension     Hypothyroidism     Ischemic colitis ([facility] [address]     MS (multiple sclerosis) (HCC)     OCD (obsessive compulsive disorder)     Other ill-defined conditions(799.89)     prescription drug addiction    Pain management     Psychiatric disorder     depression, bipolar anxiety, ocd    Rapid heart rate     Seizures (HCC)     Tattoo     Tattoos    Vitamin D deficiency     Vulvar pain      Past Surgical History:   Procedure Laterality Date    HX ORTHOPAEDIC      back surgery, ulnar nerve    HX ORTHOPAEDIC      back surgery x 2    HX OTHER SURGICAL      ulner nerve surgery    HX TUBAL LIGATION       Home Situation:   Home Situation  Home Environment: Apartment  # Steps to Enter: 15  One/Two Story Residence: Two story  Living Alone: No  Support Systems: Child(amanda)  Patient Expects to be Discharged to[de-identified] Apartment  Current DME Used/Available at Home: Sydna , quad    Diet prior to admission: regular  Current Diet:  regular/thin     Cognitive and Communication Status:  Neurologic State: Alert  Orientation Level: Oriented X4  Cognition: Follows commands  Perception: Appears intact  Perseveration: No perseveration noted  Safety/Judgement: Fall prevention  Oral Assessment:  Oral Assessment  Labial: No impairment  Dentition: Natural  Oral Hygiene: WFL  Lingual: No impairment  Velum: No impairment  Mandible: No impairment  P.O. Trials:  Patient Position: HOB 50*  Vocal quality prior to P.O.: No impairment  Consistency Presented: Thin liquid; Solid;Mixed consistency  How Presented: Self-fed/presented;Straw;Successive swallows;Spoon     Bolus Acceptance: No impairment  Bolus Formation/Control: No impairment     Propulsion: No impairment  Oral Residue: None  Initiation of Swallow: No impairment  Laryngeal Elevation: Functional  Aspiration Signs/Symptoms: None  Pharyngeal Phase Characteristics: No impairment, issues, or problems ; Other (comment) (pt c/o \"goes slow\")  Effective Modifications: Alternate liquids/solids;Small sips and bites  Cues for Modifications: Minimal       Oral Phase Severity: No impairment  Pharyngeal Phase Severity : No impairment    PAIN:  Pain level pre-treatment: 0/10   Pain level post-treatment: 0/10     After evaluation:   []            Patient left in no apparent distress sitting up in chair  [x]            Patient left in no apparent distress in bed  [x]            Call bell left within reach  [x]            Nursing notified  []            Family present  []            Caregiver present  []            Bed alarm activated      COMMUNICATION/EDUCATION:   []            Aspiration precautions; swallow safety; compensatory techniques. [x]            Patient/family have participated as able in goal setting and plan of care. [x]            Patient/family agree to work toward stated goals and plan of care. [x]            Patient understands intent and goals of therapy; neutral about participation. []            Patient unable to participate in goal setting/plan of care; educ ongoing with interdisciplinary staff  []         Posted safety precautions in patient's room.     Thank you for this referral.    Angel Mcgill M.S., CCC-SLP  Speech-Language Pathologist    Time Calculation: 9 mins

## 2021-09-22 NOTE — PROGRESS NOTES
0730: report given to this nurse from Jessica Hurtado RN    0800: MD Connolly Blend visit with pt noted, NIHSS complete, continue to monitor pt for any change, pt scores ) on NIHSS   Chichi Burnham RN    0830: MD Whelan at the bedside to assess and discuss tx plan with pt, pt acknowledges and agrees to tx plan, MRI pending  Chichi Burnham RN    1321: MRI checklist complete  Chichi Burnham RN    1240: transferred to MRI  LISBETH Caicedo RN    1310: pt returns from MRI  LISBETH Caicedo RN    [phone]: pt makes this nurse aware pain noted in neck and Rt jaw, pt made aware tylenol available, pt communicates tylenol not effective for pain and request clindamycin d/t taking after visit to the dentist office and dentist MD Britt Stapleton orders the antbx d/t pt needs to take if she thinks she has an infection, MD Tahira Alfaro made aware of pt concern and complaint of pain, pt request MRI results, MD Tahira Alfaro made aware   Chichi Burnham RN    [phone]: MD Tahira Alfaro makes this nurse aware communication pending with pt, reading MRI at this time  Chichi Burnham RN    1800: MD Tahira Alfaro makes this nurse aware pt to d/c home with referral to neurologist  Chichi Burnham RN    [phone]: d/c order noted for pt  Chichi Burnham RN    1842: d/c instructions reviewed with pt, all questions answered, pt waiting on   LISBETH Caicedo RN  1843: d/c instructions printed for review  Chichi Burnham RN

## 2021-09-22 NOTE — PROGRESS NOTES
Problem: Falls - Risk of  Goal: *Absence of Falls  Description: Document Teri Spare Fall Risk and appropriate interventions in the flowsheet.   Outcome: Progressing Towards Goal  Note: Fall Risk Interventions:            Medication Interventions: Bed/chair exit alarm, Evaluate medications/consider consulting pharmacy, Patient to call before getting OOB, Teach patient to arise slowly         History of Falls Interventions: Bed/chair exit alarm, Consult care management for discharge planning, Door open when patient unattended, Evaluate medications/consider consulting pharmacy, Assess for delayed presentation/identification of injury for 48 hrs (comment for end date), Vital signs minimum Q4HRs X 24 hrs (comment for end date) (refyse gait belt )         Problem: Patient Education: Go to Patient Education Activity  Goal: Patient/Family Education  Outcome: Progressing Towards Goal

## 2021-09-22 NOTE — PROGRESS NOTES
Hospitalist Progress Note    Patient: Jensen Streeter MRN: [id number]  CSN: [id number]    YOB: 1975  Age: 55 y.o. Sex: female    DOA: 9/21/2021 LOS:  LOS: 0 days          Chief Complaint:    Neck pain and numbness  Assessment/Plan     59-year-old female with a history of depression, generalized anxiety disorder for stroke evaluation after patient had left-sided facial numbness with delayed speech this morning.     Speech abnormality, resolved  Bilateral lower extremity weakness  Mild gait abnormality, resolved     CTA head neck unremarkable  CT head unremarkable  MRI without contrast ordered  Appreciate neurology expertise, Dr. Chaitanya Alvarado    MRI Brain WO contrast: No significant intracranial abnormality is demonstrated. No acute hemorrhage or  acute infarction. MRI CERV SPINE WO contrast:   1. The spinal canal is small on a developmental basis and further compromised by  degenerative changes. Central stenosis with very mild cord distortion that is  maximal at C6-C7. No cord signal change and this would not with confidence  correlate with a cervical spondylitic myelopathy.   2. Multilevel bilateral neural foramen stenosis as described above, also most  severe bilaterally at C6-C7.     Daily aspirin, statin  Permissive hypertension, only treat if blood pressure goes higher than 200/110, prefer treatment with labetalol IV     Avoid heparin based anticoagulation, use SCDs instead for DVT prophylaxis,  Pepcid for GI prophylaxis     Home medications for depression anxiety    Advised patient to call for OP appointment:  [address] [facility] in [place] [address], Phone: [phone] for further management of cervical central stenosis    DVT/GI Propphylaxis    Disposition : DC home   Patient Active Problem List   Diagnosis Code    Unspecified episodic mood disorder F39    Anxiety state, unspecified F41.1    Combinations of drug dependence excluding opioid type drug, unspecified F19.20  Colitis K52.9    Rectal bleeding K62.5    Abdominal pain, generalized R10.84    Cervical radiculopathy M54.12    Neck pain M54.2    Chronic sinusitis J32.9    Acute bacterial conjunctivitis of right eye H10.31    Numbness and tingling R20.0, R20.2    Gait disturbance R26.9    Left facial numbness R20.0    Speech abnormality R47.9       Subjective:    Pt c/o of continued neck pain. Reviewed MRI results with her. Review of systems:    Constitutional: denies fevers, chills, myalgias  Respiratory: denies SOB, cough  Cardiovascular: denies chest pain, palpitations  Gastrointestinal: denies nausea, vomiting, diarrhea      Vital signs/Intake and Output:  Visit Vitals  /70   Pulse 60   Temp 98 °F (36.7 °C)   Resp 16   Ht 5' 4\" (1.626 m)   Wt 64.7 kg (142 lb 11.2 oz)   SpO2 100%   Breastfeeding No   BMI 24.49 kg/m²     Current Shift:  No intake/output data recorded. Last three shifts:  No intake/output data recorded.     Exam:    General: Well developed, alert, NAD, OX3  Head/Neck: NCAT, supple, No masses, No lymphadenopathy  CVS:Regular rate and rhythm, no M/R/G, S1/S2 heard, no thrill  Lungs:Clear to auscultation bilaterally, no wheezes, rhonchi, or rales  Abdomen: Soft, Nontender, No distention, Normal Bowel sounds, No hepatomegaly  Extremities: No C/C/E, pulses palpable 2+  Skin:normal texture and turgor, no rashes, no lesions  Neuro:grossly normal , follows commands  Psych:appropriate                Labs: Results:       Chemistry Recent Labs     09/21/21  1207   *      K 3.6      CO2 28   BUN 8   CREA 0.74   CA 8.7   AGAP 6   BUCR 11*      CBC w/Diff Recent Labs     09/22/21  0558 09/21/21  1207   WBC 7.4 7.3   RBC 4.29 4.60   HGB 12.8 13.3   HCT 37.7 38.6    253   GRANS  --  70   LYMPH  --  20*   EOS  --  1      Cardiac Enzymes Recent Labs     09/21/21  1207   CPK 67   CKND1 CALCULATION NOT PERFORMED WHEN RESULT IS BELOW LINEAR LIMIT      Coagulation No results for input(s): PTP, INR, APTT, INREXT in the last 72 hours. Lipid Panel No results found for: CHOL, CHOLPOCT, CHOLX, CHLST, CHOLV, 142041, HDL, HDLP, LDL, LDLC, DLDLP, 489795, VLDLC, VLDL, TGLX, TRIGL, TRIGP, TGLPOCT, CHHD, CHHDX   BNP No results for input(s): BNPP in the last 72 hours. Liver Enzymes No results for input(s): TP, ALB, TBIL, AP in the last 72 hours.     No lab exists for component: SGOT, GPT, DBIL   Thyroid Studies Lab Results   Component Value Date/Time    TSH 10.69 (H) 05/13/2013 04:48 PM        Procedures/imaging: see electronic medical records for all procedures/Xrays and details which were not copied into this note but were reviewed prior to creation of Sabine Good MD

## 2021-09-22 NOTE — PROGRESS NOTES
Problem: Mobility Impaired (Adult and Pediatric)  Goal: *Acute Goals and Plan of Care (Insert Text)  Description: Physical Therapy Goals   Initiated 9/22/2021 and to be accomplished within 3-5 day(s)  1. Patient will move from supine <> sit with indepedence in prep for out of bed activity and change of position. 2.  Patient will perform sit<> stand with independence in prep for transfers/ambulation. 3.  Patient will ambulate 250 feet with mod I/I with LRAD for improved functional mobility at discharge. 5.  Patient will ascend/descend flight of stairs with handrail(s) with mod I for home navigation at discharge. Outcome: Progressing Towards Goal  PHYSICAL THERAPY EVALUATION    Patient: Remy Barnhart (6 y.o. female)  Date: 9/22/2021  Primary Diagnosis: Numbness and tingling [R20.0, R20.2]  Precautions:   Fall  PLOF: independent in functional mobility w/o PTA; reports h/o fall couple weeks prior and hitting head w/o LOC. No other falls reported. ASSESSMENT :  Based on the objective data described below, the patient is seen on telemetry unit following admission with above dx. Evaluation completed with OT at this time. Pt presents with ROM/motor performance BLE's grossly decrd, but functional and decr'd  LT L5 dermatome bilat lateral lower legs. Pt presents with decr'd independence in functional mobility as compared to usual function. Pt demonstrates supine to sit EOB and transfers with mod I. Able to participate with GT/S/CGA  in allen. Decr'd luciana and c/o dizziness MCC. Required vc/CGA/S for safe return to room. Pt left back in bed in NAD. Pt c/o \"neurological\" pain and numbness and tingling bilat hands t/o session. Pt left with all needs in reach, and nurse Chauncey Ortiz notified of above. Pt educated regarding IPPT POC and safe mobility. Recommendation for HHPT at discharge. Patient will benefit from skilled intervention to address the above impairments.     Pt Education: Role of physical therapy in acute care setting, fall prevention and safety/technique during functional mobility tasks    Patient's rehabilitation potential is considered to be Fair  Factors which may influence rehabilitation potential include:   []         None noted  []         Mental ability/status  [x]         Medical condition  [x]         Home/family situation and support systems  []         Safety awareness  [x]         Pain tolerance/management  []         Other:      PLAN :  Recommendations and Planned Interventions:   [x]           Bed Mobility Training             [x]    Neuromuscular Re-Education  [x]           Transfer Training                   []    Orthotic/Prosthetic Training  [x]           Gait Training                          []    Modalities  [x]           Therapeutic Exercises           []    Edema Management/Control  [x]           Therapeutic Activities            []    Family Training/Education  [x]           Patient Education  []           Other (comment):    Frequency/Duration: Patient will be followed by physical therapy 1-2 times per day/4-7 days per week to address goals. Discharge Recommendations: Home Physical Therapy  Further Equipment Recommendations for Discharge: N/A     SUBJECTIVE:   Patient stated I have neurological pain.     OBJECTIVE DATA SUMMARY:     Past Medical History:   Diagnosis Date    Anxiety     Arrhythmia     Chronic pain     Dysuria     Gastrointestinal disorder     colitis    Gross hematuria     Heart murmur     Hematuria     HPV (human papilloma virus) infection     Hypertension     Hypothyroidism     Ischemic colitis ([facility] [address]     MS (multiple sclerosis) (HCC)     OCD (obsessive compulsive disorder)     Other ill-defined conditions(799.89)     prescription drug addiction    Pain management     Psychiatric disorder     depression, bipolar anxiety, ocd    Rapid heart rate     Seizures (HCC)     Tattoo     Tattoos    Vitamin D deficiency     Vulvar pain      Past Surgical History:   Procedure Laterality Date    HX ORTHOPAEDIC      back surgery, ulnar nerve    HX ORTHOPAEDIC      back surgery x 2    HX OTHER SURGICAL      ulner nerve surgery    HX TUBAL LIGATION       Barriers to Learning/Limitations: None  Compensate with: N/A  Home Situation:  Home Situation  Home Environment: Apartment  # Steps to Enter: 0  One/Two Story Residence: Two story  # of Interior Steps: 15  Interior Rails: Left  Lift Chair Available: No  Living Alone: No  Support Systems: Child(amanda), Other Family Member(s)  Patient Expects to be Discharged to[de-identified] Apartment  Current DME Used/Available at Home: Cane, straight, Walker, rolling  Tub or Shower Type: Tub/Shower combination  Critical Behavior:  Neurologic State: Alert  Orientation Level: Oriented X4  Cognition: Appropriate for age attention/concentration; Follows commands  Safety/Judgement: Fall prevention  Psychosocial  Patient Behaviors: Anxious; Cooperative  Purposeful Interaction: Yes  Pt Identified Daily Priority: Clinical issues (comment)  Caritas Process: Nurture loving kindness;Enable damion/hope;Establish trust;Nurture spiritual self; Attend basic human needs; Teaching/learning;Create healing environment;Supportive expression;Creative use of self  Caring Interventions: Reassure; Therapeutic modalities  Reassure: Therapeutic listening; Informing; Acceptance;Caring rounds;Quiet presence; Instilling damion and hope  Therapeutic Modalities: Deep breathing; Intentional therapeutic touch  Skin Condition/Temp: Warm;Dry  Skin Integrity: Intact; Tattoos (comment); Scars (comment)  Skin Integumentary  Skin Color: Appropriate for ethnicity  Skin Condition/Temp: Warm;Dry  Skin Integrity: Intact; Tattoos (comment); Scars (comment)  Turgor: Non-tenting  Hair Growth: Present  Varicosities: Absent  Nails: Within Defined Limits  Strength:    Strength: Generally decreased, functional  Tone & Sensation:   Tone: Normal  Sensation: Impaired (numbness and tingling)  Range Of Motion:  AROM: Generally decreased, functional  PROM: Generally decreased, functional  Functional Mobility:  Bed Mobility:  Rolling: Modified independent  Supine to Sit: Modified independent  Sit to Supine: Modified independent  Scooting: Supervision  Transfers:  Sit to Stand: Modified independent  Stand to Sit: Modified independent  Balance:   Sitting: Intact  Standing: Intact; Without support  Ambulation/Gait Training:  Distance (ft): 220 Feet (ft)  Assistive Device: Gait belt  Ambulation - Level of Assistance: Supervision;Contact guard assistance  Gait Description (WDL): Exceptions to WDL  Gait Abnormalities: Decreased step clearance  Speed/Christi: Pace decreased (<100 feet/min)  Interventions: Safety awareness training  Pain:  Pain level pre-treatment: 7/10   Pain level post-treatment: 7/10   Pain Intervention(s) : Medication (see MAR); Rest, Ice, Repositioning  Response to intervention: Nurse notified, See doc flow  Activity Tolerance:   Fair   Please refer to the flowsheet for vital signs taken during this treatment. After treatment:   []         Patient left in no apparent distress sitting up in chair  [x]         Patient left in no apparent distress in bed  [x]         Call bell left within reach  [x]         Nursing notified-Scarlet  []         Caregiver present  []         Bed alarm activated  []         SCDs applied    COMMUNICATION/EDUCATION:   [x]         Role of Physical Therapy in the acute care setting. [x]         Fall prevention education was provided and the patient/caregiver indicated understanding. [x]         Patient/family have participated as able in goal setting and plan of care. [x]         Patient/family agree to work toward stated goals and plan of care. []         Patient understands intent and goals of therapy, but is neutral about his/her participation. []         Patient is unable to participate in goal setting/plan of care: ongoing with therapy staff.  []         Other:     Thank you for this referral.  Fatimah Degroot, PT   Time Calculation: 20 mins      Eval Complexity: History: HIGH Complexity :3+ comorbidities / personal factors will impact the outcome/ POC Exam:HIGH Complexity : 4+ Standardized tests and measures addressing body structure, function, activity limitation and / or participation in recreation  Presentation: MEDIUM Complexity : Evolving with changing characteristics  Clinical Decision Making:Medium Complexity    Overall Complexity:MEDIUM

## 2021-09-22 NOTE — PROGRESS NOTES
Problem: Self Care Deficits Care Plan (Adult)  Goal: *Acute Goals and Plan of Care (Insert Text)  Description: Occupational Therapy Goals  Initiated 9/22/2021 within 3 day(s). 1.  Patient will perform grooming with independence standing at sink for 5 minutes or more. 2.  Patient will perform lower body dressing with independence. 3.  Patient will perform toilet transfers with independence. 4.  Patient will perform all aspects of toileting with independence. 5.  Patient will participate in upper extremity therapeutic exercise/activities with independence for 10 minutes. 6.  Patient will utilize energy conservation techniques during functional activities with verbal, visual, and tactile cues. OCCUPATIONAL THERAPY EVALUATION    Patient: Roya Choe (75 y.o. female)  Date: 9/22/2021  Primary Diagnosis: Numbness and tingling [R20.0, R20.2]        Precautions:   Fall  PLOF: independent with ADLs and transfers     ASSESSMENT :  Based on the objective data described below, the patient presents with decreased strength, endurance and balance for carryover of ADLs and transfers with safety following above mentioned medical diagnosis. Pt presented supine in bed, tearful at the beginning of session. C/o constant generalized pain due to pre-existing neurological issues. Pt participate with supine<>sit, sit<>stand transfers, UB and LB dressing, and ambulate in hallway with S-mod I. Pt was left supine in bed at the end of session in NAD. Pt reports recent fall few months back where she hit her head at a corner of the wall but no other major falls reported. Pt reports feeling lightheaded/dizzy with change in position and walking during this session but no signs of LOB noticed. Pt education on safety with transfers and ADLs and fall prevention techniques for the same. Pt verbalized understanding for the same. Patient will benefit from skilled intervention to address the above impairments.   Patient's rehabilitation potential is considered to be Good  Factors which may influence rehabilitation potential include:   []             None noted  []             Mental ability/status  []             Medical condition  []             Home/family situation and support systems  []             Safety awareness  []             Pain tolerance/management  []             Other:      PLAN :  Recommendations and Planned Interventions:   [x]               Self Care Training                  [x]      Therapeutic Activities  [x]               Functional Mobility Training   []      Cognitive Retraining  [x]               Therapeutic Exercises           [x]      Endurance Activities  [x]               Balance Training                    []      Neuromuscular Re-Education  []               Visual/Perceptual Training     [x]      Home Safety Training  [x]               Patient Education                   [x]      Family Training/Education  []               Other (comment):    Frequency/Duration: Patient will be followed by occupational therapy 3 times a week to address goals. Discharge Recommendations: Rehab vs Home Health  Further Equipment Recommendations for Discharge: N/A     SUBJECTIVE:   Patient stated  I don;t understand why nobody is addressing the real issue here.     OBJECTIVE DATA SUMMARY:     Past Medical History:   Diagnosis Date    Anxiety     Arrhythmia     Chronic pain     Dysuria     Gastrointestinal disorder     colitis    Gross hematuria     Heart murmur     Hematuria     HPV (human papilloma virus) infection     Hypertension     Hypothyroidism     Ischemic colitis ([facility] [address]     MS (multiple sclerosis) (HCC)     OCD (obsessive compulsive disorder)     Other ill-defined conditions(799.89)     prescription drug addiction    Pain management     Psychiatric disorder     depression, bipolar anxiety, ocd    Rapid heart rate     Seizures (HCC)     Tattoo     Tattoos    Vitamin D deficiency     Vulvar pain      Past Surgical History:   Procedure Laterality Date    HX ORTHOPAEDIC      back surgery, ulnar nerve    HX ORTHOPAEDIC      back surgery x 2    HX OTHER SURGICAL      ulner nerve surgery    HX TUBAL LIGATION       Barriers to Learning/Limitations: None  Compensate with: visual, verbal, tactile, kinesthetic cues/model    Home Situation:   Home Situation  Home Environment: Apartment  # Steps to Enter: 0  One/Two Story Residence: Two story  # of Interior Steps: 15  Interior Rails: Left  Lift Chair Available: No  Living Alone: No  Support Systems: Child(amanda), Other Family Member(s)  Patient Expects to be Discharged to[de-identified] Apartment  Current DME Used/Available at Home: Cane, straight, Walker, rolling  Tub or Shower Type: Tub/Shower combination  []  Right hand dominant   []  Left hand dominant    Cognitive/Behavioral Status:  Neurologic State: Alert  Orientation Level: Oriented X4  Cognition: Appropriate for age attention/concentration; Follows commands  Safety/Judgement: Fall prevention    Skin: intact  Edema: none    Vision/Perceptual:    Tracking: Able to track stimulus in all quadrants w/o difficulty    Coordination: BUE  Coordination: Generally decreased, functional  Fine Motor Skills-Upper: Left Intact; Right Intact    Gross Motor Skills-Upper: Left Intact; Right Intact  Balance:  Sitting: Intact  Standing: Intact; Without support  Strength: BUE  Strength: Generally decreased, functional  Tone & Sensation: BUE  Tone: Normal  Sensation: Impaired (numbness and tingling)  Range of Motion: BUE  AROM: Generally decreased, functional  Functional Mobility and Transfers for ADLs:  Bed Mobility:  Rolling: Modified independent  Supine to Sit: Modified independent  Sit to Supine: Modified independent  Scooting: Supervision  Transfers:  Sit to Stand: Modified independent  Stand to Sit: Modified independent  ADL Assessment:   Feeding: Independent    Oral Facial Hygiene/Grooming: Modified Independent    Upper Body Dressing: Independent    Lower Body Dressing: Independent    Toileting: Modified independent  ADL Intervention:  Upper Body Dressing Assistance  Dressing Assistance: [id number]: Independent    Lower Body Dressing Assistance  Dressing Assistance: Modified independent  Pants With Button/Zipper: Modified independent  Leg Crossed Method Used: Yes  Position Performed: Seated edge of bed  Cues: Doff    Cognitive Retraining  Safety/Judgement: Fall prevention  Pain:  Pain level pre-treatment: 7/10   Pain level post-treatment: 7/10   Pain Intervention(s): Medication (see MAR); Rest, Ice, Repositioning   Response to intervention: Nurse notified, See doc flow    Activity Tolerance:   Fair     Please refer to the flowsheet for vital signs taken during this treatment. After treatment:   [] Patient left in no apparent distress sitting up in chair  [x] Patient left in no apparent distress in bed  [x] Call bell left within reach  [x] Nursing notified  [] Caregiver present  [] Bed alarm activated    COMMUNICATION/EDUCATION:   [x] Role of Occupational Therapy in the acute care setting  [x] Home safety education was provided and the patient/caregiver indicated understanding. [x] Patient/family have participated as able in goal setting and plan of care. [x] Patient/family agree to work toward stated goals and plan of care. [] Patient understands intent and goals of therapy, but is neutral about his/her participation. [] Patient is unable to participate in goal setting and plan of care. Thank you for this referral.  Kaylyn Kurtz OTR/L  Time Calculation: 25 mins    Eval Complexity: History: LOW Complexity : Brief history review ; Examination: LOW Complexity : 1-3 performance deficits relating to physical, cognitive , or psychosocial skils that result in activity limitations and / or participation restrictions ;    Decision Making:LOW Complexity : No comorbidities that affect functional and no verbal or physical assistance needed to complete eval tasks

## 2021-09-22 NOTE — PROGRESS NOTES
Care Management    Reason for Admission: Left facial numbness    Chart reviewed. Per H&P: Dominique Barajas is a 55 y.o. female with a history of depression, generalized anxiety, chronic pain, ongoing nonspecific neurological complaints presents to the emergency room complaining of numbness and tingling in her upper extremities. She states that she woke up this morning and noticed that the left side of her face was numb and her speech was delayed. By the time she arrived to the emergency room face felt back to best baseline but now complains of bilateral upper extremity numbness. She states that she did have rather complicated tooth extraction a week ago and wonders if some of the numbness in her face have to do with her dental surgery. She also reports having sharp spasms in her neck and having to lean her head forward but she says this is chronic for her. Patient also verbalized very concerned that she might have caught cauda equina syndrome because she states that she has numbness in her groin. Because the patient's initial complaints Code S was called in the ED, however teleneurology did not think that she was having stroke symptoms and obviously patient was not a TPA candidate. CT head was found to be unremarkable and CTA was unremarkable for large vessel occlusion. Teleneurology recommended admission for MRI. \"    [id number]: CM stopped by the patient's room to speak with her, patient currently off the floor for testing. Care Management/Patient Conversation: 1500: Chart reviewed. CM spoke with the patient and informed her of the CM's role. The patient confirmed demographics and PCP. CM and the patient discussed discharge plans to include transportation upon discharge, DME, family support, and transportation to and from appointments. The patient reports that she lives at home with her daughter, using either a cane or walker PRN.  The patient reports that she does not drive and relies on family or medicaid [employer] for transportation. The patient reports that a family member should be available to pick her up when she is discharge as long as it is not too late in the day. The patient denies any questions or concerns at this time. CM provided will continue to follow the patient's progress and be available to assist with discharge planning as needed. CMS referral placed. Prior to admission patient was living: Home with daughter    Prior to admission patient was using the following DME: Cane or walker PRN                   RUR Score: NA                   Plan for utilizing home health: TBD         COVID Vaccine Status: Unvaccinated    PCP: First and Last name: Kaushik Goldstein MD    Name of Practice:    Are you a current patient: Yes/No: Yes   Approximate date of last visit: Has upcoming visit in October   Can you participate in a virtual visit with your PCP:                     Current Advanced Directive/Advance Care Plan: Full Code no ACP docs on file    Healthcare Decision Maker: Mother: Eloisa Rollins: [phone]                         Transition of Care Plan: In progress       Care Management Interventions  PCP Verified by CM: Yes  Mode of Transport at Discharge:  Other (see comment) (family)  Transition of Care Consult (CM Consult): Discharge Planning  Support Systems: Parent(s), Child(amanda)  Confirm Follow Up Transport: Family  The Plan for Transition of Care is Related to the Following Treatment Goals : Left facial numbness  Discharge Location  Discharge Placement: Home with family assistance

## 2022-02-15 ENCOUNTER — HOSPITAL ENCOUNTER (EMERGENCY)
Age: 47
Discharge: HOME | End: 2022-02-15
Attending: EMERGENCY MEDICINE
Payer: MEDICAID

## 2022-02-15 VITALS
TEMPERATURE: 98 F | RESPIRATION RATE: 11 BRPM | DIASTOLIC BLOOD PRESSURE: 52 MMHG | HEIGHT: 64 IN | WEIGHT: 140 LBS | BODY MASS INDEX: 23.9 KG/M2 | HEART RATE: 93 BPM | OXYGEN SATURATION: 100 % | SYSTOLIC BLOOD PRESSURE: 102 MMHG

## 2022-02-15 DIAGNOSIS — R11.2 NAUSEA AND VOMITING, INTRACTABILITY OF VOMITING NOT SPECIFIED, UNSPECIFIED VOMITING TYPE: Primary | ICD-10-CM

## 2022-02-15 LAB
ALBUMIN SERPL-MCNC: 3.7 G/DL (ref 3.4–5)
ALBUMIN/GLOB SERPL: 1.3 {RATIO} (ref 0.8–1.7)
ALP SERPL-CCNC: 69 U/L (ref 45–117)
ALT SERPL-CCNC: 27 U/L (ref 13–56)
ANION GAP SERPL CALC-SCNC: 5 MMOL/L (ref 3–18)
APPEARANCE UR: CLEAR
AST SERPL-CCNC: 17 U/L (ref 10–38)
BASOPHILS # BLD: 0 K/UL (ref 0–0.1)
BASOPHILS NFR BLD: 0 % (ref 0–2)
BILIRUB SERPL-MCNC: 1.4 MG/DL (ref 0.2–1)
BILIRUB UR QL: NEGATIVE
BUN SERPL-MCNC: 12 MG/DL (ref 7–18)
BUN/CREAT SERPL: 20 (ref 12–20)
CALCIUM SERPL-MCNC: 9.1 MG/DL (ref 8.5–10.1)
CHLORIDE SERPL-SCNC: 108 MMOL/L (ref 100–111)
CO2 SERPL-SCNC: 28 MMOL/L (ref 21–32)
COLOR UR: YELLOW
CREAT SERPL-MCNC: 0.6 MG/DL (ref 0.6–1.3)
DIFFERENTIAL METHOD BLD: NORMAL
EOSINOPHIL # BLD: 0.1 K/UL (ref 0–0.4)
EOSINOPHIL NFR BLD: 1 % (ref 0–5)
ERYTHROCYTE [DISTWIDTH] IN BLOOD BY AUTOMATED COUNT: 12.1 % (ref 11.6–14.5)
GLOBULIN SER CALC-MCNC: 2.9 G/DL (ref 2–4)
GLUCOSE SERPL-MCNC: 86 MG/DL (ref 74–99)
GLUCOSE UR STRIP.AUTO-MCNC: NEGATIVE MG/DL
HCG UR QL: NEGATIVE
HCT VFR BLD AUTO: 40.5 % (ref 35–45)
HGB BLD-MCNC: 13.6 G/DL (ref 12–16)
HGB UR QL STRIP: NEGATIVE
IMM GRANULOCYTES # BLD AUTO: 0 K/UL (ref 0–0.04)
IMM GRANULOCYTES NFR BLD AUTO: 0 % (ref 0–0.5)
KETONES UR QL STRIP.AUTO: NEGATIVE MG/DL
LEUKOCYTE ESTERASE UR QL STRIP.AUTO: NEGATIVE
LIPASE SERPL-CCNC: 106 U/L (ref 73–393)
LYMPHOCYTES # BLD: 2.1 K/UL (ref 0.9–3.6)
LYMPHOCYTES NFR BLD: 25 % (ref 21–52)
MAGNESIUM SERPL-MCNC: 2 MG/DL (ref 1.6–2.6)
MCH RBC QN AUTO: 28.9 PG (ref 24–34)
MCHC RBC AUTO-ENTMCNC: 33.6 G/DL (ref 31–37)
MCV RBC AUTO: 86.2 FL (ref 78–100)
MONOCYTES # BLD: 0.7 K/UL (ref 0.05–1.2)
MONOCYTES NFR BLD: 9 % (ref 3–10)
NEUTS SEG # BLD: 5.4 K/UL (ref 1.8–8)
NEUTS SEG NFR BLD: 65 % (ref 40–73)
NITRITE UR QL STRIP.AUTO: NEGATIVE
NRBC # BLD: 0 K/UL (ref 0–0.01)
NRBC BLD-RTO: 0 PER 100 WBC
PH UR STRIP: 6 [PH] (ref 5–8)
PLATELET # BLD AUTO: 273 K/UL (ref 135–420)
PMV BLD AUTO: 10.4 FL (ref 9.2–11.8)
POTASSIUM SERPL-SCNC: 3.9 MMOL/L (ref 3.5–5.5)
PROT SERPL-MCNC: 6.6 G/DL (ref 6.4–8.2)
PROT UR STRIP-MCNC: NEGATIVE MG/DL
RBC # BLD AUTO: 4.7 M/UL (ref 4.2–5.3)
SODIUM SERPL-SCNC: 141 MMOL/L (ref 136–145)
SP GR UR REFRACTOMETRY: 1.01 (ref 1–1.03)
UROBILINOGEN UR QL STRIP.AUTO: 0.2 EU/DL (ref 0.2–1)
WBC # BLD AUTO: 8.3 K/UL (ref 4.6–13.2)

## 2022-02-15 PROCEDURE — 81025 URINE PREGNANCY TEST: CPT

## 2022-02-15 PROCEDURE — 83735 ASSAY OF MAGNESIUM: CPT

## 2022-02-15 PROCEDURE — 93005 ELECTROCARDIOGRAM TRACING: CPT

## 2022-02-15 PROCEDURE — 85025 COMPLETE CBC W/AUTO DIFF WBC: CPT

## 2022-02-15 PROCEDURE — 99285 EMERGENCY DEPT VISIT HI MDM: CPT

## 2022-02-15 PROCEDURE — 80053 COMPREHEN METABOLIC PANEL: CPT

## 2022-02-15 PROCEDURE — 83690 ASSAY OF LIPASE: CPT

## 2022-02-15 PROCEDURE — 81003 URINALYSIS AUTO W/O SCOPE: CPT

## 2022-02-15 RX ORDER — ONDANSETRON 8 MG/1
8 TABLET, ORALLY DISINTEGRATING ORAL
Qty: 12 TABLET | Refills: 0 | Status: SHIPPED | OUTPATIENT
Start: 2022-02-15

## 2022-02-15 NOTE — ED PROVIDER NOTES
66-year-old female with past medical history of anxiety, bipolar disorder, multiple sclerosis, anxiety, and palpitations presents emergency department for 2 weeks of nausea vomiting and heart palpitations. She states only medications taking right now is propanolol. She has not been seen by any other doctor for this.            Past Medical History:   Diagnosis Date    Anxiety     Arrhythmia     Chronic pain     Dysuria     Gastrointestinal disorder     colitis    Gross hematuria     Heart murmur     Hematuria     HPV (human papilloma virus) infection     Hypertension     Hypothyroidism     Ischemic colitis ([facility] [address]     MS (multiple sclerosis) (HCC)     OCD (obsessive compulsive disorder)     Other ill-defined conditions(799.89)     prescription drug addiction    Pain management     Psychiatric disorder     depression, bipolar anxiety, ocd    Rapid heart rate     Seizures (HCC)     Tattoo     Tattoos    Vitamin D deficiency     Vulvar pain        Past Surgical History:   Procedure Laterality Date    HX ORTHOPAEDIC      back surgery, ulnar nerve    HX ORTHOPAEDIC      back surgery x 2    HX OTHER SURGICAL      ulner nerve surgery    HX TUBAL LIGATION           Family History:   Problem Relation Age of Onset    Diabetes Mother     Cancer Maternal Grandmother     Breast Cancer Maternal Aunt     Thyroid Disease Maternal Aunt        Social History     Socioeconomic History    Marital status: SINGLE     Spouse name: Not on file    Number of children: Not on file    Years of education: Not on file    Highest education level: Not on file   Occupational History    Not on file   Tobacco Use    Smoking status: Former Smoker     Packs/day: 1.50     Types: Cigarettes     Quit date: 7/10/2017     Years since quittin.6    Smokeless tobacco: Never Used    Tobacco comment: pt stated she quit 2 weeks ago   Substance and Sexual Activity    Alcohol use: No     Comment: occasionally    Drug use: No    Sexual activity: Not Currently   Other Topics Concern    Dental Braces Not Asked    Endoscopic Camera Pill Not Asked    Metallic Foreign Body Not Asked    Medication Patches Not Asked    Taking Feraheme Not Asked    Claustrophobic Not Asked    Removable Dental Work Not Asked    Hearing Aids Not Asked    Body Piercing Not Asked    Radiation Seeds Not Asked    Pregnant or Breast Feeding Not Asked    Wounded by Shrapnel or Bullet Not Asked    Other-See Comment Not Asked    Other Implant-See Comment Not Asked   Social History Narrative    ** Merged History Encounter **          Social Determinants of Health     Financial Resource Strain:     Difficulty of Paying Living Expenses: Not on file   Food Insecurity:     Worried About Running Out of Food in the Last Year: Not on file    Lisa of Food in the Last Year: Not on file   Transportation Needs:     Lack of Transportation (Medical): Not on file    Lack of Transportation (Non-Medical):  Not on file   Physical Activity:     Days of Exercise per Week: Not on file    Minutes of Exercise per Session: Not on file   Stress:     Feeling of Stress : Not on file   Social Connections:     Frequency of Communication with Friends and Family: Not on file    Frequency of Social Gatherings with Friends and Family: Not on file    Attends Gnosticist Services: Not on file    Active Member of [address] [place] [address] [employer] or Organizations: Not on file    Attends Club or Organization Meetings: Not on file    Marital Status: Not on file   Intimate Partner Violence:     Fear of Current or Ex-Partner: Not on file    Emotionally Abused: Not on file    Physically Abused: Not on file    Sexually Abused: Not on file   Housing Stability:     Unable to Pay for Housing in the Last Year: Not on file    Number of [place] in the Last Year: Not on file    Unstable Housing in the Last Year: Not on file         ALLERGIES: Ciprofloxacin, Sulfatrim ds, Contrast dye [iodine], Doxycycline, Sulfa (sulfonamide antibiotics), Sulfa (sulfonamide antibiotics), and Penicillins    Review of Systems   Constitutional: Positive for activity change, appetite change and fatigue. Negative for chills, diaphoresis, fever and unexpected weight change. HENT: Negative. Eyes: Negative. Respiratory: Negative. Cardiovascular: Positive for palpitations. Negative for chest pain and leg swelling. Gastrointestinal: Positive for abdominal pain, nausea and vomiting. Negative for blood in stool, constipation and diarrhea. Endocrine: Negative. Genitourinary: Negative. Musculoskeletal: Positive for back pain and myalgias. Negative for arthralgias, gait problem, joint swelling, neck pain and neck stiffness. Skin: Negative. Neurological: Negative. Hematological: Negative. Psychiatric/Behavioral: Negative. Vitals:    02/15/22 1239 02/15/22 1250 02/15/22 1254 02/15/22 1324   BP: (!) 111/55  98/64    Pulse:  78 83 68   Resp:  22 14 19   Temp:       SpO2: 100% 100% 100% 97%   Weight:       Height:                Physical Exam  Vitals and nursing note reviewed. Constitutional:       General: She is not in acute distress. Appearance: She is well-developed and normal weight. She is not ill-appearing, toxic-appearing or diaphoretic. HENT:      Head: Normocephalic and atraumatic. Cardiovascular:      Rate and Rhythm: Normal rate and regular rhythm. Heart sounds: No murmur heard. No friction rub. No gallop. Pulmonary:      Effort: Pulmonary effort is normal. No respiratory distress. Breath sounds: Normal breath sounds. Abdominal:      General: Abdomen is flat. Bowel sounds are normal.      Palpations: Abdomen is soft. There is no shifting dullness, fluid wave, hepatomegaly or splenomegaly. Tenderness: There is generalized abdominal tenderness. Hernia: No hernia is present. Skin:     Capillary Refill: Capillary refill takes less than 2 seconds. HTN (hypertension) Neurological:      General: No focal deficit present. Mental Status: She is alert and oriented to person, place, and time. Psychiatric:         Mood and Affect: Mood normal.         Behavior: Behavior normal.          MDM  Number of Diagnoses or Management Options  Nausea and vomiting, intractability of vomiting not specified, unspecified vomiting type  Diagnosis management comments: 49-year-old female with significant past medical history of anxiety, chronic pain syndrome, bipolar disorder, HPV, hypertension, hypothyroidism, mass, and OCD presents emergency department stating that she has had nausea vomiting abdominal pain with eating for the last 4 months. She arrived to the emergency department in no acute distress no tachycardia no hypotension no fever speaking in full sentences respirations 11 oxygen saturation 100% on room air. On exam on arrival patient has a bit of a bizarre affect, lungs are clear to auscultation bilaterally heart sounds are unremarkable abdomen is soft however patient is complaining of generalized abdominal pain in the out the entire abdomen. No lower extremity edema heart sounds were unremarkable for murmurs gallops rubs lungs were clear auscultation. No gross neurologic deficit as tested. Patient had multiple other complaints during her initial exam including worry that her mastoids were infected, TMs are clear bilaterally canals were clear bilaterally there is no redness or erythema bilaterally. Also that her shoulder on the left side kept dislocating from her neck. When the patient of the shoulder does not actually connect the neck but on examination she had no collarbone involvement she had no deformity PMI is in the bilateral upper extremities were unremarkable. Patient having very nonspecific but persistent complaints, IV started and basic labs were sent to evaluate any possible abdominal pathology.   Initial labs were lost by the laboratory and had to be recent adding to patient's weight time. Return of labs were largely unremarkable urine is not infected. Patient is not pregnant. There is no overt white count. No significant anemia. LFTs were unremarkable except for a mild 1.4 bilirubin the patient has no signs or symptoms of jaundice again she had no right upper quadrant pain. Just generalized abdominal pain. Findings were discussed with patient who asked for a battery of other exams including CAT scans MRIs she wanted an in-house EGD done by GI as well as x-rays. Explained to patient that there were no emergent conditions found and while I do think it is appropriate to have her follow-up with GI for persistent epigastric pain with nausea there is no emergent condition to have her scoped additionally do not feel that CT abdomen would give me any further insight into her complaints she had no specific abdominal pain with relatively normal labs. Do not suspect acute appendicitis do not suspect acute diverticulitis do not suspect cholecystitis. Offered patient to treat her for acid reflux possibly related to a gastritis that she was symptomatically experiencing and give her medication for nausea. I spent a considerable amount of time with this patient explaining to her but no emergent condition was found however suspect due to her underlying bipolar disorder and OCD that she has become obsessive with her symptoms and there was no other reason that could be done to make this patient happy, she was given referral instructions for GI and her PCP and was told if her symptoms did worsen at all to come back to the emergency room where she can be reevaluated at any time.          Procedures

## 2022-02-15 NOTE — ED TRIAGE NOTES
Pt states she has abd pain and pelvic pain for several weeks; Worsened over last few days;   Unable to eat, cant keep food or liquids down;  Losing weight and has not appetite  Also feels like she is having palpitations;

## 2022-02-16 LAB
ATRIAL RATE: 77 BPM
CALCULATED P AXIS, ECG09: 69 DEGREES
CALCULATED R AXIS, ECG10: 45 DEGREES
CALCULATED T AXIS, ECG11: 61 DEGREES
DIAGNOSIS, 93000: NORMAL
P-R INTERVAL, ECG05: 178 MS
Q-T INTERVAL, ECG07: 354 MS
QRS DURATION, ECG06: 76 MS
QTC CALCULATION (BEZET), ECG08: 400 MS
VENTRICULAR RATE, ECG03: 77 BPM

## 2022-02-18 ENCOUNTER — APPOINTMENT (OUTPATIENT)
Dept: GENERAL RADIOLOGY | Age: 47
End: 2022-02-18
Attending: EMERGENCY MEDICINE
Payer: MEDICAID

## 2022-02-18 ENCOUNTER — APPOINTMENT (OUTPATIENT)
Dept: CT IMAGING | Age: 47
End: 2022-02-18
Attending: EMERGENCY MEDICINE
Payer: MEDICAID

## 2022-02-18 ENCOUNTER — HOSPITAL ENCOUNTER (EMERGENCY)
Age: 47
Discharge: HOME | End: 2022-02-18
Attending: EMERGENCY MEDICINE
Payer: MEDICAID

## 2022-02-18 VITALS
DIASTOLIC BLOOD PRESSURE: 74 MMHG | BODY MASS INDEX: 23.9 KG/M2 | HEART RATE: 93 BPM | WEIGHT: 140 LBS | HEIGHT: 64 IN | TEMPERATURE: 98 F | RESPIRATION RATE: 18 BRPM | OXYGEN SATURATION: 100 % | SYSTOLIC BLOOD PRESSURE: 108 MMHG

## 2022-02-18 DIAGNOSIS — R11.15 CYCLIC VOMITING SYNDROME: ICD-10-CM

## 2022-02-18 DIAGNOSIS — R63.0 ANOREXIA: Primary | ICD-10-CM

## 2022-02-18 DIAGNOSIS — R11.2 NON-INTRACTABLE VOMITING WITH NAUSEA, UNSPECIFIED VOMITING TYPE: ICD-10-CM

## 2022-02-18 LAB
ALBUMIN SERPL-MCNC: 4.8 G/DL (ref 3.4–5)
ALBUMIN/GLOB SERPL: 1.5 {RATIO} (ref 0.8–1.7)
ALP SERPL-CCNC: 83 U/L (ref 45–117)
ALT SERPL-CCNC: 29 U/L (ref 13–56)
ANION GAP SERPL CALC-SCNC: 1 MMOL/L (ref 3–18)
APAP SERPL-MCNC: <2 UG/ML (ref 10–30)
APPEARANCE UR: CLEAR
AST SERPL-CCNC: 15 U/L (ref 10–38)
ATRIAL RATE: 73 BPM
BACTERIA URNS QL MICRO: ABNORMAL /HPF
BASOPHILS # BLD: 0.1 K/UL (ref 0–0.1)
BASOPHILS NFR BLD: 1 % (ref 0–2)
BILIRUB SERPL-MCNC: 2.2 MG/DL (ref 0.2–1)
BILIRUB UR QL: NEGATIVE
BNP SERPL-MCNC: 14 PG/ML (ref 0–450)
BUN SERPL-MCNC: 12 MG/DL (ref 7–18)
BUN/CREAT SERPL: 15 (ref 12–20)
CALCIUM SERPL-MCNC: 10 MG/DL (ref 8.5–10.1)
CALCULATED P AXIS, ECG09: 69 DEGREES
CALCULATED R AXIS, ECG10: 49 DEGREES
CALCULATED T AXIS, ECG11: 61 DEGREES
CHLORIDE SERPL-SCNC: 107 MMOL/L (ref 100–111)
CO2 SERPL-SCNC: 31 MMOL/L (ref 21–32)
COLOR UR: YELLOW
CREAT SERPL-MCNC: 0.82 MG/DL (ref 0.6–1.3)
DIAGNOSIS, 93000: NORMAL
DIFFERENTIAL METHOD BLD: ABNORMAL
EOSINOPHIL # BLD: 0.1 K/UL (ref 0–0.4)
EOSINOPHIL NFR BLD: 2 % (ref 0–5)
EPITH CASTS URNS QL MICRO: ABNORMAL /LPF (ref 0–5)
ERYTHROCYTE [DISTWIDTH] IN BLOOD BY AUTOMATED COUNT: 12.2 % (ref 11.6–14.5)
ETHANOL SERPL-MCNC: <3 MG/DL (ref 0–3)
GLOBULIN SER CALC-MCNC: 3.1 G/DL (ref 2–4)
GLUCOSE SERPL-MCNC: 124 MG/DL (ref 74–99)
GLUCOSE UR STRIP.AUTO-MCNC: NEGATIVE MG/DL
HCG UR QL: NEGATIVE
HCT VFR BLD AUTO: 47.7 % (ref 35–45)
HGB BLD-MCNC: 16.2 G/DL (ref 12–16)
HGB UR QL STRIP: NEGATIVE
IMM GRANULOCYTES # BLD AUTO: 0 K/UL (ref 0–0.04)
IMM GRANULOCYTES NFR BLD AUTO: 0 % (ref 0–0.5)
KETONES UR QL STRIP.AUTO: ABNORMAL MG/DL
LACTATE SERPL-SCNC: 0.9 MMOL/L (ref 0.4–2)
LEUKOCYTE ESTERASE UR QL STRIP.AUTO: ABNORMAL
LIPASE SERPL-CCNC: 129 U/L (ref 73–393)
LYMPHOCYTES # BLD: 2.1 K/UL (ref 0.9–3.6)
LYMPHOCYTES NFR BLD: 27 % (ref 21–52)
MAGNESIUM SERPL-MCNC: 2.1 MG/DL (ref 1.6–2.6)
MCH RBC QN AUTO: 29.2 PG (ref 24–34)
MCHC RBC AUTO-ENTMCNC: 34 G/DL (ref 31–37)
MCV RBC AUTO: 86.1 FL (ref 78–100)
MONOCYTES # BLD: 0.7 K/UL (ref 0.05–1.2)
MONOCYTES NFR BLD: 9 % (ref 3–10)
NEUTS SEG # BLD: 4.7 K/UL (ref 1.8–8)
NEUTS SEG NFR BLD: 61 % (ref 40–73)
NITRITE UR QL STRIP.AUTO: NEGATIVE
NRBC # BLD: 0 K/UL (ref 0–0.01)
NRBC BLD-RTO: 0 PER 100 WBC
P-R INTERVAL, ECG05: 164 MS
PH UR STRIP: 5 [PH] (ref 5–8)
PLATELET # BLD AUTO: 318 K/UL (ref 135–420)
PMV BLD AUTO: 10.4 FL (ref 9.2–11.8)
POTASSIUM SERPL-SCNC: 4.2 MMOL/L (ref 3.5–5.5)
PROT SERPL-MCNC: 7.9 G/DL (ref 6.4–8.2)
PROT UR STRIP-MCNC: NEGATIVE MG/DL
Q-T INTERVAL, ECG07: 358 MS
QRS DURATION, ECG06: 70 MS
QTC CALCULATION (BEZET), ECG08: 394 MS
RBC # BLD AUTO: 5.54 M/UL (ref 4.2–5.3)
RBC #/AREA URNS HPF: NEGATIVE /HPF (ref 0–5)
SALICYLATES SERPL-MCNC: <1.7 MG/DL (ref 2.8–20)
SODIUM SERPL-SCNC: 139 MMOL/L (ref 136–145)
SP GR UR REFRACTOMETRY: 1.01 (ref 1–1.03)
TROPONIN-HIGH SENSITIVITY: 4 NG/L (ref 0–54)
UROBILINOGEN UR QL STRIP.AUTO: 0.2 EU/DL (ref 0.2–1)
VENTRICULAR RATE, ECG03: 73 BPM
WBC # BLD AUTO: 7.7 K/UL (ref 4.6–13.2)
WBC URNS QL MICRO: ABNORMAL /HPF (ref 0–5)

## 2022-02-18 PROCEDURE — 81025 URINE PREGNANCY TEST: CPT

## 2022-02-18 PROCEDURE — 83690 ASSAY OF LIPASE: CPT

## 2022-02-18 PROCEDURE — 80053 COMPREHEN METABOLIC PANEL: CPT

## 2022-02-18 PROCEDURE — 74011000636 HC RX REV CODE- 636: Performed by: EMERGENCY MEDICINE

## 2022-02-18 PROCEDURE — 84484 ASSAY OF TROPONIN QUANT: CPT

## 2022-02-18 PROCEDURE — 83880 ASSAY OF NATRIURETIC PEPTIDE: CPT

## 2022-02-18 PROCEDURE — 83605 ASSAY OF LACTIC ACID: CPT

## 2022-02-18 PROCEDURE — 99284 EMERGENCY DEPT VISIT MOD MDM: CPT

## 2022-02-18 PROCEDURE — 93005 ELECTROCARDIOGRAM TRACING: CPT

## 2022-02-18 PROCEDURE — 82077 ASSAY SPEC XCP UR&BREATH IA: CPT

## 2022-02-18 PROCEDURE — 85025 COMPLETE CBC W/AUTO DIFF WBC: CPT

## 2022-02-18 PROCEDURE — 70450 CT HEAD/BRAIN W/O DYE: CPT

## 2022-02-18 PROCEDURE — 71045 X-RAY EXAM CHEST 1 VIEW: CPT

## 2022-02-18 PROCEDURE — 74177 CT ABD & PELVIS W/CONTRAST: CPT

## 2022-02-18 PROCEDURE — 80179 DRUG ASSAY SALICYLATE: CPT

## 2022-02-18 PROCEDURE — 81001 URINALYSIS AUTO W/SCOPE: CPT

## 2022-02-18 PROCEDURE — 80143 DRUG ASSAY ACETAMINOPHEN: CPT

## 2022-02-18 PROCEDURE — 83735 ASSAY OF MAGNESIUM: CPT

## 2022-02-18 PROCEDURE — 74011250636 HC RX REV CODE- 250/636: Performed by: EMERGENCY MEDICINE

## 2022-02-18 RX ADMIN — SODIUM CHLORIDE 1000 ML: 9 INJECTION, SOLUTION INTRAVENOUS at 10:47

## 2022-02-18 RX ADMIN — IOPAMIDOL 100 ML: 612 INJECTION, SOLUTION INTRAVENOUS at 11:54

## 2022-02-18 NOTE — ED PROVIDER NOTES
45-year-old female presents emergency department with complaint of not being able to eat for the last month. She arrived to the emergency department in no acute distress speaking complete sentences no fever no hypoxia no emergent hypotension. Patient was seen 2 days ago in this facility for similar symptoms had a completely negative investigation at that time and lab work was told to follow-up with her primary care physician and her GI doctor. Patient has not followed up with her physicians as she continues to complain of these vague symptoms. She is quite rude to the staff  nursing and myself. She is making outrageous demands for testing with no identifiable emergent condition and refusal to follow-up as an outpatient. Her diagnostics and medical history is complicated by what appears to be significant psychiatric disorder feeding into anxiety and depression worsening her perceived symptoms. She did present to the emergency department for a second time further investigation into nausea vomiting will be done however this is likely psychosomatic versus cyclic vomiting syndrome without emergent condition.            Past Medical History:   Diagnosis Date    Anxiety     Arrhythmia     Chronic pain     Dysuria     Gastrointestinal disorder     colitis    Gross hematuria     Heart murmur     Hematuria     HPV (human papilloma virus) infection     Hypertension     Hypothyroidism     Ischemic colitis ([facility] [address]     MS (multiple sclerosis) (HCC)     OCD (obsessive compulsive disorder)     Other ill-defined conditions(799.89)     prescription drug addiction    Pain management     Psychiatric disorder     depression, bipolar anxiety, ocd    Rapid heart rate     Seizures (HCC)     Tattoo     Tattoos    Vitamin D deficiency     Vulvar pain        Past Surgical History:   Procedure Laterality Date    HX ORTHOPAEDIC      back surgery, ulnar nerve    HX ORTHOPAEDIC      back surgery x 2    HX OTHER SURGICAL      ulner nerve surgery    HX TUBAL LIGATION           Family History:   Problem Relation Age of Onset    Diabetes Mother     Cancer Maternal Grandmother     Breast Cancer Maternal Aunt     Thyroid Disease Maternal Aunt        Social History     Socioeconomic History    Marital status: SINGLE     Spouse name: Not on file    Number of children: Not on file    Years of education: Not on file    Highest education level: Not on file   Occupational History    Not on file   Tobacco Use    Smoking status: Former Smoker     Packs/day: 1.50     Types: Cigarettes     Quit date: 7/10/2017     Years since quittin.6    Smokeless tobacco: Never Used    Tobacco comment: pt stated she quit 2 weeks ago   Substance and Sexual Activity    Alcohol use: No     Comment: occasionally    Drug use: No    Sexual activity: Not Currently   Other Topics Concern    Dental Braces Not Asked    Endoscopic Camera Pill Not Asked    Metallic Foreign Body Not Asked    Medication Patches Not Asked    Taking Feraheme Not Asked    Claustrophobic Not Asked    Removable Dental Work Not Asked    Hearing Aids Not Asked    Body Piercing Not Asked    Radiation Seeds Not Asked    Pregnant or Breast Feeding Not Asked    Wounded by Shrapnel or Bullet Not Asked    Other-See Comment Not Asked    Other Implant-See Comment Not Asked   Social History Narrative    ** Merged History Encounter **          Social Determinants of Health     Financial Resource Strain:     Difficulty of Paying Living Expenses: Not on file   Food Insecurity:     Worried About Running Out of Food in the Last Year: Not on file    Lisa of Food in the Last Year: Not on file   Transportation Needs:     Lack of Transportation (Medical): Not on file    Lack of Transportation (Non-Medical):  Not on file   Physical Activity:     Days of Exercise per Week: Not on file    Minutes of Exercise per Session: Not on file   Stress:     Feeling of Stress : Not on file   Social Connections:     Frequency of Communication with Friends and Family: Not on file    Frequency of Social Gatherings with Friends and Family: Not on file    Attends Worship Services: Not on file    Active Member of Clubs or Organizations: Not on file    Attends Club or Organization Meetings: Not on file    Marital Status: Not on file   Intimate Partner Violence:     Fear of Current or Ex-Partner: Not on file    Emotionally Abused: Not on file    Physically Abused: Not on file    Sexually Abused: Not on file   Housing Stability:     Unable to Pay for Housing in the Last Year: Not on file    Number of [place] in the Last Year: Not on file    Unstable Housing in the Last Year: Not on file         ALLERGIES: Ciprofloxacin, Sulfatrim ds, Doxycycline, Sulfa (sulfonamide antibiotics), Sulfa (sulfonamide antibiotics), and Penicillins    Review of Systems   Constitutional: Positive for activity change, appetite change and fatigue. Negative for chills, diaphoresis, fever and unexpected weight change. HENT: Negative. Eyes: Negative. Respiratory: Negative. Cardiovascular: Negative. Gastrointestinal: Positive for abdominal pain, nausea and vomiting. Negative for blood in stool, constipation, diarrhea and rectal pain. Endocrine: Negative. Genitourinary: Negative. Musculoskeletal: Positive for arthralgias and myalgias. Negative for back pain, gait problem, joint swelling, neck pain and neck stiffness. Skin: Negative. Allergic/Immunologic: Negative. Neurological: Negative. Hematological: Negative. Psychiatric/Behavioral: Positive for behavioral problems. The patient is nervous/anxious and is hyperactive.         Vitals:    02/18/22 1005 02/18/22 1030 02/18/22 1046 02/18/22 1101   BP:   116/68 108/74   Pulse:       Resp:       Temp:       SpO2:  100%     Weight: 63.5 kg (140 lb)      Height: 5' 4\" (1.626 m)               Physical Exam  Vitals and nursing note reviewed. Constitutional:       General: She is not in acute distress. Appearance: She is normal weight. She is not ill-appearing, toxic-appearing or diaphoretic. HENT:      Head: Normocephalic and atraumatic. Nose: Nose normal. No congestion or rhinorrhea. Mouth/Throat:      Mouth: Mucous membranes are moist.      Pharynx: Oropharynx is clear. No oropharyngeal exudate or posterior oropharyngeal erythema. Eyes:      General: No scleral icterus. Right eye: No discharge. Left eye: No discharge. Extraocular Movements: Extraocular movements intact. Conjunctiva/sclera: Conjunctivae normal.      Pupils: Pupils are equal, round, and reactive to light. Neck:      Vascular: No carotid bruit. Cardiovascular:      Rate and Rhythm: Normal rate and regular rhythm. Pulses: Normal pulses. Heart sounds: Normal heart sounds. No murmur heard. No friction rub. No gallop. Pulmonary:      Effort: Pulmonary effort is normal. No respiratory distress. Breath sounds: Normal breath sounds. No stridor. No wheezing or rhonchi. Abdominal:      General: Abdomen is flat. Bowel sounds are normal. There is no distension. Palpations: Abdomen is soft. There is no mass. Tenderness: There is no abdominal tenderness. Hernia: No hernia is present. Musculoskeletal:         General: Normal range of motion. Cervical back: Normal range of motion and neck supple. No rigidity or tenderness. Lymphadenopathy:      Cervical: No cervical adenopathy. Skin:     General: Skin is warm and dry. Capillary Refill: Capillary refill takes less than 2 seconds. Coloration: Skin is not jaundiced or pale. Findings: No bruising or erythema. Neurological:      General: No focal deficit present. Mental Status: She is alert and oriented to person, place, and time. Mental status is at baseline.    Psychiatric:         Mood and Affect: Mood normal. Behavior: Behavior normal.         Thought Content: Thought content normal.          MDM  Number of Diagnoses or Management Options  Anorexia  Cyclic vomiting syndrome  Non-intractable vomiting with nausea, unspecified vomiting type  Diagnosis management comments: 41-year-old female with chronic vomiting and nausea presents the emergency department second time this week for evaluation of her chronic abdominal pain with nausea and vomiting stating she had not been able to eat the last month. She arrived to the ED in no acute distress afebrile not hypotensive not tachycardic. She had a complete lab evaluation done by myself 2 days ago which showed no acute emergent conditions. She is convinced she has an ongoing infection in her abdomen which is likely psychosomatic related. Exam is unremarkable her lungs are clear bilaterally heart sounds are unremarkable abdomen is soft she complains of tenderness to palpation epigastric area but there is no tenderness in the other quadrants. Labs were drawn today and are unremarkable. No hepatic dysfunction no lipase elevation no anemia no electrolyte abnormalities patient is not pregnant her urine is not infected. Says she had a syncopal episode today EKG was done which showed no arrhythmia, normal sinus rhythm without ST or T wave elevations or depressions. The head was done which was unremarkable. CT of the abdomen was done with IV contrast which showed no acute conditions no emergent pathology nothing to explain her symptoms. The findings of her tests were discussed with the patient at length as obviously she is going through some anxiety over her symptoms however there is no emergent condition necessitating acute intervention there is no admission criteria met for this patient. She was discharged home to follow-up with her primary care physician and her GI doctor.          Procedures

## 2022-03-02 ENCOUNTER — HOSPITAL ENCOUNTER (EMERGENCY)
Age: 47
Discharge: HOME | End: 2022-03-02
Attending: EMERGENCY MEDICINE
Payer: MEDICAID

## 2022-03-02 VITALS
WEIGHT: 130 LBS | RESPIRATION RATE: 18 BRPM | HEART RATE: 77 BPM | SYSTOLIC BLOOD PRESSURE: 107 MMHG | BODY MASS INDEX: 22.2 KG/M2 | DIASTOLIC BLOOD PRESSURE: 63 MMHG | OXYGEN SATURATION: 100 % | HEIGHT: 64 IN | TEMPERATURE: 97.7 F

## 2022-03-02 DIAGNOSIS — M62.838 NECK MUSCLE SPASM: Primary | ICD-10-CM

## 2022-03-02 DIAGNOSIS — R10.84 ABDOMINAL PAIN, GENERALIZED: ICD-10-CM

## 2022-03-02 LAB
ALBUMIN SERPL-MCNC: 4 G/DL (ref 3.4–5)
ALBUMIN/GLOB SERPL: 1.2 {RATIO} (ref 0.8–1.7)
ALP SERPL-CCNC: 80 U/L (ref 45–117)
ALT SERPL-CCNC: 23 U/L (ref 13–56)
ANION GAP SERPL CALC-SCNC: 7 MMOL/L (ref 3–18)
APPEARANCE UR: NORMAL
AST SERPL-CCNC: 11 U/L (ref 10–38)
BASOPHILS # BLD: 0.1 K/UL (ref 0–0.1)
BASOPHILS NFR BLD: 1 % (ref 0–2)
BILIRUB SERPL-MCNC: 1.7 MG/DL (ref 0.2–1)
BILIRUB UR QL: NEGATIVE
BUN SERPL-MCNC: 7 MG/DL (ref 7–18)
BUN/CREAT SERPL: 10 (ref 12–20)
CALCIUM SERPL-MCNC: 9.4 MG/DL (ref 8.5–10.1)
CHLORIDE SERPL-SCNC: 103 MMOL/L (ref 100–111)
CO2 SERPL-SCNC: 30 MMOL/L (ref 21–32)
COLOR UR: YELLOW
CREAT SERPL-MCNC: 0.68 MG/DL (ref 0.6–1.3)
DIFFERENTIAL METHOD BLD: NORMAL
EOSINOPHIL # BLD: 0.2 K/UL (ref 0–0.4)
EOSINOPHIL NFR BLD: 2 % (ref 0–5)
ERYTHROCYTE [DISTWIDTH] IN BLOOD BY AUTOMATED COUNT: 12.6 % (ref 11.6–14.5)
GLOBULIN SER CALC-MCNC: 3.3 G/DL (ref 2–4)
GLUCOSE SERPL-MCNC: 95 MG/DL (ref 74–99)
GLUCOSE UR STRIP.AUTO-MCNC: NEGATIVE MG/DL
HCG UR QL: NEGATIVE
HCT VFR BLD AUTO: 43.7 % (ref 35–45)
HGB BLD-MCNC: 14.6 G/DL (ref 12–16)
HGB UR QL STRIP: NEGATIVE
IMM GRANULOCYTES # BLD AUTO: 0 K/UL (ref 0–0.04)
IMM GRANULOCYTES NFR BLD AUTO: 0 % (ref 0–0.5)
KETONES UR QL STRIP.AUTO: NEGATIVE MG/DL
LEUKOCYTE ESTERASE UR QL STRIP.AUTO: NEGATIVE
LIPASE SERPL-CCNC: 135 U/L (ref 73–393)
LYMPHOCYTES # BLD: 2.5 K/UL (ref 0.9–3.6)
LYMPHOCYTES NFR BLD: 26 % (ref 21–52)
MAGNESIUM SERPL-MCNC: 2.2 MG/DL (ref 1.6–2.6)
MCH RBC QN AUTO: 29.1 PG (ref 24–34)
MCHC RBC AUTO-ENTMCNC: 33.4 G/DL (ref 31–37)
MCV RBC AUTO: 87.2 FL (ref 78–100)
MONOCYTES # BLD: 0.8 K/UL (ref 0.05–1.2)
MONOCYTES NFR BLD: 9 % (ref 3–10)
NEUTS SEG # BLD: 6 K/UL (ref 1.8–8)
NEUTS SEG NFR BLD: 63 % (ref 40–73)
NITRITE UR QL STRIP.AUTO: NEGATIVE
NRBC # BLD: 0 K/UL (ref 0–0.01)
NRBC BLD-RTO: 0 PER 100 WBC
PH UR STRIP: 5 [PH] (ref 5–8)
PLATELET # BLD AUTO: 288 K/UL (ref 135–420)
PMV BLD AUTO: 10.5 FL (ref 9.2–11.8)
POTASSIUM SERPL-SCNC: 3.7 MMOL/L (ref 3.5–5.5)
PROT SERPL-MCNC: 7.3 G/DL (ref 6.4–8.2)
PROT UR STRIP-MCNC: NEGATIVE MG/DL
RBC # BLD AUTO: 5.01 M/UL (ref 4.2–5.3)
SODIUM SERPL-SCNC: 140 MMOL/L (ref 136–145)
SP GR UR REFRACTOMETRY: 1.01 (ref 1–1.03)
UROBILINOGEN UR QL STRIP.AUTO: 0.2 EU/DL (ref 0.2–1)
WBC # BLD AUTO: 9.6 K/UL (ref 4.6–13.2)

## 2022-03-02 PROCEDURE — 81003 URINALYSIS AUTO W/O SCOPE: CPT

## 2022-03-02 PROCEDURE — 85025 COMPLETE CBC W/AUTO DIFF WBC: CPT

## 2022-03-02 PROCEDURE — 80053 COMPREHEN METABOLIC PANEL: CPT

## 2022-03-02 PROCEDURE — 83735 ASSAY OF MAGNESIUM: CPT

## 2022-03-02 PROCEDURE — 81025 URINE PREGNANCY TEST: CPT

## 2022-03-02 PROCEDURE — 83690 ASSAY OF LIPASE: CPT

## 2022-03-02 PROCEDURE — 99283 EMERGENCY DEPT VISIT LOW MDM: CPT

## 2022-03-02 NOTE — ED TRIAGE NOTES
Pt ambulatory in NAD. Pt states she has abd pain x a few weeks with nausea and now radiates to her groin and down both legs . Pt states she has lost 10 lbs in a wesk due to not being able to eat/ hold anything down. .  Pt also has back pain with no injuries. Was seen 18 Feb states she wasn't told or given anything.

## 2022-03-02 NOTE — ED NOTES
I have reviewed discharge instructions with the patient. The patient verbalized understanding and is aware to follow up with her PCP today. Patient is in no acute distress and independently ambulatory with steady gait at discharge.

## 2022-03-02 NOTE — ED PROVIDER NOTES
42-year-old female with multiple medical issues presents to the emergency department with nausea vomiting abdominal pain. Patient states that this has been worked up in the past and her work-up has been negative. Patient is here today because she is concerned about being septic. Patient states that she recently saw her PCP who told her that her work-up was negative after blood work. Has no burning with urination no fevers no chills no cough. She has slight back pain which is sharp nonradiating. It feels like a spasm. Patient denies trauma no other issues expressed.            Past Medical History:   Diagnosis Date    Anxiety     Arrhythmia     Chronic pain     Dysuria     Gastrointestinal disorder     colitis    Gross hematuria     Heart murmur     Hematuria     HPV (human papilloma virus) infection     Hypertension     Hypothyroidism     Ischemic colitis ([facility] [address]     MS (multiple sclerosis) (HCC)     OCD (obsessive compulsive disorder)     Other ill-defined conditions(799.89)     prescription drug addiction    Pain management     Psychiatric disorder     depression, bipolar anxiety, ocd    Rapid heart rate     Seizures (HCC)     Tattoo     Tattoos    Vitamin D deficiency     Vulvar pain        Past Surgical History:   Procedure Laterality Date    HX ORTHOPAEDIC      back surgery, ulnar nerve    HX ORTHOPAEDIC      back surgery x 2    HX OTHER SURGICAL      ulner nerve surgery    HX TUBAL LIGATION           Family History:   Problem Relation Age of Onset    Diabetes Mother     Cancer Maternal Grandmother     Breast Cancer Maternal Aunt     Thyroid Disease Maternal Aunt        Social History     Socioeconomic History    Marital status: SINGLE     Spouse name: Not on file    Number of children: Not on file    Years of education: Not on file    Highest education level: Not on file   Occupational History    Not on file   Tobacco Use    Smoking status: Former Smoker     Packs/day: 1.50     Types: Cigarettes     Quit date: 7/10/2017     Years since quittin.6    Smokeless tobacco: Never Used    Tobacco comment: pt stated she quit 2 weeks ago   Substance and Sexual Activity    Alcohol use: No     Comment: occasionally    Drug use: No    Sexual activity: Not Currently   Other Topics Concern    Dental Braces Not Asked    Endoscopic Camera Pill Not Asked    Metallic Foreign Body Not Asked    Medication Patches Not Asked    Taking Feraheme Not Asked    Claustrophobic Not Asked    Removable Dental Work Not Asked    Hearing Aids Not Asked    Body Piercing Not Asked    Radiation Seeds Not Asked    Pregnant or Breast Feeding Not Asked    Wounded by Shrapnel or Bullet Not Asked    Other-See Comment Not Asked    Other Implant-See Comment Not Asked   Social History Narrative    ** Merged History Encounter **          Social Determinants of Health     Financial Resource Strain:     Difficulty of Paying Living Expenses: Not on file   Food Insecurity:     Worried About Running Out of Food in the Last Year: Not on file    Lisa of Food in the Last Year: Not on file   Transportation Needs:     Lack of Transportation (Medical): Not on file    Lack of Transportation (Non-Medical):  Not on file   Physical Activity:     Days of Exercise per Week: Not on file    Minutes of Exercise per Session: Not on file   Stress:     Feeling of Stress : Not on file   Social Connections:     Frequency of Communication with Friends and Family: Not on file    Frequency of Social Gatherings with Friends and Family: Not on file    Attends Quaker Services: Not on file    Active Member of Clubs or Organizations: Not on file    Attends Club or Organization Meetings: Not on file    Marital Status: Not on file   Intimate Partner Violence:     Fear of Current or Ex-Partner: Not on file    Emotionally Abused: Not on file    Physically Abused: Not on file    Sexually Abused: Not on file   Housing Stability:     Unable to Pay for Housing in the Last Year: Not on file    Number of Places Lived in the Last Year: Not on file    Unstable Housing in the Last Year: Not on file         ALLERGIES: Ciprofloxacin, Sulfatrim ds, Doxycycline, Sulfa (sulfonamide antibiotics), Sulfa (sulfonamide antibiotics), and Penicillins    Review of Systems   Constitutional: Negative. Cardiovascular: Negative. Gastrointestinal: Positive for abdominal pain. Genitourinary: Negative. Neurological: Negative. Hematological: Negative. Psychiatric/Behavioral: Negative. Vitals:    03/02/22 1139 03/02/22 1356   BP: 115/82 107/63   Pulse: 90 77   Resp: 18    Temp: 97.7 °F (36.5 °C)    SpO2: 98% 100%   Weight: 59 kg (130 lb)    Height: 5' 4\" (1.626 m)             Physical Exam  Vitals and nursing note reviewed. Constitutional:       General: She is not in acute distress. Appearance: She is well-developed. She is not ill-appearing, toxic-appearing or diaphoretic. HENT:      Head: Normocephalic and atraumatic. Mouth/Throat:      Pharynx: Oropharynx is clear. No pharyngeal swelling or oropharyngeal exudate. Eyes:      Extraocular Movements: Extraocular movements intact. Pupils: Pupils are equal, round, and reactive to light. Cardiovascular:      Rate and Rhythm: Normal rate and regular rhythm. Heart sounds: Normal heart sounds. No murmur heard. No friction rub. Pulmonary:      Effort: Pulmonary effort is normal.   Abdominal:      Palpations: Abdomen is soft. There is no shifting dullness, fluid wave, hepatomegaly, splenomegaly, mass or pulsatile mass. Tenderness: There is no abdominal tenderness. Skin:     General: Skin is warm and dry. Capillary Refill: Capillary refill takes less than 2 seconds. Neurological:      General: No focal deficit present. Mental Status: She is alert and oriented to person, place, and time. Cranial Nerves: No cranial nerve deficit. Motor: No weakness. Psychiatric:         Mood and Affect: Mood is not anxious or depressed. MDM  Number of Diagnoses or Management Options  Diagnosis management comments: Patient has a negative work-up. She was very upset. She wants to be admitted. She want to speak to the patient advocate. I told her if everything comes back negative that she will go home.          Procedures

## 2022-03-18 PROBLEM — K52.9 COLITIS: Status: ACTIVE | Noted: 2019-08-10

## 2022-03-18 PROBLEM — R20.0 LEFT FACIAL NUMBNESS: Status: ACTIVE | Noted: 2021-09-22

## 2022-03-18 PROBLEM — R10.84 ABDOMINAL PAIN, GENERALIZED: Status: ACTIVE | Noted: 2019-08-10

## 2022-03-18 PROBLEM — M54.2 NECK PAIN: Status: ACTIVE | Noted: 2019-11-13

## 2022-03-19 PROBLEM — R20.0 NUMBNESS AND TINGLING: Status: ACTIVE | Noted: 2021-09-21

## 2022-03-19 PROBLEM — R47.9 SPEECH ABNORMALITY: Status: ACTIVE | Noted: 2021-09-22

## 2022-03-19 PROBLEM — R26.9 GAIT DISTURBANCE: Status: ACTIVE | Noted: 2021-09-22

## 2022-03-19 PROBLEM — R20.2 NUMBNESS AND TINGLING: Status: ACTIVE | Noted: 2021-09-21

## 2022-03-19 PROBLEM — J32.9 CHRONIC SINUSITIS: Status: ACTIVE | Noted: 2020-03-21

## 2022-03-19 PROBLEM — K62.5 RECTAL BLEEDING: Status: ACTIVE | Noted: 2019-08-10

## 2022-03-20 PROBLEM — H10.31 ACUTE BACTERIAL CONJUNCTIVITIS OF RIGHT EYE: Status: ACTIVE | Noted: 2020-03-21

## 2022-03-20 PROBLEM — M54.12 CERVICAL RADICULOPATHY: Status: ACTIVE | Noted: 2019-11-13

## 2022-04-06 ENCOUNTER — TRANSCRIBE ORDER (OUTPATIENT)
Dept: SCHEDULING | Age: 47
End: 2022-04-06

## 2022-04-06 DIAGNOSIS — G35 MULTIPLE SCLEROSIS (HCC): Primary | ICD-10-CM

## 2022-05-23 ENCOUNTER — HOSPITAL ENCOUNTER (OUTPATIENT)
Dept: MRI IMAGING | Age: 47
Discharge: HOME | End: 2022-05-23
Payer: MEDICAID

## 2022-05-23 VITALS — BODY MASS INDEX: 25.75 KG/M2 | WEIGHT: 150 LBS

## 2022-05-23 DIAGNOSIS — G35 MULTIPLE SCLEROSIS (HCC): ICD-10-CM

## 2022-05-23 PROCEDURE — A9577 INJ MULTIHANCE: HCPCS

## 2022-05-23 PROCEDURE — 74011250636 HC RX REV CODE- 250/636

## 2022-05-23 PROCEDURE — 72156 MRI NECK SPINE W/O & W/DYE: CPT

## 2022-05-23 RX ADMIN — GADOBENATE DIMEGLUMINE 15 ML: 529 INJECTION, SOLUTION INTRAVENOUS at 11:13

## 2023-02-16 ENCOUNTER — HOSPITAL ENCOUNTER (OUTPATIENT)
Facility: HOSPITAL | Age: 48
Discharge: HOME | End: 2023-02-19

## 2023-02-16 LAB — SENTARA SPECIMEN COLLECTION: NORMAL

## 2023-02-16 PROCEDURE — 99001 SPECIMEN HANDLING PT-LAB: CPT

## 2023-10-14 ENCOUNTER — HOSPITAL ENCOUNTER (EMERGENCY)
Facility: HOSPITAL | Age: 48
Discharge: HOME | End: 2023-10-14
Attending: EMERGENCY MEDICINE
Payer: COMMERCIAL

## 2023-10-14 ENCOUNTER — APPOINTMENT (OUTPATIENT)
Facility: HOSPITAL | Age: 48
End: 2023-10-14
Payer: COMMERCIAL

## 2023-10-14 VITALS
HEIGHT: 64 IN | BODY MASS INDEX: 29.02 KG/M2 | HEART RATE: 98 BPM | WEIGHT: 170 LBS | SYSTOLIC BLOOD PRESSURE: 120 MMHG | DIASTOLIC BLOOD PRESSURE: 76 MMHG | RESPIRATION RATE: 18 BRPM | OXYGEN SATURATION: 99 % | TEMPERATURE: 97.3 F

## 2023-10-14 DIAGNOSIS — U07.1 COVID: ICD-10-CM

## 2023-10-14 DIAGNOSIS — J02.9 ACUTE PHARYNGITIS, UNSPECIFIED ETIOLOGY: Primary | ICD-10-CM

## 2023-10-14 LAB
ALBUMIN SERPL-MCNC: 3.7 G/DL (ref 3.4–5)
ALBUMIN/GLOB SERPL: 1.2 (ref 0.8–1.7)
ALP SERPL-CCNC: 81 U/L (ref 45–117)
ALT SERPL-CCNC: 27 U/L (ref 13–56)
ANION GAP SERPL CALC-SCNC: 9 MMOL/L (ref 3–18)
AST SERPL-CCNC: 25 U/L (ref 10–38)
BASOPHILS # BLD: 0 K/UL (ref 0–0.1)
BASOPHILS NFR BLD: 0 % (ref 0–2)
BILIRUB SERPL-MCNC: 0.9 MG/DL (ref 0.2–1)
BUN SERPL-MCNC: 10 MG/DL (ref 7–18)
BUN/CREAT SERPL: 9 (ref 12–20)
CALCIUM SERPL-MCNC: 8.8 MG/DL (ref 8.5–10.1)
CHLORIDE SERPL-SCNC: 105 MMOL/L (ref 100–111)
CO2 SERPL-SCNC: 26 MMOL/L (ref 21–32)
CREAT SERPL-MCNC: 1.07 MG/DL (ref 0.6–1.3)
DIFFERENTIAL METHOD BLD: ABNORMAL
EOSINOPHIL # BLD: 0 K/UL (ref 0–0.4)
EOSINOPHIL NFR BLD: 0 % (ref 0–5)
ERYTHROCYTE [DISTWIDTH] IN BLOOD BY AUTOMATED COUNT: 12.2 % (ref 11.6–14.5)
FLUAV RNA SPEC QL NAA+PROBE: NOT DETECTED
FLUBV RNA SPEC QL NAA+PROBE: NOT DETECTED
GLOBULIN SER CALC-MCNC: 3.2 G/DL (ref 2–4)
GLUCOSE SERPL-MCNC: 95 MG/DL (ref 74–99)
HCT VFR BLD AUTO: 41 % (ref 35–45)
HGB BLD-MCNC: 14.2 G/DL (ref 12–16)
IMM GRANULOCYTES # BLD AUTO: 0 K/UL (ref 0–0.04)
IMM GRANULOCYTES NFR BLD AUTO: 0 % (ref 0–0.5)
LYMPHOCYTES # BLD: 1.3 K/UL (ref 0.9–3.6)
LYMPHOCYTES NFR BLD: 18 % (ref 21–52)
MCH RBC QN AUTO: 28.9 PG (ref 24–34)
MCHC RBC AUTO-ENTMCNC: 34.6 G/DL (ref 31–37)
MCV RBC AUTO: 83.3 FL (ref 78–100)
MONOCYTES # BLD: 0.7 K/UL (ref 0.05–1.2)
MONOCYTES NFR BLD: 10 % (ref 3–10)
NEUTS SEG # BLD: 5.2 K/UL (ref 1.8–8)
NEUTS SEG NFR BLD: 72 % (ref 40–73)
NRBC # BLD: 0 K/UL (ref 0–0.01)
NRBC BLD-RTO: 0 PER 100 WBC
PLATELET # BLD AUTO: 162 K/UL (ref 135–420)
PMV BLD AUTO: 11.1 FL (ref 9.2–11.8)
POTASSIUM SERPL-SCNC: 3.7 MMOL/L (ref 3.5–5.5)
PROT SERPL-MCNC: 6.9 G/DL (ref 6.4–8.2)
RBC # BLD AUTO: 4.92 M/UL (ref 4.2–5.3)
S PYO AG THROAT QL: NEGATIVE
SARS-COV-2 RNA RESP QL NAA+PROBE: DETECTED
SODIUM SERPL-SCNC: 140 MMOL/L (ref 136–145)
WBC # BLD AUTO: 7.2 K/UL (ref 4.6–13.2)

## 2023-10-14 PROCEDURE — 96360 HYDRATION IV INFUSION INIT: CPT

## 2023-10-14 PROCEDURE — 85025 COMPLETE CBC W/AUTO DIFF WBC: CPT

## 2023-10-14 PROCEDURE — 99285 EMERGENCY DEPT VISIT HI MDM: CPT

## 2023-10-14 PROCEDURE — 87636 SARSCOV2 & INF A&B AMP PRB: CPT

## 2023-10-14 PROCEDURE — 6360000004 HC RX CONTRAST MEDICATION: Performed by: EMERGENCY MEDICINE

## 2023-10-14 PROCEDURE — 2580000003 HC RX 258: Performed by: EMERGENCY MEDICINE

## 2023-10-14 PROCEDURE — 6370000000 HC RX 637 (ALT 250 FOR IP): Performed by: EMERGENCY MEDICINE

## 2023-10-14 PROCEDURE — 87880 STREP A ASSAY W/OPTIC: CPT

## 2023-10-14 PROCEDURE — 80053 COMPREHEN METABOLIC PANEL: CPT

## 2023-10-14 PROCEDURE — 96361 HYDRATE IV INFUSION ADD-ON: CPT

## 2023-10-14 PROCEDURE — 70491 CT SOFT TISSUE NECK W/DYE: CPT

## 2023-10-14 PROCEDURE — 87070 CULTURE OTHR SPECIMN AEROBIC: CPT

## 2023-10-14 RX ORDER — SODIUM CHLORIDE, SODIUM LACTATE, POTASSIUM CHLORIDE, AND CALCIUM CHLORIDE .6; .31; .03; .02 G/100ML; G/100ML; G/100ML; G/100ML
2000 INJECTION, SOLUTION INTRAVENOUS ONCE
Status: COMPLETED | OUTPATIENT
Start: 2023-10-14 | End: 2023-10-14

## 2023-10-14 RX ADMIN — SODIUM CHLORIDE, POTASSIUM CHLORIDE, SODIUM LACTATE AND CALCIUM CHLORIDE 2000 ML: 600; 310; 30; 20 INJECTION, SOLUTION INTRAVENOUS at 12:37

## 2023-10-14 RX ADMIN — BENZOCAINE AND MENTHOL 1 LOZENGE: 15; 3.6 LOZENGE ORAL at 13:09

## 2023-10-14 RX ADMIN — IOPAMIDOL 100 ML: 612 INJECTION, SOLUTION INTRAVENOUS at 15:50

## 2023-10-14 NOTE — ED NOTES
Medication given per [facility] order. Education regarding medication provided. Tolerated well with no complaints. Instructed patient to utilize the call light if he/she needs anything further. Will continue to monitor.        Juan Win RN  10/14/23 [id number]

## 2023-10-14 NOTE — ED NOTES
Paperwork read with patient making note of where to  prescriptions (if applicable). IV taken out (if applicable). Armband removed and disposed of in shredder. Patient has no further questions at this time. Will discharge from system.         Juan Win RN  10/14/23 1926

## 2023-10-14 NOTE — ED PROVIDER NOTES
homophones, and other interpretive errors are inadvertently transcribed by the computer software. Please disregard these errors. Please excuse any errors that have escaped final proofreading.     Suman Padilla MD  (Electronically signed)            Ruthie Chatterjee MD  10/14/23 9816

## 2023-10-14 NOTE — ED TRIAGE NOTES
Patient ambulatory to ED c/o dysphagia after eating or drinking x 3 days. States that she endorsed body aches prior to dysphagia. Able to speak in full sentences, NAD.

## 2023-10-15 LAB
BACTERIA SPEC CULT: NORMAL
SERVICE CMNT-IMP: NORMAL

## 2023-10-16 LAB
BACTERIA SPEC CULT: NORMAL
SERVICE CMNT-IMP: NORMAL